# Patient Record
Sex: MALE | Race: WHITE | NOT HISPANIC OR LATINO | Employment: FULL TIME | URBAN - NONMETROPOLITAN AREA
[De-identification: names, ages, dates, MRNs, and addresses within clinical notes are randomized per-mention and may not be internally consistent; named-entity substitution may affect disease eponyms.]

---

## 2017-01-01 ENCOUNTER — HOSPITAL ENCOUNTER (OUTPATIENT)
Dept: OTHER | Facility: HOSPITAL | Age: 57
Setting detail: RECURRING SERIES
Discharge: HOME | End: 2017-01-20
Attending: THORACIC SURGERY (CARDIOTHORACIC VASCULAR SURGERY) | Admitting: THORACIC SURGERY (CARDIOTHORACIC VASCULAR SURGERY)

## 2017-01-07 ENCOUNTER — TRANSCRIBE ORDERS (OUTPATIENT)
Dept: CARDIAC REHAB | Facility: HOSPITAL | Age: 57
End: 2017-01-07

## 2017-01-07 DIAGNOSIS — Z95.1 S/P CABG (CORONARY ARTERY BYPASS GRAFT): Primary | ICD-10-CM

## 2017-01-10 DIAGNOSIS — I25.10 CORONARY ARTERY DISEASE INVOLVING NATIVE CORONARY ARTERY WITHOUT ANGINA PECTORIS, UNSPECIFIED WHETHER NATIVE OR TRANSPLANTED HEART: ICD-10-CM

## 2017-01-10 DIAGNOSIS — Z09 FOLLOW UP: Primary | ICD-10-CM

## 2017-01-11 ENCOUNTER — OFFICE VISIT (OUTPATIENT)
Dept: CARDIAC SURGERY | Facility: CLINIC | Age: 57
End: 2017-01-11

## 2017-01-11 ENCOUNTER — HOSPITAL ENCOUNTER (OUTPATIENT)
Dept: OTHER | Facility: HOSPITAL | Age: 57
Discharge: HOME | End: 2017-01-11
Attending: THORACIC SURGERY (CARDIOTHORACIC VASCULAR SURGERY) | Admitting: THORACIC SURGERY (CARDIOTHORACIC VASCULAR SURGERY)

## 2017-01-11 VITALS
WEIGHT: 206.1 LBS | DIASTOLIC BLOOD PRESSURE: 71 MMHG | BODY MASS INDEX: 31.24 KG/M2 | OXYGEN SATURATION: 98 % | SYSTOLIC BLOOD PRESSURE: 116 MMHG | HEIGHT: 68 IN | HEART RATE: 97 BPM

## 2017-01-11 DIAGNOSIS — Z09 FOLLOW-UP SURGERY CARE: Primary | ICD-10-CM

## 2017-01-11 DIAGNOSIS — J18.9 PNEUMONIA OF LEFT LOWER LOBE DUE TO INFECTIOUS ORGANISM: ICD-10-CM

## 2017-01-11 DIAGNOSIS — R06.09 DYSPNEA ON EXERTION: ICD-10-CM

## 2017-01-11 PROCEDURE — 99024 POSTOP FOLLOW-UP VISIT: CPT | Performed by: NURSE PRACTITIONER

## 2017-01-11 RX ORDER — LANOLIN ALCOHOL/MO/W.PET/CERES
325 CREAM (GRAM) TOPICAL 2 TIMES DAILY
Qty: 60 TABLET | Refills: 0 | Status: SHIPPED | OUTPATIENT
Start: 2017-01-11 | End: 2017-02-10

## 2017-01-11 RX ORDER — LEVOFLOXACIN 750 MG/1
750 TABLET ORAL DAILY
Qty: 5 TABLET | Refills: 0 | Status: SHIPPED | OUTPATIENT
Start: 2017-01-11 | End: 2017-01-16

## 2017-01-11 NOTE — MR AVS SNAPSHOT
Paddy Brantley   1/11/2017 10:00 AM   Office Visit    Dept Phone:  [phone]   Encounter #:  [id number]    Provider:  GISSELLE George   Department:  [facility] CARDIOTHORACIC AND VASCULAR SURGERY                Your Full Care Plan              Today's Medication Changes          These changes are accurate as of: 1/11/17 11:05 AM.  If you have any questions, ask your nurse or doctor.               New Medication(s)Ordered:     ferrous sulfate 325 (65 FE) MG EC tablet   Take 1 tablet by mouth 2 (Two) Times a Day for 30 days.   Started by:  GISSELLE George       levoFLOXacin 750 MG tablet   Commonly known as:  LEVAQUIN   Take 1 tablet by mouth Daily for 5 days.   Started by:  GISSELLE George         Stop taking medication(s)listed here:     clopidogrel 75 MG tablet   Commonly known as:  PLAVIX   Stopped by:  GISSELLE George           metoprolol tartrate 25 MG tablet   Commonly known as:  LOPRESSOR   Stopped by:  GISSELLE George                Where to Get Your Medications      These medications were sent to [facility] [address] [place] [address] AT [facility] [address] [place] - [phone] [facility] [phone] [address] [address]     Phone:  [phone]     aspirin 81 MG tablet    ferrous sulfate 325 (65 FE) MG EC tablet    levoFLOXacin 750 MG tablet                  Your Updated Medication List          This list is accurate as of: 1/11/17 11:05 AM.  Always use your most recent med list.                ascorbic acid 500 MG tablet   Commonly known as:  VITAMIN C       aspirin 81 MG tablet   Take 1 tablet by mouth Daily.       CARDIZEM  MG 24 hr tablet   Generic drug:  diltiazem LA       ferrous sulfate 325 (65 FE) MG EC tablet   Take 1 tablet by mouth 2 (Two) Times a Day for 30 days.       HUMULIN N 100 UNIT/ML injection   Generic drug:  insulin NPH       HYDROcodone-acetaminophen  MG per tablet   Commonly known as:  NORCO   Take 1 tablet by mouth Every 4 (Four) Hours As Needed for moderate pain (4-6).       levoFLOXacin 750 MG tablet   Commonly known as:  LEVAQUIN   Take 1 tablet by mouth Daily for 5 days.       magnesium oxide 400 (241.3 MG) MG tablet tablet   Commonly known as:  MAGOX       raNITIdine 300 MG tablet   Commonly known as:  ZANTAC       senna-docusate 8.6-50 MG per tablet   Commonly known as:  PERICOLACE               We Performed the Following     CBC (No Diff)       You Were Diagnosed With        Codes Comments    Follow-up surgery care    -  Primary ICD-10-CM: Z09  ICD-9-CM: V67.00     Dyspnea on exertion     ICD-10-CM: R06.09  ICD-9-CM: 786.09     Pneumonia of left lower lobe due to infectious organism     ICD-10-CM: J18.9  ICD-9-CM: 483.8       Instructions    If symptoms not better in 3 days, notify Provider for addition of steroids.     Keep f/u with Stefanie next week with CXR.     CXR today improved aeration of lung.     WBC elevated 13     Patient Instructions History      Upcoming Appointments     Visit Type Date Time Department    OFFICE VISIT 1/11/2017 10:00 AM MG[facility] CT VAS SURGERY [facility] FOLLOW UP 1/12/2017  3:45 PM [facility] HEART CARE [facility]    FOLLOW UP 1/18/2017  1:20 PM MGW CT VAS SURGERY [facility]    PHASE II 1/23/2017  8:00 AM [facility] CARDIAC REHAB    PHASE II 1/25/2017  8:00 AM [facility] CARDIAC REHAB    PHASE II 1/27/2017  8:00 AM [facility] CARDIAC REHAB    PHASE II 1/30/2017  8:00 AM [facility] CARDIAC REHAB    PHASE II 2/1/2017  8:00 AM [facility] CARDIAC REHAB    PHASE II 2/3/2017  8:00 AM [facility] CARDIAC REHAB    PHASE II 2/6/2017  8:00 AM [facility] CARDIAC REHAB    PHASE II 2/8/2017  8:00 AM [facility] CARDIAC REHAB    PHASE II 2/10/2017  8:00 AM [facility] CARDIAC REHAB    PHASE II 2/13/2017  8:00 AM [facility] CARDIAC REHAB    PHASE II 2/15/2017  8:00 AM [facility] CARDIAC REHAB    PHASE II 2/17/2017  8:00 AM [facility] CARDIAC REHAB    PHASE II 2/20/2017  8:00 AM  [facility]    PHASE II 2017  8:00 AM [facility]    PHASE II 2017  8:00 AM [facility]    PHASE II 2017  8:00 AM [facility]    PHASE II 3/1/2017  8:00 AM [facility]    PHASE II 3/3/2017  8:00 AM [facility]    PHASE II 3/6/2017  8:00 AM [facility]    PHASE II 3/8/2017  8:00 AM [facility]    PHASE II 3/10/2017  8:00 AM [facility]    PHASE II 3/13/2017  8:00 AM [facility]    PHASE II 3/15/2017  8:00 AM [facility]    PHASE II 3/17/2017  8:00 AM [facility]    PHASE II 3/20/2017  8:00 AM [facility]    PHASE II 3/22/2017  8:00 AM [facility]    PHASE II 3/24/2017  8:00 AM [facility]    PHASE II 3/27/2017  8:00 AM [facility]    PHASE II 3/29/2017  8:00 AM [facility]    PHASE II 3/31/2017  8:00 AM [facility]    PHASE II 4/3/2017  8:00 AM [facility]    PHASE II 2017  8:00 AM [facility]    PHASE II 2017  8:00 AM [facility]    PHASE II 4/10/2017  8:00 AM [facility]    PHASE II 2017  8:00 AM [facility]    PHASE II 2017  8:00 AM [facility]      [facility] [employer] Signup     [facility] allows you to send messages to your doctor, view your test results, renew your prescriptions, schedule appointments, and more. To sign up, go to [employer] and click on the Sign Up Now link in the New User? box. Enter your [employer] Activation Code exactly as it appears below along with the last four digits of your Social Security Number and your Date of Birth () to complete the sign-up process. If you do not sign up before the expiration date, you must request a new code.    MyChart Activation Code: [email or web address]  Expires: 2017 11:00 AM    If you have questions, you can email [email or web address] or call [phone] to talk to our MyChart staff.  "Remember, MyChart is NOT to be used for urgent needs. For medical emergencies, dial 911.               Other Info from Your Visit           Your Appointments     Jan 12, 2017  3:45 PM CST   Hospital Follow Up with Gordo Resendez MD   [facility] CARDIOLOGY (--)    [address]  [facility] [address]   [phone]            Jan 18, 2017  1:20 PM CST   Follow Up with GISSELLE Obando   [facility] CARDIOTHORACIC AND VASCULAR SURGERY (--)    [address] [place] [address] [address]  [facility] [address] [place] [address] [phone]           Arrive 15 minutes prior to appointment.            Jan 23, 2017  8:00 AM CST   PHASE II with  MAD CARD REHAB G[facility]   [facility] CARDIAC REHAB ([address] [address] [phone]            Jan 25, 2017  8:00 AM CST   PHASE II with  MAD CARD REHAB [facility] CARDIAC REHAB ([address] [address] [phone]            Jan 27, 2017  8:00 AM CST   PHASE II with  MAD CARD REHAB [facility] CARDIAC REHAB ([address] [address] [phone]              Allergies     Contrast Dye      Dexamethasone      Diphenhydramine      Iodine      Livalo [Pitavastatin] Allergy Swelling    Penicillins      Plavix [Clopidogrel Bisulfate]      Proton Pump Inhibitors      Shellfish-derived Products      Tetracyclines & Related        Reason for Visit     Coronary Artery Disease post CABG      Vital Signs     Blood Pressure Pulse Height Weight Oxygen Saturation Body Mass Index    116/71 (BP Location: Left arm) 97 68\" (172.7 cm) 206 lb 1.6 oz (93.5 kg) 98% 31.34 kg/m2    Smoking Status                   Former Smoker           Problems and Diagnoses Noted     Breathlessness on exertion    Encounter for examination following surgery    Left lower lobe " pneumonia

## 2017-01-13 NOTE — PROGRESS NOTES
Subjective   Patient ID: Paddy Brantley is a 56 y.o. male is here today for follow-up SOA,WEAKNESS,CABG F/U.    History of Present Illness  The following portions of the patient's history were reviewed and updated as appropriate: allergies, current medications, past family history, past medical history, past social history, past surgical history and problem list.  PCP: Rian  Card: Dank  Surgeon: Alina    56yr old male known CAD with PCI 2012, DM, DLP, tobacco use. Exertional CP with urgent cardiac cath demonstrating recurrent disease. He underwent CABG with unremarkable recovery and dc home on POD 3. He returned to hospital with SOA, Lg Left Hemothorax was drained with CT placement approx 4L total. Remained stable, mild anemia. Plavix was stopped. Request to be seen today for increasing SOA and generalized malaise.     11/2016: Carotid duplex: BICA <50%  11/2016: Echo: EF 55%  11/22/16: CABG x4 LIMA-LAD, SVG-OM, SVG-DX, SVG-PDA, EVH  1/3/17: CXR: LARGE LEFT HEMOTHORAX, CT Guided thoracentesis 500cc  1/4/16: CT placed 2.5L sanginous fluid drained  1/8/17: Cytology Negative Fluid  1/9/17: CXR: Sm Left effusion. DC'd home  1/11/17: CXR: Improved effusion.     Current Outpatient Prescriptions:   •  ascorbic acid (VITAMIN C) 500 MG tablet, Take 500 mg by mouth Daily., Disp: , Rfl:   •  aspirin 81 MG tablet, Take 1 tablet by mouth Daily., Disp: 30 tablet, Rfl: 11  •  diltiazem LA (CARDIZEM LA) 180 MG 24 hr tablet, Take 180 mg by mouth Every Night., Disp: , Rfl:   •  HYDROcodone-acetaminophen (NORCO)  MG per tablet, Take 1 tablet by mouth Every 4 (Four) Hours As Needed for moderate pain (4-6)., Disp: 40 tablet, Rfl: 0  •  insulin NPH (HUMULIN N) 100 UNIT/ML injection, Inject 42 Units under the skin 2 (Two) Times a Day., Disp: , Rfl:   •  magnesium oxide (MAGOX) 400 (241.3 MG) MG tablet tablet, Take 400 mg by mouth 2 (Two) Times a Day., Disp: , Rfl:   •  raNITIdine (ZANTAC) 300 MG tablet, Take 300 mg by mouth 2  (Two) Times a Day., Disp: , Rfl:   •  senna-docusate (PERICOLACE) 8.6-50 MG per tablet, Take 1 tablet by mouth 2 (Two) Times a Day., Disp: , Rfl:   •  ferrous sulfate 325 (65 FE) MG EC tablet, Take 1 tablet by mouth 2 (Two) Times a Day for 30 days., Disp: 60 tablet, Rfl: 0  •  levoFLOXacin (LEVAQUIN) 750 MG tablet, Take 1 tablet by mouth Daily for 5 days., Disp: 5 tablet, Rfl: 0    Review of Systems   Constitution: Positive for fever and weakness.   Cardiovascular: Positive for dyspnea on exertion. Negative for leg swelling.   Respiratory: Positive for shortness of breath. Negative for cough, sputum production and wheezing.    Skin: Negative for poor wound healing.   Musculoskeletal: Negative for falls.   Gastrointestinal: Negative for abdominal pain and constipation.   Genitourinary: Negative for dysuria.   Neurological: Negative for light-headedness.   All other systems reviewed and are negative.       Objective   Physical Exam   Constitutional: He is oriented to person, place, and time. He appears well-developed.   HENT:   Head: Normocephalic.   Cardiovascular: Normal rate.    Pulmonary/Chest: Accessory muscle usage present. He has decreased breath sounds in the left lower field.   Abdominal: Soft. Bowel sounds are normal. He exhibits no distension.   Musculoskeletal: Normal range of motion. He exhibits no edema.   Neurological: He is alert and oriented to person, place, and time.   Skin: Skin is warm and dry. No erythema.   CT sites clear   Vitals reviewed.    Hospital Outpatient Visit on 01/11/2017   Component Date Value Ref Range Status   • WBC 01/11/2017 13.1* 3.2 - 9.8 x1000/uL Final   • RBC 01/11/2017 3.92* 4.37 - 5.74 ki/mm3 Final   • Hemoglobin 01/11/2017 11.0* 13.7 - 17.3 gm/dl Final   • Hematocrit 01/11/2017 34.0* 39.0 - 49.0 % Final   • MCV 01/11/2017 86.7  80.0 - 98.0 fl Final   • MCH 01/11/2017 28.1  26.0 - 34.0 pg Final   • MCHC 01/11/2017 32.4  31.5 - 36.3 gm/dl Final   • RDW 01/11/2017 13.9   11.5 - 14.5 % Final   • Platelets 01/11/2017 362  150 - 450 x1000/mm3 Final   • MPV 01/11/2017 8.7  8.0 - 12.0 fl Final   • Neutrophil Rel % 01/11/2017 83.0* 37.0 - 80.0 % Final   • Lymphocyte Rel % 01/11/2017 8.0* 10.0 - 50.0 % Final   • Monocyte Rel % 01/11/2017 7.2  0.0 - 12.0 % Final   • Eosinophil Rel % 01/11/2017 0.8  0.0 - 7.0 % Final   • Basophil Rel % 01/11/2017 0.2  0.0 - 2.0 % Final   • Immature Granulocyte Rel % 01/11/2017 0.80* 0.00 - 0.50 % Final   • Neutrophils Absolute 01/11/2017 10.87* 2.00 - 8.60 x1000/uL Final   • Lymphocytes Absolute 01/11/2017 1.05  0.60 - 4.20 x1000/uL Final   • Monocytes Absolute 01/11/2017 0.94* 0.00 - 0.90 x1000/uL Final   • Eosinophils Absolute 01/11/2017 0.11  0.00 - 0.70 x1000/uL Final   • Basophils Absolute 01/11/2017 0.02  0.00 - 0.20 x1000/uL Final   • Immature Granulocytes Absolute 01/11/2017 0.100* 0.005 - 0.022 x1000/uL Final       Assessment/Plan   Independent Review of Radiographic Studies:    Detailed discussion regarding risks, benefits, and treatment plan.  Patient understands, agrees, and wishes to proceed with plan.  Progressing slowly.    1. Follow-up surgery care  Overall generalized weakness. Mild fever. Sites clear. H/H improved.   - CBC (No Diff)  Findings discussed with Dr. Fuller, treat accordingly.   Return next week with CXR - Stefanie PITTS    2. Dyspnea on exertion  CXR improved.     3. Pneumonia of left lower lobe due to infectious organism  Levaquin 750mg daily x 5 doses. CXR non-impressive however symptoms and WBC elevation suspicious for PNA.

## 2017-01-18 ENCOUNTER — HOSPITAL ENCOUNTER (OUTPATIENT)
Dept: OTHER | Facility: HOSPITAL | Age: 57
Discharge: HOME | End: 2017-01-18
Attending: THORACIC SURGERY (CARDIOTHORACIC VASCULAR SURGERY) | Admitting: THORACIC SURGERY (CARDIOTHORACIC VASCULAR SURGERY)

## 2017-01-18 ENCOUNTER — OFFICE VISIT (OUTPATIENT)
Dept: CARDIAC SURGERY | Facility: CLINIC | Age: 57
End: 2017-01-18

## 2017-01-18 VITALS
TEMPERATURE: 96.9 F | BODY MASS INDEX: 31.66 KG/M2 | DIASTOLIC BLOOD PRESSURE: 78 MMHG | OXYGEN SATURATION: 99 % | HEART RATE: 86 BPM | SYSTOLIC BLOOD PRESSURE: 131 MMHG | HEIGHT: 68 IN | WEIGHT: 208.9 LBS

## 2017-01-18 DIAGNOSIS — J90 PLEURAL EFFUSION: Primary | ICD-10-CM

## 2017-01-18 DIAGNOSIS — Z09 FOLLOW-UP SURGERY CARE: ICD-10-CM

## 2017-01-18 DIAGNOSIS — J90 PLEURAL EFFUSION: ICD-10-CM

## 2017-01-18 DIAGNOSIS — I25.110 CORONARY ARTERY DISEASE INVOLVING NATIVE CORONARY ARTERY OF NATIVE HEART WITH UNSTABLE ANGINA PECTORIS (HCC): Primary | ICD-10-CM

## 2017-01-18 PROCEDURE — 99024 POSTOP FOLLOW-UP VISIT: CPT | Performed by: NURSE PRACTITIONER

## 2017-01-18 NOTE — MR AVS SNAPSHOT
Paddy Brantley   1/18/2017 1:20 PM   Office Visit    Dept Phone:  [phone]   Encounter #:  [id number]    Provider:  GISSELLE Obando   Department:  [facility] CARDIOTHORACIC AND VASCULAR SURGERY                Your Full Care Plan              Your Updated Medication List          This list is accurate as of: 1/18/17  2:24 PM.  Always use your most recent med list.                ascorbic acid 500 MG tablet   Commonly known as:  VITAMIN C       aspirin 81 MG tablet   Take 1 tablet by mouth Daily.       CARDIZEM  MG 24 hr tablet   Generic drug:  diltiazem LA       ferrous sulfate 325 (65 FE) MG EC tablet   Take 1 tablet by mouth 2 (Two) Times a Day for 30 days.       HUMULIN N 100 UNIT/ML injection   Generic drug:  insulin NPH       HYDROcodone-acetaminophen  MG per tablet   Commonly known as:  NORCO   Take 1 tablet by mouth Every 4 (Four) Hours As Needed for moderate pain (4-6).       magnesium oxide 400 (241.3 MG) MG tablet tablet   Commonly known as:  MAGOX       raNITIdine 300 MG tablet   Commonly known as:  ZANTAC       senna-docusate 8.6-50 MG per tablet   Commonly known as:  PERICOLACE               You Were Diagnosed With        Codes Comments    Coronary artery disease involving native coronary artery of native heart with unstable angina pectoris    -  Primary ICD-10-CM: I25.110  ICD-9-CM: 414.01, 411.1     Follow-up surgery care     ICD-10-CM: Z09  ICD-9-CM: V67.00     Pleural effusion     ICD-10-CM: J90  ICD-9-CM: 511.9       Instructions    Ful 1 wk with chest xray  Its stable  Accapella every 2 hrs      Patient Instructions History      Upcoming Appointments     Visit Type Date Time Department    FOLLOW UP 1/18/2017  1:20 PM MGW CT VAS SURGERY [facility]    PHASE II 1/23/2017  8:00 AM [facility] CARDIAC REHAB    PHASE II 1/25/2017  8:00 AM [facility] CARDIAC REHAB    PHASE II 1/27/2017  8:00 AM [facility] CARDIAC REHAB    PHASE II 1/30/2017  8:00 AM [facility]  CARDIAC REHAB    PHASE II 2/1/2017  8:00 AM [facility] CARDIAC REHAB    OFFICE VISIT 2/1/2017 10:15 AM [facility] HEART CARE [facility]    PHASE II 2/3/2017  8:00 AM [facility]IAC REH[facility]    PHASE II 2/6/2017  8:00 AM [facility]    PHASE II 2/8/2017  8:00 AM [facility]    PHASE II 2/10/2017  8:00 AM [facility]    PHASE II 2/13/2017  8:00 AM [facility]    PHASE II 2/15/2017  8:00 AM [facility]    PHASE II 2/17/2017  8:00 AM [facility]    PHASE II 2/20/2017  8:00 AM [facility]    PHASE II 2/22/2017  8:00 AM [facility]    PHASE II 2/24/2017  8:00 AM [facility]    PHASE II 2/27/2017  8:00 AM [facility]    PHASE II 3/1/2017  8:00 AM [facility]    PHASE II 3/3/2017  8:00 AM [facility]    PHASE II 3/6/2017  8:00 AM [facility]    PHASE II 3/8/2017  8:00 AM [facility]    PHASE II 3/10/2017  8:00 AM [facility] REH[facility]    PHASE II 3/13/2017  8:00 AM [facility] REH[facility]    PHASE II 3/15/2017  8:00 AM [facility]    PHASE II 3/17/2017  8:00 AM [facility]    PHASE II 3/20/2017  8:00 AM [facility]    PHASE II 3/22/2017  8:00 AM [facility] REH[facility]    PHASE II 3/24/2017  8:00 AM [facility] REH[facility]    PHASE II 3/27/2017  8:00 AM [facility] REH[facility]    PHASE II 3/29/2017  8:00 AM [facility] REH[facility]    PHASE II 3/31/2017  8:00 AM [facility] REH[facility]    PHASE II 4/3/2017  8:00 AM [facility]    PHASE II 4/5/2017  8:00 AM [facility]ARDI[facility] REHAB    PHASE II 4/7/2017  8:00 AM [facility] REH[facility]    PHASE II 4/10/2017  8:00 AM [facility] CARDIAC REHAB    PHASE II 4/12/2017  8:00 AM [facility] CARDIAC REHAB    PHASE II 4/14/2017  8:00 AM [facility] CARDIAC REHAB      [employer] [employer] Signup     [facility] allows you to send messages to your doctor, view your test results, renew your prescriptions, schedule appointments, and more. To sign up, go to [employer] [facility] and click  on the Sign Up Now link in the New User? box. Enter your [employer] Activation Code exactly as it appears below along with the last four digits of your Social Security Number and your Date of Birth () to complete the sign-up process. If you do not sign up before the expiration date, you must request a new code.    [employer] Activation Code: [email or web address]  Expires: 2017 11:00 AM    If you have questions, you can email [email or web address] or call [phone] to talk to our [place] staff. Remember, [employer] is NOT to be used for urgent needs. For medical emergencies, dial 911.               Other Info from Your Visit           Your Appointments     2017  8:00 AM CST   PHASE II with  MAD CARD REHAB [facility] CARDIAC REHAB [address] [address]   [phone]            2017  8:00 AM CST   PHASE II with  MAD CARD REHAB [facility] CARDIAC REHAB ([address] [address]   [phone]            2017  8:00 AM CST   PHASE II with  MAD CARD REHAB [facility] CARDIAC REHAB [address] [address]   [phone]            2017  8:00 AM CST   PHASE II with  MAD CARD REHAB [facility] CARDIAC REHAB [address] [address]   [phone]            2017  8:00 AM CST   PHASE II with  MAD CARD REHAB [facility] CARDIAC REHAB [address] [address]   [phone]              Allergies     Contrast Dye      Dexamethasone      Diphenhydramine      Iodine      Livalo [Pitavastatin] Allergy Swelling    Penicillins      Plavix [Clopidogrel Bisulfate]      Proton Pump Inhibitors      Shellfish-derived Products      Tetracyclines & Related        Reason for  "Visit     F/U Plueral Effusion           Vital Signs     Blood Pressure Pulse Temperature Height Weight Oxygen Saturation    131/78 (BP Location: Left arm) 86 96.9 °F (36.1 °C) 68\" (172.7 cm) 208 lb 14.4 oz (94.8 kg) 99%    Body Mass Index Smoking Status                31.76 kg/m2 Former Smoker          Problems and Diagnoses Noted     Coronary artery disease involving native coronary artery of native heart with unstable angina pectoris    Encounter for examination following surgery    Fluid in pleural cavity            "

## 2017-01-19 NOTE — PROGRESS NOTES
Subjective   Patient ID: Paddy Brantley is a 56 y.o. male is here today for follow-up for LEFT pleural effusion (hemothorax) SP CABG and Plavix therapy.  CC:  VAS 2 chest incision  No COYNE or SOA  Feeling better  Not smoking   History of Present Illness  PCP: Toms  Card: Dank  Surgeon: Alina    Mr Brantley is gentleman  known CAD with PCI 2012, DM, DLP, tobacco use. Exertional CP with urgent cardiac cath demonstrating recurrent disease. He underwent CABG with unremarkable recovery and dc home on POD 3. He returned to hospital with SOA, Lg Left Hemothorax was drained with CT placement approx 4L total. Remained stable, mild anemia. Plavix was stopped. Returns today in schedule FU for evaluation of LEFT pleural effusion.     11/2016: Carotid duplex: BICA <50%  11/2016: Echo: EF 55%  11/22/16: CABG x4 LIMA-LAD, SVG-OM, SVG-DX, SVG-PDA, EVH  1/3/17: CXR: LARGE LEFT HEMOTHORAX, CT Guided thoracentesis 500cc  1/4/16: CT placed 2.5L sanginous fluid drained  1/8/17: Cytology Negative Fluid  1/9/17: CXR: Sm Left effusion. DC'd home  1/11/17: CXR: Improved effusion.   1/18/17   CXR: Improved effusion.  Stable to previous CXR.  Linear atelectasis    The following portions of the patient's history were reviewed and updated as appropriate: allergies, current medications, past family history, past medical history, past social history, past surgical history and problem list.    Current Outpatient Prescriptions:   •  ascorbic acid (VITAMIN C) 500 MG tablet, Take 500 mg by mouth Daily., Disp: , Rfl:   •  aspirin 81 MG tablet, Take 1 tablet by mouth Daily., Disp: 30 tablet, Rfl: 11  •  diltiazem LA (CARDIZEM LA) 180 MG 24 hr tablet, Take 180 mg by mouth Every Night., Disp: , Rfl:   •  ferrous sulfate 325 (65 FE) MG EC tablet, Take 1 tablet by mouth 2 (Two) Times a Day for 30 days., Disp: 60 tablet, Rfl: 0  •  HYDROcodone-acetaminophen (NORCO)  MG per tablet, Take 1 tablet by mouth Every 4 (Four) Hours As Needed for moderate  pain (4-6)., Disp: 40 tablet, Rfl: 0  •  insulin NPH (HUMULIN N) 100 UNIT/ML injection, Inject 42 Units under the skin 2 (Two) Times a Day., Disp: , Rfl:   •  magnesium oxide (MAGOX) 400 (241.3 MG) MG tablet tablet, Take 400 mg by mouth 2 (Two) Times a Day., Disp: , Rfl:   •  raNITIdine (ZANTAC) 300 MG tablet, Take 300 mg by mouth 2 (Two) Times a Day., Disp: , Rfl:   •  senna-docusate (PERICOLACE) 8.6-50 MG per tablet, Take 1 tablet by mouth 2 (Two) Times a Day., Disp: , Rfl:     Review of Systems   Constitution: Negative for chills, decreased appetite, fever, weakness and malaise/fatigue.   HENT: Negative for hoarse voice and nosebleeds.    Eyes: Positive for visual disturbance.   Cardiovascular: Positive for chest pain (VAS sternal discomfort  Desire no further pain pills  No pain pills in 4 days ). Negative for claudication, cyanosis, dyspnea on exertion, leg swelling and near-syncope.   Respiratory: Negative for cough, hemoptysis and shortness of breath.    Hematologic/Lymphatic: Does not bruise/bleed easily.   Skin: Negative for color change and poor wound healing.   Musculoskeletal: Negative for falls, muscle cramps and muscle weakness.   Gastrointestinal: Negative for change in bowel habit, heartburn, hematemesis and melena.   Genitourinary: Negative for hematuria.   Neurological: Negative for difficulty with concentration, disturbances in coordination, dizziness, numbness and paresthesias.   Psychiatric/Behavioral: Negative for altered mental status.        Objective   Physical Exam   Constitutional: He is oriented to person, place, and time. He appears well-nourished.   HENT:   Head: Normocephalic.   Eyes: Conjunctivae are normal. Pupils are equal, round, and reactive to light.   Neck: No JVD present. No tracheal deviation present.   Cardiovascular: Normal rate, regular rhythm and normal heart sounds.    Pulmonary/Chest: Effort normal and breath sounds normal.   Slightly decrease left base     Musculoskeletal: He exhibits no edema or tenderness.   Neurological: He is alert and oriented to person, place, and time.   Skin: Skin is warm and dry. No erythema. No pallor.   Sternal incision clean dry well healed Sternum stable     Nursing note and vitals reviewed.      Assessment/Plan   Independent Review of Radiographic Studies:    1/18/17   CXR: Improved effusion.  Stable to previous CXR.  Linear atelectasis  1. Coronary artery disease involving native coronary artery of native heart with unstable angina pectoris  Paddy Brantley is to continue  Antiplatelet.  Pt is intolerant of beta blockers, allergic to all statins.        2. Follow-up surgery care  Wounds healed  Restart cardiac rehab    3. Pleural effusion  Stable  Recheck 1 wk  Accapella every 2 hrs   - XR Chest 2 View; Future

## 2017-01-23 ENCOUNTER — HOSPITAL ENCOUNTER (OUTPATIENT)
Dept: CARDIAC REHAB | Facility: HOSPITAL | Age: 57
Setting detail: THERAPIES SERIES
Discharge: HOME | End: 2017-01-23

## 2017-01-23 VITALS
BODY MASS INDEX: 31.32 KG/M2 | SYSTOLIC BLOOD PRESSURE: 141 MMHG | HEART RATE: 112 BPM | WEIGHT: 206 LBS | DIASTOLIC BLOOD PRESSURE: 77 MMHG

## 2017-01-23 DIAGNOSIS — Z95.1 STATUS POST CORONARY ARTERY BYPASS GRAFT: Primary | ICD-10-CM

## 2017-01-23 PROCEDURE — 93798 PHYS/QHP OP CAR RHAB W/ECG: CPT

## 2017-01-25 ENCOUNTER — OFFICE VISIT (OUTPATIENT)
Dept: CARDIAC SURGERY | Facility: CLINIC | Age: 57
End: 2017-01-25

## 2017-01-25 ENCOUNTER — HOSPITAL ENCOUNTER (OUTPATIENT)
Dept: GENERAL RADIOLOGY | Facility: HOSPITAL | Age: 57
Discharge: HOME | End: 2017-01-25
Admitting: NURSE PRACTITIONER

## 2017-01-25 ENCOUNTER — HOSPITAL ENCOUNTER (OUTPATIENT)
Dept: GENERAL RADIOLOGY | Facility: HOSPITAL | Age: 57
End: 2017-01-25

## 2017-01-25 VITALS
SYSTOLIC BLOOD PRESSURE: 121 MMHG | WEIGHT: 206 LBS | HEIGHT: 68 IN | DIASTOLIC BLOOD PRESSURE: 68 MMHG | TEMPERATURE: 99.5 F | OXYGEN SATURATION: 98 % | BODY MASS INDEX: 31.22 KG/M2 | HEART RATE: 95 BPM

## 2017-01-25 DIAGNOSIS — I25.110 CORONARY ARTERY DISEASE INVOLVING NATIVE CORONARY ARTERY OF NATIVE HEART WITH UNSTABLE ANGINA PECTORIS (HCC): ICD-10-CM

## 2017-01-25 DIAGNOSIS — Z09 FOLLOW-UP SURGERY CARE: Primary | ICD-10-CM

## 2017-01-25 DIAGNOSIS — R06.09 DYSPNEA ON EXERTION: ICD-10-CM

## 2017-01-25 DIAGNOSIS — J90 PLEURAL EFFUSION: ICD-10-CM

## 2017-01-25 PROCEDURE — 71020 HC CHEST PA AND LATERAL: CPT

## 2017-01-25 PROCEDURE — 99024 POSTOP FOLLOW-UP VISIT: CPT | Performed by: NURSE PRACTITIONER

## 2017-01-25 RX ORDER — HYDROCHLOROTHIAZIDE 12.5 MG/1
25 TABLET ORAL DAILY
COMMUNITY
End: 2017-08-10

## 2017-01-25 RX ORDER — TRAMADOL HYDROCHLORIDE 50 MG/1
50 TABLET ORAL EVERY 6 HOURS PRN
Qty: 50 TABLET | Refills: 0 | Status: SHIPPED | OUTPATIENT
Start: 2017-01-25 | End: 2017-08-10

## 2017-01-25 NOTE — MR AVS SNAPSHOT
Paddy Brantley   1/25/2017 9:40 AM   Office Visit    Dept Phone:  [phone]   Encounter #:  [id number]    Provider:  GISSELLE George   Department:  [facility] CARDIOTHORACIC AND VASCULAR SURGERY                Your Full Care Plan              Today's Medication Changes          These changes are accurate as of: 1/25/17 11:29 AM.  If you have any questions, ask your nurse or doctor.               New Medication(s)Ordered:     traMADol 50 MG tablet   Commonly known as:  ULTRAM   Take 1 tablet by mouth Every 6 (Six) Hours As Needed for moderate pain (4-6).   Started by:  GISSELLE George            Where to Get Your Medications      You can get these medications from any pharmacy     Bring a paper prescription for each of these medications     traMADol 50 MG tablet                  Your Updated Medication List          This list is accurate as of: 1/25/17 11:29 AM.  Always use your most recent med list.                ascorbic acid 500 MG tablet   Commonly known as:  VITAMIN C       aspirin 81 MG tablet   Take 1 tablet by mouth Daily.       CARDIZEM  MG 24 hr tablet   Generic drug:  diltiazem LA       ferrous sulfate 325 (65 FE) MG EC tablet   Take 1 tablet by mouth 2 (Two) Times a Day for 30 days.       HUMULIN N 100 UNIT/ML injection   Generic drug:  insulin NPH       HYDROcodone-acetaminophen  MG per tablet   Commonly known as:  NORCO   Take 1 tablet by mouth Every 4 (Four) Hours As Needed for moderate pain (4-6).       magnesium oxide 400 (241.3 MG) MG tablet tablet   Commonly known as:  MAGOX       raNITIdine 300 MG tablet   Commonly known as:  ZANTAC       senna-docusate 8.6-50 MG per tablet   Commonly known as:  PERICOLACE       traMADol 50 MG tablet   Commonly known as:  ULTRAM   Take 1 tablet by mouth Every 6 (Six) Hours As Needed for moderate pain (4-6).               You Were Diagnosed With        Codes Comments    Follow-up  surgery care    -  Primary ICD-10-CM: Z09  ICD-9-CM: V67.00     Dyspnea on exertion     ICD-10-CM: R06.09  ICD-9-CM: 786.09     Coronary artery disease involving native coronary artery of native heart with unstable angina pectoris     ICD-10-CM: I25.110  ICD-9-CM: 414.01, 411.1       Instructions    Return 4 weeks with CXR    Increase HCTZ 25mg daily     Patient Instructions History      Upcoming Appointments     Visit Type Date Time Department    PHASE II 1/25/2017  8:00 AM  [facility] CARDIAC REHAB    OFFICE VISIT 1/25/2017  9:40 AM [facility] CT VAS SURGERY [facility]AD    XR MAD CHEST 2 VW 1/25/2017 10:15 AM  MAD XRAY    PHASE II 1/27/2017  8:00 AM  [facility] CARDIAC REHAB    PHASE II 1/30/2017  8:00 AM  [facility] CARDIAC REHAB    PHASE II 2/1/2017  8:00 AM  [facility] CARDIAC REHAB    OFFICE VISIT 2/1/2017 10:15 AM [facility] HEART CARE [facility]    PHASE II 2/3/2017  8:00 AM  [facility] CARDIAC REHAB    PHASE II 2/6/2017  8:00 AM  [facility] CARDIAC REHAB    PHASE II 2/8/2017  8:00 AM  [facility] CARDIAC REHAB    PHASE II 2/10/2017  8:00 AM  [facility] CARDIAC REHAB    PHASE II 2/13/2017  8:00 AM  [facility] CARDIAC REHAB    PHASE II 2/15/2017  8:00 AM  [facility] CARDIAC REHAB    PHASE II 2/17/2017  8:00 AM  [facility] CARDIAC REHAB    PHASE II 2/20/2017  8:00 AM  [facility] CARDIAC REHAB    PHASE II 2/22/2017  8:00 AM  [facility] CARDIAC REHAB    PHASE II 2/24/2017  8:00 AM  [facility] CARDIAC REHAB    PHASE II 2/27/2017  8:00 AM  [facility] CARDIAC REHAB    OFFICE VISIT 2/28/2017  1:00 PM MG[facility] CT VAS SURGERY MAD    PHASE II 3/1/2017  8:00 AM  [facility] CARDIAC REHAB    PHASE II 3/3/2017  8:00 AM  [facility] CARDIAC REHAB    PHASE II 3/6/2017  8:00 AM  [facility] CARDIAC REHAB    PHASE II 3/8/2017  8:00 AM  [facility] CARDIAC REHAB    PHASE II 3/10/2017  8:00 AM  [facility] CARDIAC REHAB    PHASE II 3/13/2017  8:00 AM [facility] CARDIAC REHAB    PHASE II 3/15/2017  8:00 AM [facility] CARD[facility]    PHASE II 3/17/2017  8:00 AM [facility]    PHASE II 3/20/2017  8:00 AM [facility]ARD[facility]    PHASE II 3/22/2017  8:00 AM [facility]  CARDIAC REHAB    PHASE II 3/24/2017  8:00 AM [facility] CARDIAC REHAB    PHASE II 3/27/2017  8:00 AM [facility] CARDIAC REHAB    PHASE II 3/29/2017  8:00 AM BH [facility] CARDIAC REHAB    PHASE II 3/31/2017  8:00 AM [facility] CARDIAC REHAB    PHASE II 4/3/2017  8:00 AM BH [facility] CARDIAC REHAB    PHASE II 2017  8:00 AM BH [facility] CARDIAC REHAB    PHASE II 2017  8:00 AM BH [facility]AD CARDIAC REHAB    PHASE II 4/10/2017  8:00 AM BH [facility] CARDIAC REHAB    PHASE II 2017  8:00 AM BH [facility] CARDIAC REHAB    PHASE II 2017  8:00 AM BH MAD CARDIAC REHAB      iSOCOt Signup     [facility] Alloka allows you to send messages to your doctor, view your test results, renew your prescriptions, schedule appointments, and more. To sign up, go to [employer] and click on the Sign Up Now link in the New User? box. Enter your [employer] Activation Code exactly as it appears below along with the last four digits of your Social Security Number and your Date of Birth () to complete the sign-up process. If you do not sign up before the expiration date, you must request a new code.    Alloka Activation Code: [email or web address]  Expires: 2017 11:28 AM    If you have questions, you can email [email or web address] or call [phone] to talk to our [employer] staff. Remember, [employer]oka is NOT to be used for urgent needs. For medical emergencies, dial 911.               Other Info from Your Visit           Your Appointments     2017  8:00 AM CST   PHASE II with  MAD CARD REHAB GYM   [facility] CARDIAC REHAB [address] [address]   [phone]            2017  8:00 AM CST   PHASE II with  MAD CARD REHAB GYM   [facility] CARDIAC REHAB ([address] [address]   [phone]            2017  8:00 AM CST   PHASE II with  MAD CARD REHAB [facility] CARDIAC REHAB ([place])  "   [address] [facility] [address] [phone]            Feb 01, 2017 10:15 AM CST   Office Visit with Gordo Resendez MD   [facility] CARDIOLOGY (--)    [address]  [facility] [address] [phone]           Arrive 15 minutes prior to appointment.            Feb 03, 2017  8:00 AM CST   PHASE II with BH MAD CARD REHAB GYM   [facility] CARDIAC REHAB ([place])    [address] [place] [address] [phone]              Allergies     Contrast Dye      Dexamethasone      Diphenhydramine      Iodine      Livalo [Pitavastatin] Allergy Swelling    Penicillins      Plavix [Clopidogrel Bisulfate]      Proton Pump Inhibitors      Shellfish-derived Products      Tetracyclines & Related        Reason for Visit     Coronary Artery Disease 1 week follow up       Vital Signs     Blood Pressure Pulse Temperature Height Weight Oxygen Saturation    121/68 (BP Location: Left arm) 95 99.5 °F (37.5 °C) (Oral) 68\" (172.7 cm) 206 lb (93.4 kg) 98%    Body Mass Index Smoking Status                31.32 kg/m2 Former Smoker          Problems and Diagnoses Noted     Coronary artery disease involving native coronary artery of native heart with unstable angina pectoris    Breathlessness on exertion    Encounter for examination following surgery        "

## 2017-01-25 NOTE — LETTER
January 25, 2017     Patient: Paddy Brantley   YOB: 1960   Date of Visit: 1/25/2017       To Whom It May Concern:    It is my medical opinion that Paddy Brantley may return to work 2/6/17 with continued lifting restrictions <20lbs until 2/22/17.           Sincerely,        GSISELLE George    CC: No Recipients

## 2017-01-25 NOTE — PROGRESS NOTES
Subjective   Patient ID: Paddy Brantley is a 56 y.o. male is here today for follow-up CABG, PLEURAL EFFUSION.    History of Present Illness  The following portions of the patient's history were reviewed and updated as appropriate: allergies, current medications, past family history, past medical history, past social history, past surgical history and problem list.  PCP: Rian  Card: Dank  Surgeon: Alina    Mr Brantley is gentleman  known CAD with PCI 2012, DM, DLP, tobacco use. Exertional CP with urgent cardiac cath demonstrating recurrent disease. He underwent CABG with unremarkable recovery and dc home on POD 3. He returned to hospital with SOA, Lg Left Hemothorax was drained with CT placement approx 4L total. Remained stable, mild anemia. Plavix was stopped. Returns today in schedule FU for evaluation of LEFT pleural effusion. Doesn't feel well today.   VAS: 6-Chest Incision     11/2016: Carotid duplex: BICA <50%  11/2016: Echo: EF 55%  11/22/16: CABG x4 LIMA-LAD, SVG-OM, SVG-DX, SVG-PDA, EVH  1/3/17: CXR: LARGE LEFT HEMOTHORAX, CT Guided thoracentesis 500cc  1/4/16: CT placed 2.5L sanginous fluid drained  1/8/17: Cytology Negative Fluid  1/9/17: CXR: Sm Left effusion. DC'd home  1/11/17: CXR: Improved effusion.   1/18/17   CXR: Improved effusion.  Stable to previous CXR.  Linear atelectasis  1/25/17: CXR: Stable Sm-Mod Left effusion.       Current Outpatient Prescriptions:   •  ascorbic acid (VITAMIN C) 500 MG tablet, Take 500 mg by mouth Daily., Disp: , Rfl:   •  aspirin 81 MG tablet, Take 1 tablet by mouth Daily., Disp: 30 tablet, Rfl: 11  •  diltiazem LA (CARDIZEM LA) 180 MG 24 hr tablet, Take 180 mg by mouth Every Night., Disp: , Rfl:   •  ferrous sulfate 325 (65 FE) MG EC tablet, Take 1 tablet by mouth 2 (Two) Times a Day for 30 days., Disp: 60 tablet, Rfl: 0  •  hydrochlorothiazide (HYDRODIURIL) 12.5 MG tablet, Take 25 mg by mouth Daily., Disp: , Rfl:   •  HYDROcodone-acetaminophen (NORCO)  MG per  tablet, Take 1 tablet by mouth Every 4 (Four) Hours As Needed for moderate pain (4-6)., Disp: 40 tablet, Rfl: 0  •  insulin NPH (HUMULIN N) 100 UNIT/ML injection, Inject 42 Units under the skin 2 (Two) Times a Day., Disp: , Rfl:   •  magnesium oxide (MAGOX) 400 (241.3 MG) MG tablet tablet, Take 400 mg by mouth 2 (Two) Times a Day., Disp: , Rfl:   •  raNITIdine (ZANTAC) 300 MG tablet, Take 300 mg by mouth 2 (Two) Times a Day., Disp: , Rfl:   •  senna-docusate (PERICOLACE) 8.6-50 MG per tablet, Take 1 tablet by mouth 2 (Two) Times a Day., Disp: , Rfl:   •  traMADol (ULTRAM) 50 MG tablet, Take 1 tablet by mouth Every 6 (Six) Hours As Needed for moderate pain (4-6)., Disp: 50 tablet, Rfl: 0    Review of Systems   Constitution: Positive for fever (99.5).   Cardiovascular: Positive for chest pain (surgical) and dyspnea on exertion. Negative for palpitations.   Respiratory: Positive for cough (minimal sputum production).    Skin: Negative for poor wound healing.   Genitourinary: Negative for dysuria.   Neurological: Negative for light-headedness.   All other systems reviewed and are negative.       Objective   Physical Exam   Constitutional: He is oriented to person, place, and time. He appears well-developed.   HENT:   Head: Normocephalic.   Cardiovascular: Normal rate.    Pulmonary/Chest: Effort normal. He has decreased breath sounds in the left lower field.   Abdominal: Soft.   Musculoskeletal: Normal range of motion. He exhibits no edema.   Neurological: He is alert and oriented to person, place, and time.   Skin: Skin is warm and dry. No erythema.   Vitals reviewed.      Assessment/Plan   Independent Review of Radiographic Studies:    Detailed discussion regarding risks, benefits, and treatment plan.  Patient understands, agrees, and wishes to proceed with plan.  Progressing slowly.    1. Follow-up surgery care  Progressing with Cardiac Rehab recently started back following hospitalization.  Requests pain medication  refill, original Rx from PCP for chronic back pain. Discussed weaning protocol post surgery. Rx given for Tramadol. Further Nebo will need to be written from PCP.      2. Dyspnea on exertion  Stable Sm-Mod Pleural effusion. Increase HCTZ hesitant to use Lasix  - XR Chest 2 View; Future (4weeks)    3. Coronary artery disease involving native coronary artery of native heart with unstable angina pectoris  Continue Med Mgmt

## 2017-01-27 ENCOUNTER — HOSPITAL ENCOUNTER (OUTPATIENT)
Dept: CARDIAC REHAB | Facility: HOSPITAL | Age: 57
Setting detail: THERAPIES SERIES
Discharge: HOME | End: 2017-01-27

## 2017-01-27 VITALS — HEART RATE: 100 BPM | SYSTOLIC BLOOD PRESSURE: 135 MMHG | DIASTOLIC BLOOD PRESSURE: 71 MMHG

## 2017-01-27 DIAGNOSIS — Z95.1 S/P CABG (CORONARY ARTERY BYPASS GRAFT): Primary | ICD-10-CM

## 2017-01-27 PROCEDURE — 93798 PHYS/QHP OP CAR RHAB W/ECG: CPT

## 2017-01-30 ENCOUNTER — HOSPITAL ENCOUNTER (OUTPATIENT)
Dept: CARDIAC REHAB | Facility: HOSPITAL | Age: 57
Setting detail: THERAPIES SERIES
Discharge: HOME | End: 2017-01-30

## 2017-01-30 VITALS
WEIGHT: 208 LBS | DIASTOLIC BLOOD PRESSURE: 71 MMHG | SYSTOLIC BLOOD PRESSURE: 140 MMHG | BODY MASS INDEX: 31.63 KG/M2 | HEART RATE: 105 BPM

## 2017-01-30 DIAGNOSIS — Z95.1 S/P CABG (CORONARY ARTERY BYPASS GRAFT): Primary | ICD-10-CM

## 2017-01-30 PROCEDURE — 93798 PHYS/QHP OP CAR RHAB W/ECG: CPT

## 2017-02-01 ENCOUNTER — HOSPITAL ENCOUNTER (OUTPATIENT)
Dept: CARDIAC REHAB | Facility: HOSPITAL | Age: 57
Setting detail: THERAPIES SERIES
Discharge: HOME | End: 2017-02-01

## 2017-02-01 VITALS — HEART RATE: 96 BPM | SYSTOLIC BLOOD PRESSURE: 132 MMHG | DIASTOLIC BLOOD PRESSURE: 74 MMHG

## 2017-02-01 DIAGNOSIS — Z95.1 S/P CABG (CORONARY ARTERY BYPASS GRAFT): Primary | ICD-10-CM

## 2017-02-01 PROCEDURE — 93798 PHYS/QHP OP CAR RHAB W/ECG: CPT

## 2017-02-03 ENCOUNTER — HOSPITAL ENCOUNTER (OUTPATIENT)
Dept: CARDIAC REHAB | Facility: HOSPITAL | Age: 57
Setting detail: THERAPIES SERIES
Discharge: HOME | End: 2017-02-03

## 2017-02-03 VITALS — SYSTOLIC BLOOD PRESSURE: 161 MMHG | HEART RATE: 136 BPM | DIASTOLIC BLOOD PRESSURE: 80 MMHG

## 2017-02-03 DIAGNOSIS — Z95.1 S/P CABG (CORONARY ARTERY BYPASS GRAFT): Primary | ICD-10-CM

## 2017-02-03 PROCEDURE — 93798 PHYS/QHP OP CAR RHAB W/ECG: CPT

## 2017-02-08 ENCOUNTER — OFFICE VISIT (OUTPATIENT)
Dept: CARDIOLOGY | Facility: CLINIC | Age: 57
End: 2017-02-08

## 2017-02-08 VITALS
SYSTOLIC BLOOD PRESSURE: 140 MMHG | HEART RATE: 80 BPM | DIASTOLIC BLOOD PRESSURE: 60 MMHG | WEIGHT: 210 LBS | BODY MASS INDEX: 31.93 KG/M2

## 2017-02-08 DIAGNOSIS — E78.5 DYSLIPIDEMIA: Primary | ICD-10-CM

## 2017-02-08 DIAGNOSIS — Z95.1 S/P CABG (CORONARY ARTERY BYPASS GRAFT): ICD-10-CM

## 2017-02-08 DIAGNOSIS — I10 ESSENTIAL HYPERTENSION: ICD-10-CM

## 2017-02-08 DIAGNOSIS — I25.110 CORONARY ARTERY DISEASE INVOLVING NATIVE CORONARY ARTERY OF NATIVE HEART WITH UNSTABLE ANGINA PECTORIS (HCC): ICD-10-CM

## 2017-02-08 PROBLEM — Z72.0 TOBACCO ABUSE: Status: ACTIVE | Noted: 2017-02-08

## 2017-02-08 PROCEDURE — 99213 OFFICE O/P EST LOW 20 MIN: CPT | Performed by: INTERNAL MEDICINE
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
## 2017-02-20 ENCOUNTER — HOSPITAL ENCOUNTER (OUTPATIENT)
Dept: CARDIAC REHAB | Facility: HOSPITAL | Age: 57
Setting detail: THERAPIES SERIES
Discharge: HOME | End: 2017-02-20

## 2017-02-20 VITALS
WEIGHT: 215 LBS | HEART RATE: 87 BPM | DIASTOLIC BLOOD PRESSURE: 68 MMHG | BODY MASS INDEX: 32.69 KG/M2 | SYSTOLIC BLOOD PRESSURE: 135 MMHG

## 2017-02-20 DIAGNOSIS — Z95.1 S/P CABG (CORONARY ARTERY BYPASS GRAFT): Primary | ICD-10-CM

## 2017-02-20 PROCEDURE — 93798 PHYS/QHP OP CAR RHAB W/ECG: CPT

## 2017-02-28 ENCOUNTER — OFFICE VISIT (OUTPATIENT)
Dept: CARDIAC SURGERY | Facility: CLINIC | Age: 57
End: 2017-02-28

## 2017-02-28 ENCOUNTER — HOSPITAL ENCOUNTER (OUTPATIENT)
Dept: GENERAL RADIOLOGY | Facility: HOSPITAL | Age: 57
Discharge: HOME | End: 2017-02-28
Admitting: NURSE PRACTITIONER

## 2017-02-28 VITALS
HEART RATE: 80 BPM | DIASTOLIC BLOOD PRESSURE: 22 MMHG | SYSTOLIC BLOOD PRESSURE: 127 MMHG | WEIGHT: 217.2 LBS | BODY MASS INDEX: 32.92 KG/M2 | HEIGHT: 68 IN | OXYGEN SATURATION: 98 % | TEMPERATURE: 98 F

## 2017-02-28 DIAGNOSIS — I65.23 CAROTID STENOSIS, ASYMPTOMATIC, BILATERAL: ICD-10-CM

## 2017-02-28 DIAGNOSIS — Z09 FOLLOW-UP SURGERY CARE: Primary | ICD-10-CM

## 2017-02-28 DIAGNOSIS — R06.09 DYSPNEA ON EXERTION: ICD-10-CM

## 2017-02-28 DIAGNOSIS — I25.110 CORONARY ARTERY DISEASE INVOLVING NATIVE CORONARY ARTERY OF NATIVE HEART WITH UNSTABLE ANGINA PECTORIS (HCC): ICD-10-CM

## 2017-02-28 PROCEDURE — 71020 HC CHEST PA AND LATERAL: CPT

## 2017-02-28 PROCEDURE — 99213 OFFICE O/P EST LOW 20 MIN: CPT | Performed by: NURSE PRACTITIONER

## 2017-03-01 NOTE — PROGRESS NOTES
Subjective   Patient ID: Paddy Brantley is a 56 y.o. male is here today for follow-up CABG, PLEURAL EFFUSION.    History of Present Illness  The following portions of the patient's history were reviewed and updated as appropriate: allergies, current medications, past family history, past medical history, past social history, past surgical history and problem list.  PCP: Rian  Card: Dank  Surgeon: Alina    Mr Brantley is gentleman  known CAD with PCI 2012, DM, DLP, tobacco use. Exertional CP with urgent cardiac cath demonstrating recurrent disease. He underwent CABG with unremarkable recovery and dc home on POD 3. He returned to hospital with SOA, Lg Left Hemothorax was drained with CT placement approx 4L total. Remained stable, mild anemia. Plavix was stopped. Returns today in schedule FU for evaluation of LEFT pleural effusion. Remains SOA with exertion.    11/2016: PFT: FEV1 31% Severe Restrictive lung disease.  11/2016: Carotid duplex: BICA <50%  11/2016: Echo: EF 55%  11/22/16: CABG x4 LIMA-LAD, SVG-OM, SVG-DX, SVG-PDA, EVH  1/3/17: CXR: LARGE LEFT HEMOTHORAX, CT Guided thoracentesis 500cc  1/4/16: CT placed 2.5L sanginous fluid drained  1/8/17: Cytology Negative Fluid  1/9/17: CXR: Sm Left effusion. DC'd home  1/11/17: CXR: Improved effusion.   1/18/17   CXR: Improved effusion.  Stable to previous CXR.  Linear atelectasis  1/25/17: CXR: Stable Sm-Mod Left effusion.   2/28/17: CXR: Improved Small Left effusion      Current Outpatient Prescriptions:   •  ascorbic acid (VITAMIN C) 500 MG tablet, Take 500 mg by mouth Daily., Disp: , Rfl:   •  aspirin 81 MG tablet, Take 1 tablet by mouth Daily., Disp: 30 tablet, Rfl: 11  •  diltiazem LA (CARDIZEM LA) 180 MG 24 hr tablet, Take 180 mg by mouth Every Night., Disp: , Rfl:   •  hydrochlorothiazide (HYDRODIURIL) 12.5 MG tablet, Take 25 mg by mouth Daily., Disp: , Rfl:   •  HYDROcodone-acetaminophen (NORCO)  MG per tablet, Take 1 tablet by mouth Every 4 (Four)  Hours As Needed for moderate pain (4-6)., Disp: 40 tablet, Rfl: 0  •  insulin NPH (HUMULIN N) 100 UNIT/ML injection, Inject 42 Units under the skin 2 (Two) Times a Day., Disp: , Rfl:   •  insulin regular (humuLIN R) 500 UNIT/ML CONCENTRATED injection, Inject  under the skin. Use as directed, Disp: , Rfl:   •  magnesium oxide (MAGOX) 400 (241.3 MG) MG tablet tablet, Take 400 mg by mouth 2 (Two) Times a Day., Disp: , Rfl:   •  raNITIdine (ZANTAC) 300 MG tablet, Take 300 mg by mouth 2 (Two) Times a Day., Disp: , Rfl:   •  senna-docusate (PERICOLACE) 8.6-50 MG per tablet, Take 1 tablet by mouth 2 (Two) Times a Day., Disp: , Rfl:   •  traMADol (ULTRAM) 50 MG tablet, Take 1 tablet by mouth Every 6 (Six) Hours As Needed for moderate pain (4-6)., Disp: 50 tablet, Rfl: 0    Review of Systems   Constitution: Negative for fever.   Cardiovascular: Positive for chest pain (surgical) and dyspnea on exertion. Negative for palpitations.   Respiratory: Negative for cough.    Skin: Negative for poor wound healing.   Genitourinary: Negative for dysuria.   Neurological: Negative for light-headedness.   All other systems reviewed and are negative.       Objective   Physical Exam   Constitutional: He is oriented to person, place, and time. He appears well-developed.   HENT:   Head: Normocephalic.   Cardiovascular: Normal rate.    Pulmonary/Chest: Effort normal. He has decreased breath sounds in the left lower field.   Abdominal: Soft.   Musculoskeletal: Normal range of motion. He exhibits no edema.   Neurological: He is alert and oriented to person, place, and time.   Skin: Skin is warm and dry. No erythema.   Vitals reviewed.      Assessment/Plan   Independent Review of Radiographic Studies:    Detailed discussion regarding risks, benefits, and treatment plan.  Patient understands, agrees, and wishes to proceed with plan.  Slow improving post op. Has returned to work.    1. Follow-up surgery care  Continue cardiac rehab on off work  days.   CVTS PRN    2. Coronary artery disease involving native coronary artery of native heart with unstable angina pectoris  Medical management  Follow with Dr. Wemes    3. Dyspnea on exertion  Improved CXR. Continue HCTZ, acapella, incentive.   Restrictive disease per PFT, inhaler PRN, pulmonary rehab.    4. Bilateral Carotid Stenosis  Annual surveillance recommended. Patient declined at this time.

## 2017-03-22 ENCOUNTER — APPOINTMENT (OUTPATIENT)
Dept: CARDIAC REHAB | Facility: HOSPITAL | Age: 57
End: 2017-03-22

## 2017-03-24 ENCOUNTER — APPOINTMENT (OUTPATIENT)
Dept: CARDIAC REHAB | Facility: HOSPITAL | Age: 57
End: 2017-03-24

## 2017-03-27 ENCOUNTER — APPOINTMENT (OUTPATIENT)
Dept: CARDIAC REHAB | Facility: HOSPITAL | Age: 57
End: 2017-03-27

## 2017-03-29 ENCOUNTER — APPOINTMENT (OUTPATIENT)
Dept: CARDIAC REHAB | Facility: HOSPITAL | Age: 57
End: 2017-03-29

## 2017-03-31 ENCOUNTER — APPOINTMENT (OUTPATIENT)
Dept: CARDIAC REHAB | Facility: HOSPITAL | Age: 57
End: 2017-03-31

## 2017-04-03 ENCOUNTER — APPOINTMENT (OUTPATIENT)
Dept: CARDIAC REHAB | Facility: HOSPITAL | Age: 57
End: 2017-04-03

## 2017-04-05 ENCOUNTER — APPOINTMENT (OUTPATIENT)
Dept: CARDIAC REHAB | Facility: HOSPITAL | Age: 57
End: 2017-04-05

## 2017-04-07 ENCOUNTER — APPOINTMENT (OUTPATIENT)
Dept: CARDIAC REHAB | Facility: HOSPITAL | Age: 57
End: 2017-04-07

## 2017-04-10 ENCOUNTER — APPOINTMENT (OUTPATIENT)
Dept: CARDIAC REHAB | Facility: HOSPITAL | Age: 57
End: 2017-04-10

## 2017-04-12 ENCOUNTER — APPOINTMENT (OUTPATIENT)
Dept: CARDIAC REHAB | Facility: HOSPITAL | Age: 57
End: 2017-04-12

## 2017-04-14 ENCOUNTER — APPOINTMENT (OUTPATIENT)
Dept: CARDIAC REHAB | Facility: HOSPITAL | Age: 57
End: 2017-04-14

## 2017-07-18 RX ORDER — DILTIAZEM HYDROCHLORIDE 180 MG/1
CAPSULE, EXTENDED RELEASE ORAL
Qty: 90 CAPSULE | Refills: 2 | Status: SHIPPED | OUTPATIENT
Start: 2017-07-18 | End: 2017-10-26 | Stop reason: SDUPTHER

## 2017-08-10 ENCOUNTER — OFFICE VISIT (OUTPATIENT)
Dept: CARDIOLOGY | Facility: CLINIC | Age: 57
End: 2017-08-10

## 2017-08-10 VITALS
HEIGHT: 68 IN | BODY MASS INDEX: 33.04 KG/M2 | DIASTOLIC BLOOD PRESSURE: 72 MMHG | WEIGHT: 218 LBS | HEART RATE: 75 BPM | SYSTOLIC BLOOD PRESSURE: 130 MMHG

## 2017-08-10 DIAGNOSIS — I10 ESSENTIAL HYPERTENSION: ICD-10-CM

## 2017-08-10 DIAGNOSIS — Z95.1 S/P CABG (CORONARY ARTERY BYPASS GRAFT): ICD-10-CM

## 2017-08-10 DIAGNOSIS — Z72.0 TOBACCO ABUSE: ICD-10-CM

## 2017-08-10 DIAGNOSIS — I25.110 CORONARY ARTERY DISEASE INVOLVING NATIVE CORONARY ARTERY OF NATIVE HEART WITH UNSTABLE ANGINA PECTORIS (HCC): Primary | ICD-10-CM

## 2017-08-10 PROCEDURE — 99213 OFFICE O/P EST LOW 20 MIN: CPT | Performed by: INTERNAL MEDICINE

## 2017-08-10 RX ORDER — HYDROCHLOROTHIAZIDE 25 MG/1
25 TABLET ORAL DAILY
COMMUNITY
End: 2019-03-07

## 2017-08-10 RX ORDER — DULOXETIN HYDROCHLORIDE 30 MG/1
30 CAPSULE, DELAYED RELEASE ORAL DAILY
COMMUNITY
End: 2017-10-12 | Stop reason: ALTCHOICE

## 2017-08-10 RX ORDER — DIPHENHYDRAMINE HCL 25 MG
25 CAPSULE ORAL EVERY 6 HOURS PRN
COMMUNITY
End: 2021-03-22

## 2017-08-10 RX ORDER — TADALAFIL 5 MG/1
5 TABLET ORAL DAILY PRN
COMMUNITY
End: 2017-10-12 | Stop reason: ALTCHOICE

## 2017-08-10 NOTE — PROGRESS NOTES
Paddy Brantley  56 y.o. male    08/10/2017  1. Coronary artery disease involving native coronary artery of native heart with unstable angina pectoris    2. Essential hypertension    3. S/P CABG (coronary artery bypass graft)    4. Tobacco abuse        History of Present Illness    Mr. Brantley is here for follow-up of his above stated problems.  He denied any chest pain or shortness of breath and has been compliant with his medications.  I understand that he was diagnosed to have peripheral vascular disease. He claims to have cut back on smoking.  No signs of angina or congestive heart failure was noted.  Blood pressure was in the normal range.        SUBJECTIVE    Allergies   Allergen Reactions   • Amiodarone    • Contrast Dye    • Dexamethasone    • Diphenhydramine    • Iodine    • Livalo [Pitavastatin] Swelling   • Multivitamins      Oyster/calcium carbonate   • Penicillins    • Plavix [Clopidogrel Bisulfate]    • Proton Pump Inhibitors    • Shellfish-Derived Products    • Statins    • Tetracyclines & Related          Past Medical History:   Diagnosis Date   • Accident caused by hypodermic needle    • Guerra esophagus    • CAD (coronary artery disease)    • Contact with and (suspected) exposure to potentially hazardous body fluids    • Diabetes mellitus    • GERD (gastroesophageal reflux disease)    • Kidney stones    • Meniere's disease    • PAD (peripheral artery disease)          Past Surgical History:   Procedure Laterality Date   • APPENDECTOMY     • CARDIAC CATHETERIZATION     • CAROTID STENT     • CHEST TUBE INSERTION     • CORONARY ARTERY BYPASS GRAFT     • GALLBLADDER SURGERY     • INCISION AND DRAINAGE ABSCESS     • INGUINAL HERNIA REPAIR     • LUMBAR LAMINECTOMY     • UMBILICAL HERNIA REPAIR           No family history on file.      Social History     Social History   • Marital status: Single     Spouse name: N/A   • Number of children: N/A   • Years of education: N/A     Occupational History   •  "Not on file.     Social History Main Topics   • Smoking status: Former Smoker   • Smokeless tobacco: Never Used   • Alcohol use No   • Drug use: No   • Sexual activity: Defer     Other Topics Concern   • Not on file     Social History Narrative         Current Outpatient Prescriptions   Medication Sig Dispense Refill   • aspirin 81 MG tablet Take 1 tablet by mouth Daily. 30 tablet 11   • CARTIA  MG 24 hr capsule TAKE ONE CAPSULE BY MOUTH ONCE DAILY 90 capsule 2   • diphenhydrAMINE (BENADRYL) 25 mg capsule Take 25 mg by mouth Every 6 (Six) Hours As Needed for Itching.     • DULoxetine (CYMBALTA) 30 MG capsule Take 30 mg by mouth Daily.     • hydrochlorothiazide (HYDRODIURIL) 25 MG tablet Take 25 mg by mouth Daily.     • HYDROcodone-acetaminophen (NORCO)  MG per tablet Take 1 tablet by mouth Every 4 (Four) Hours As Needed for moderate pain (4-6). 40 tablet 0   • insulin NPH (HUMULIN N) 100 UNIT/ML injection Inject 42 Units under the skin 2 (Two) Times a Day.     • insulin regular (humuLIN R) 500 UNIT/ML CONCENTRATED injection Inject  under the skin. Use as directed     • raNITIdine (ZANTAC) 300 MG tablet Take 300 mg by mouth 2 (Two) Times a Day.     • tadalafil (CIALIS) 5 MG tablet Take 5 mg by mouth Daily As Needed for erectile dysfunction.       No current facility-administered medications for this visit.          OBJECTIVE    /72  Pulse 75  Ht 68\" (172.7 cm)  Wt 218 lb (98.9 kg)  BMI 33.15 kg/m2        Review of Systems     Constitutional:  Denies recent weight loss, weight gain, fever or chills, no change in exercise tolerance     HENT:  Denies any hearing loss, epistaxis, hoarseness, or difficulty speaking.     Eyes: Wears eyeglasses or contact lenses     Respiratory:  Denies dyspnea with exertion,no cough, wheezing, or hemoptysis.     Cardiovascular: Negative for palpations, chest pain, orthopnea, PND, peripheral edema, syncope. Has claudication.     Gastrointestinal:  Denies change in " bowel habits, dyspepsia, ulcer disease, hematochezia, or melena.     Endocrine: Negative for cold intolerance, heat intolerance, polydipsia, polyphagia and polyuria.     Genitourinary: Negative.      Musculoskeletal: Denies any history of arthritic symptoms or back problems     Skin:  Denies any change in hair or nails, rashes, or skin lesions.       Physical Exam     Constitutional: Cooperative, alert and oriented, well-developed,  in no acute distress.     HENT:   Head: Normocephalic, normal hair patterns, no masses or tenderness.  Ears, Nose, and Throat: No gross abnormalities. No pallor or cyanosis.   Eyes: EOMS intact, PERRL, conjunctivae and lids unremarkable. Fundoscopic exam and visual fields not performed.   Neck: No palpable masses or adenopathy, no thyromegaly, no JVD, carotid pulses are full and equal bilaterally and without  Bruits.     Cardiovascular: Regular rhythm, S1 and S2 normal, no S3 or S4. No murmurs, gallops, or rubs detected.     Pulmonary/Chest: Chest: normal symmetry, no tenderness to palpation, normal respiratory excursion,  no use of accessory muscles.            Pulmonary: Normal breath sounds. No rales or ronchi.    Abdominal: Abdomen soft, bowel sounds normoactive, no masses, no hepatosplenomegaly, non-tender, no bruits.     Musculoskeletal: No deformities, clubbing, cyanosis, erythema, or edema observed. Normal muscle strength and tone.    Neurological: No gross motor or sensory deficits noted, affect appropriate, oriented to time, person, place.     Skin: Warm and dry to the touch, no apparent skin lesions or masses noted.         Procedures      Lab Results   Component Value Date    WBC 13.1 (H) 01/11/2017    HGB 11.0 (L) 01/11/2017    HCT 34.0 (L) 01/11/2017    MCV 86.7 01/11/2017     01/11/2017     Lab Results   Component Value Date    GLUCOSE 108 (H) 01/04/2017    BUN 38 (H) 01/04/2017    CREATININE 1.0 01/04/2017    CO2 30 01/04/2017    CALCIUM 8.7 01/04/2017    ALBUMIN  3.6 01/03/2017    AST 15 (L) 01/03/2017    ALT 31 01/03/2017     No results found for: CHOL  Lab Results   Component Value Date    TRIG 163 11/18/2016     Lab Results   Component Value Date    HDL 48 (L) 11/18/2016     Lab Results   Component Value Date    LDLCALC 135 (H) 11/18/2016     No results found for: LDL  No results found for: HDLLDLRATIO  No components found for: CHOLHDL  Lab Results   Component Value Date    HGBA1C 6.9 (H) 11/17/2016     Lab Results   Component Value Date    TSH 0.21 (L) 11/18/2016    J4GBODK 96 (L) 11/18/2016    [id number] 6.3 11/18/2016           ASSESSMENT AND PLAN  Mr. Brantley has no clinical evidence of progression of coronary artery disease.  His present antiplatelet therapy with aspirin, antihypertensive therapy with Cartia XT has been continued.  Unfortunately he will not take statins.  He does have claudication and he might benefit from starting Zontivity is a new antiplatelet therapy which is particularly useful in patients with peripheral vascular disease.    Paddy was seen today for follow-up.    Diagnoses and all orders for this visit:    Coronary artery disease involving native coronary artery of native heart with unstable angina pectoris    Essential hypertension    S/P CABG (coronary artery bypass graft)    Tobacco abuse        Gordo Resendez MD  8/10/2017  3:29 PM

## 2017-09-11 RX ORDER — FERROUS SULFATE 325(65) MG
TABLET ORAL
Qty: 60 TABLET | Refills: 0 | OUTPATIENT
Start: 2017-09-11

## 2017-10-11 DIAGNOSIS — Z95.1 S/P CABG (CORONARY ARTERY BYPASS GRAFT): Primary | ICD-10-CM

## 2017-10-12 ENCOUNTER — OFFICE VISIT (OUTPATIENT)
Dept: CARDIAC SURGERY | Facility: CLINIC | Age: 57
End: 2017-10-12

## 2017-10-12 ENCOUNTER — HOSPITAL ENCOUNTER (OUTPATIENT)
Dept: GENERAL RADIOLOGY | Facility: HOSPITAL | Age: 57
Discharge: HOME | End: 2017-10-12
Admitting: NURSE PRACTITIONER

## 2017-10-12 VITALS
HEART RATE: 65 BPM | DIASTOLIC BLOOD PRESSURE: 60 MMHG | SYSTOLIC BLOOD PRESSURE: 100 MMHG | BODY MASS INDEX: 33.65 KG/M2 | WEIGHT: 222 LBS | OXYGEN SATURATION: 97 % | TEMPERATURE: 97.3 F | HEIGHT: 68 IN

## 2017-10-12 DIAGNOSIS — Z95.1 S/P CABG (CORONARY ARTERY BYPASS GRAFT): ICD-10-CM

## 2017-10-12 DIAGNOSIS — M96.89 NONUNION OF STERNUM AFTER STERNOTOMY: Primary | ICD-10-CM

## 2017-10-12 PROCEDURE — 99213 OFFICE O/P EST LOW 20 MIN: CPT | Performed by: NURSE PRACTITIONER

## 2017-10-12 PROCEDURE — 71020 HC CHEST PA AND LATERAL: CPT

## 2017-10-12 RX ORDER — ANTIARTHRITIC COMBINATION NO.2 900 MG
5000 TABLET ORAL NIGHTLY
COMMUNITY

## 2017-10-12 RX ORDER — MULTIVIT WITH MINERALS/LUTEIN
1000 TABLET ORAL 2 TIMES DAILY
Status: ON HOLD | COMMUNITY
End: 2020-09-30

## 2017-10-12 RX ORDER — ASPIRIN 325 MG
325 TABLET ORAL EVERY 12 HOURS SCHEDULED
COMMUNITY
End: 2022-05-27

## 2017-10-12 RX ORDER — VITAMIN B COMPLEX
1 TABLET ORAL DAILY
Status: ON HOLD | COMMUNITY
End: 2020-09-30

## 2017-10-12 NOTE — PROGRESS NOTES
Subjective   Patient ID: Paddy Brantley is a 57 y.o. male is here today as a self referral for evaluation of sternal pain and popping.   Chief Complaint   Patient presents with   • Coronary Artery Disease     pain in chest   With popping and clicking of sternum with movement.  PCP Rian Resendez     History of Present Illness  Mr Brantley is gentleman  known CAD with PCI 2012, DM, DLP.   He underwent CABG X 4  11/22/16 with unremarkable recovery and dc home on POD 3. He returned to hospital with SOA, Lg Left Hemothorax was drained with CT placement approx 4L total. Remained stable, mild anemia. Plavix was stopped as he was allergic.  Returns today as he called requesting to be seen concerning sternal popping and movement with discomfort.  Has increased over last few weeks.  Recent history:  11/2016: PFT: FEV1 31% Severe Restrictive lung disease.  11/2016: Carotid duplex: BICA <50%  11/2016: Echo: EF 55%  11/22/16: CABG x4 LIMA-LAD, SVG-OM, SVG-DX, SVG-PDA, EVH  1/3/17: CXR: LARGE LEFT HEMOTHORAX, CT Guided thoracentesis 500cc  1/4/16: CT placed 2.5L sanginous fluid drained   2/28/17: CXR: Improved Small Left effusion  10/12/17 CXR:  Atelectasis  Sternal wire fracture     The following portions of the patient's history were reviewed and updated as appropriate: allergies, current medications, past family history, past medical history, past social history, past surgical history and problem list.    Allergies   Allergen Reactions   • Amiodarone    • Contrast Dye    • Dexamethasone    • Diphenhydramine    • Iodine    • Livalo [Pitavastatin] Swelling   • Multivitamins      Oyster/calcium carbonate   • Penicillins    • Plavix [Clopidogrel Bisulfate]    • Proton Pump Inhibitors    • Shellfish-Derived Products    • Statins    • Tetracyclines & Related        Current Outpatient Prescriptions:   •  ascorbic acid (VITAMIN C) 1000 MG tablet, Take 1,000 mg by mouth Daily., Disp: , Rfl:   •  aspirin 325 MG tablet, Take 325  mg by mouth Daily., Disp: , Rfl:   •  Biotin 5000 MCG tablet, Take  by mouth., Disp: , Rfl:   •  CARTIA  MG 24 hr capsule, TAKE ONE CAPSULE BY MOUTH ONCE DAILY, Disp: 90 capsule, Rfl: 2  •  Coenzyme Q10 (COQ10) 100 MG capsule, Take  by mouth., Disp: , Rfl:   •  diphenhydrAMINE (BENADRYL) 25 mg capsule, Take 25 mg by mouth Every 6 (Six) Hours As Needed for Itching., Disp: , Rfl:   •  hydrochlorothiazide (HYDRODIURIL) 25 MG tablet, Take 25 mg by mouth Daily., Disp: , Rfl:   •  HYDROcodone-acetaminophen (NORCO)  MG per tablet, Take 1 tablet by mouth Every 4 (Four) Hours As Needed for moderate pain (4-6)., Disp: 40 tablet, Rfl: 0  •  insulin NPH (HUMULIN N) 100 UNIT/ML injection, Inject 42 Units under the skin 2 (Two) Times a Day., Disp: , Rfl:   •  insulin regular (humuLIN R) 500 UNIT/ML CONCENTRATED injection, Inject  under the skin. Use as directed, Disp: , Rfl:   •  raNITIdine (ZANTAC) 300 MG tablet, Take 300 mg by mouth 2 (Two) Times a Day., Disp: , Rfl:     Review of Systems   Constitution: Negative for chills, decreased appetite, fever, weakness and malaise/fatigue.   HENT: Negative for hearing loss, hoarse voice and nosebleeds.    Eyes: Negative for visual disturbance.   Cardiovascular: Positive for chest pain. Negative for claudication, cyanosis, dyspnea on exertion and leg swelling.        Sternum popping clicking with movement    Respiratory: Negative for cough, hemoptysis and shortness of breath.    Hematologic/Lymphatic: Does not bruise/bleed easily.   Skin: Positive for poor wound healing. Negative for color change and dry skin.   Musculoskeletal: Negative for falls, muscle cramps and muscle weakness.        Sternal bulge    Gastrointestinal: Negative for abdominal pain, anorexia, hematemesis and melena.   Genitourinary: Negative for hematuria.   Neurological: Negative for difficulty with concentration, dizziness, numbness and paresthesias.   Psychiatric/Behavioral: Negative for altered mental  status.   Allergic/Immunologic: Negative for hives.        Objective   Physical Exam   Constitutional: He is oriented to person, place, and time. He appears well-nourished.   HENT:   Head: Normocephalic.   Mouth/Throat: Oropharynx is clear and moist.   Eyes: Conjunctivae are normal. Pupils are equal, round, and reactive to light.   Neck: No JVD present.   Cardiovascular: Normal rate, regular rhythm, normal heart sounds and intact distal pulses.    Sternum unstable to moderate pressure with clicking      Pulmonary/Chest: Effort normal. No stridor.   Exp rhonchi   Sternum tender   Abdominal: Soft. Bowel sounds are normal. There is no tenderness.   Musculoskeletal: He exhibits no edema.   Neurological: He is alert and oriented to person, place, and time.   Skin: Skin is warm and dry. No erythema.   Psychiatric: Judgment normal.   Nursing note and vitals reviewed.      Vitals:    10/12/17 1320   BP: 100/60   Pulse: 65   Temp: 97.3 °F (36.3 °C)   SpO2: 97%         Assessment/Plan   Independent Review of Radiographic Studies:    10/12/17 CXR:  Atelectasis  Sternal wire fracture     1. 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  Discussed with Dr Sutton, patients preference    - CT Chest Without Contrast; Future

## 2017-10-12 NOTE — PATIENT INSTRUCTIONS
Non union of sternum with movement  Will get CT Scan of chest NO CONTRAST to check bone placement  Then will follow to discuss surgical options

## 2017-10-18 ENCOUNTER — HOSPITAL ENCOUNTER (OUTPATIENT)
Dept: CT IMAGING | Facility: HOSPITAL | Age: 57
Discharge: HOME | End: 2017-10-18
Admitting: NURSE PRACTITIONER

## 2017-10-18 DIAGNOSIS — M96.89 NONUNION OF STERNUM AFTER STERNOTOMY: ICD-10-CM

## 2017-10-18 PROCEDURE — 71250 CT THORAX DX C-: CPT

## 2017-10-26 ENCOUNTER — APPOINTMENT (OUTPATIENT)
Dept: PREADMISSION TESTING | Facility: HOSPITAL | Age: 57
End: 2017-10-26

## 2017-10-26 ENCOUNTER — HOSPITAL ENCOUNTER (OUTPATIENT)
Facility: HOSPITAL | Age: 57
Setting detail: SURGERY ADMIT
End: 2017-10-26
Attending: THORACIC SURGERY (CARDIOTHORACIC VASCULAR SURGERY) | Admitting: THORACIC SURGERY (CARDIOTHORACIC VASCULAR SURGERY)

## 2017-10-26 ENCOUNTER — OFFICE VISIT (OUTPATIENT)
Dept: CARDIAC SURGERY | Facility: CLINIC | Age: 57
End: 2017-10-26

## 2017-10-26 VITALS
BODY MASS INDEX: 34.29 KG/M2 | WEIGHT: 226.25 LBS | SYSTOLIC BLOOD PRESSURE: 134 MMHG | DIASTOLIC BLOOD PRESSURE: 80 MMHG | OXYGEN SATURATION: 94 % | HEART RATE: 89 BPM | HEIGHT: 68 IN

## 2017-10-26 VITALS
HEIGHT: 68 IN | OXYGEN SATURATION: 95 % | BODY MASS INDEX: 34.25 KG/M2 | SYSTOLIC BLOOD PRESSURE: 150 MMHG | HEART RATE: 87 BPM | WEIGHT: 226 LBS | DIASTOLIC BLOOD PRESSURE: 80 MMHG | RESPIRATION RATE: 18 BRPM

## 2017-10-26 DIAGNOSIS — M96.89 NONUNION OF STERNUM AFTER STERNOTOMY: ICD-10-CM

## 2017-10-26 DIAGNOSIS — M96.89 NONUNION OF STERNUM AFTER STERNOTOMY: Primary | ICD-10-CM

## 2017-10-26 LAB
ABO GROUP BLD: NORMAL
ALBUMIN SERPL-MCNC: 4.2 G/DL (ref 3.4–4.8)
ALBUMIN/GLOB SERPL: 1.6 G/DL (ref 1.1–1.8)
ALP SERPL-CCNC: 46 U/L (ref 38–126)
ALT SERPL W P-5'-P-CCNC: 50 U/L (ref 21–72)
ANION GAP SERPL CALCULATED.3IONS-SCNC: 10 MMOL/L (ref 5–15)
AST SERPL-CCNC: 26 U/L (ref 17–59)
BILIRUB SERPL-MCNC: 0.3 MG/DL (ref 0.2–1.3)
BLD GP AB SCN SERPL QL: NEGATIVE
BUN BLD-MCNC: 24 MG/DL (ref 7–21)
BUN/CREAT SERPL: 18.6 (ref 7–25)
CALCIUM SPEC-SCNC: 9.9 MG/DL (ref 8.4–10.2)
CHLORIDE SERPL-SCNC: 102 MMOL/L (ref 95–110)
CO2 SERPL-SCNC: 30 MMOL/L (ref 22–31)
CREAT BLD-MCNC: 1.29 MG/DL (ref 0.7–1.3)
DEPRECATED RDW RBC AUTO: 40.8 FL (ref 35.1–43.9)
ERYTHROCYTE [DISTWIDTH] IN BLOOD BY AUTOMATED COUNT: 12.7 % (ref 11.5–14.5)
GFR SERPL CREATININE-BSD FRML MDRD: 57 ML/MIN/1.73 (ref 60–130)
GLOBULIN UR ELPH-MCNC: 2.6 GM/DL (ref 2.3–3.5)
GLUCOSE BLD-MCNC: 191 MG/DL (ref 60–100)
HCT VFR BLD AUTO: 47.8 % (ref 39–49)
HGB BLD-MCNC: 16.5 G/DL (ref 13.7–17.3)
Lab: NORMAL
MCH RBC QN AUTO: 30.1 PG (ref 26.5–34)
MCHC RBC AUTO-ENTMCNC: 34.5 G/DL (ref 31.5–36.3)
MCV RBC AUTO: 87.2 FL (ref 80–98)
MRSA DNA SPEC QL NAA+PROBE: NEGATIVE
PLATELET # BLD AUTO: 173 10*3/MM3 (ref 150–450)
PMV BLD AUTO: 10.1 FL (ref 8–12)
POTASSIUM BLD-SCNC: 4.3 MMOL/L (ref 3.5–5.1)
PROT SERPL-MCNC: 6.8 G/DL (ref 6.3–8.6)
RBC # BLD AUTO: 5.48 10*6/MM3 (ref 4.37–5.74)
RH BLD: POSITIVE
SODIUM BLD-SCNC: 142 MMOL/L (ref 137–145)
WBC NRBC COR # BLD: 8.92 10*3/MM3 (ref 3.2–9.8)

## 2017-10-26 PROCEDURE — 85027 COMPLETE CBC AUTOMATED: CPT | Performed by: NURSE PRACTITIONER

## 2017-10-26 PROCEDURE — 80053 COMPREHEN METABOLIC PANEL: CPT | Performed by: NURSE PRACTITIONER

## 2017-10-26 PROCEDURE — 93010 ELECTROCARDIOGRAM REPORT: CPT | Performed by: INTERNAL MEDICINE

## 2017-10-26 PROCEDURE — 93005 ELECTROCARDIOGRAM TRACING: CPT

## 2017-10-26 PROCEDURE — 86900 BLOOD TYPING SEROLOGIC ABO: CPT | Performed by: NURSE PRACTITIONER

## 2017-10-26 PROCEDURE — 36415 COLL VENOUS BLD VENIPUNCTURE: CPT

## 2017-10-26 PROCEDURE — 86901 BLOOD TYPING SEROLOGIC RH(D): CPT | Performed by: NURSE PRACTITIONER

## 2017-10-26 PROCEDURE — 99213 OFFICE O/P EST LOW 20 MIN: CPT | Performed by: NURSE PRACTITIONER

## 2017-10-26 PROCEDURE — 86850 RBC ANTIBODY SCREEN: CPT | Performed by: NURSE PRACTITIONER

## 2017-10-26 PROCEDURE — 87641 MR-STAPH DNA AMP PROBE: CPT | Performed by: NURSE PRACTITIONER

## 2017-10-26 RX ORDER — DILTIAZEM HYDROCHLORIDE 180 MG/1
180 CAPSULE, COATED, EXTENDED RELEASE ORAL NIGHTLY
COMMUNITY
End: 2018-03-26 | Stop reason: SDUPTHER

## 2017-10-26 RX ORDER — DEXTROSE AND SODIUM CHLORIDE 5; .45 G/100ML; G/100ML
50 INJECTION, SOLUTION INTRAVENOUS CONTINUOUS
Status: CANCELLED | OUTPATIENT
Start: 2017-11-14

## 2017-10-26 RX ORDER — HUMAN INSULIN 100 [IU]/ML
22 INJECTION, SOLUTION SUBCUTANEOUS 2 TIMES DAILY
COMMUNITY
Start: 2017-10-19

## 2017-10-26 NOTE — PROGRESS NOTES
Subjective   Patient ID: Paddy Brantley is a 57 y.o. male is here today in follow up for sternal dehiscence, CT results, and to discuss surgical options.    Chief Complaint   Patient presents with   • Carotid Artery Disease   • CT Results   With popping and clicking of sternum with movement.  PCP Enochs  VIVIENNE Resendez     Carotid Artery Disease   Associated symptoms include chest pain. Pertinent negatives include no abdominal pain, anorexia, chills, coughing, fever, numbness or weakness.     Mr Brantley is gentleman  known CAD with PCI 2012, DM, DLP.   He underwent CABG X 4  11/22/16 with unremarkable recovery and dc home on POD 3. He returned to hospital with SOA, Lg Left Hemothorax was drained with CT placement approx 4L total. Remained stable, mild anemia. Plavix was stopped as he was allergic.  Returns today as he called requesting to be seen concerning sternal popping and movement with discomfort.  Has increased over last few weeks.  Recent history:  11/2016: PFT: FEV1 31% Severe Restrictive lung disease.  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      The following portions of the patient's history were reviewed and updated as appropriate: allergies, current medications, past family history, past medical history, past social history, past surgical history and problem list.    Allergies   Allergen Reactions   • Amiodarone Anaphylaxis   • Contrast Dye Anaphylaxis   • Iodine Anaphylaxis   • Proton Pump Inhibitors Shortness Of Breath   • Shellfish-Derived Products Anaphylaxis   • Dexamethasone Other (See Comments)   • Livalo [Pitavastatin] Swelling   •  "Multivitamins      Oyster/calcium carbonate   • Plavix [Clopidogrel Bisulfate] Other (See Comments)     bleeding   • Statins Swelling   • Tetracyclines & Related Other (See Comments)     \"skin falls off\"   • Penicillins Rash       Current Outpatient Prescriptions:   •  ascorbic acid (VITAMIN C) 1000 MG tablet, Take 1,000 mg by mouth Daily., Disp: , Rfl:   •  aspirin 325 MG tablet, Take 325 mg by mouth Daily., Disp: , Rfl:   •  Biotin 5000 MCG tablet, Take 10,000 mcg by mouth Daily., Disp: , Rfl:   •  Coenzyme Q10 (COQ10) 100 MG capsule, Take 1 tablet by mouth Daily., Disp: , Rfl:   •  diphenhydrAMINE (BENADRYL) 25 mg capsule, Take 25 mg by mouth Every 6 (Six) Hours As Needed for Itching., Disp: , Rfl:   •  hydrochlorothiazide (HYDRODIURIL) 25 MG tablet, Take 25 mg by mouth Daily., Disp: , Rfl:   •  HYDROcodone-acetaminophen (NORCO)  MG per tablet, Take 1 tablet by mouth Every 4 (Four) Hours As Needed for moderate pain (4-6)., Disp: 40 tablet, Rfl: 0  •  insulin NPH (HUMULIN N) 100 UNIT/ML injection, Inject 42 Units under the skin 2 (Two) Times a Day., Disp: , Rfl:   •  NOVOLIN R 100 UNIT/ML injection, Inject 22 Units as directed 2 (Two) Times a Day., Disp: , Rfl:   •  raNITIdine (ZANTAC) 300 MG tablet, Take 300 mg by mouth 2 (Two) Times a Day., Disp: , Rfl:   •  diltiaZEM CD (CARTIA XT) 180 MG 24 hr capsule, Take 180 mg by mouth Every Night., Disp: , Rfl:     Review of Systems   Constitution: Negative for chills, decreased appetite, fever, weakness and malaise/fatigue.   HENT: Negative for hearing loss, hoarse voice and nosebleeds.    Eyes: Negative for visual disturbance.   Cardiovascular: Positive for chest pain. Negative for claudication, cyanosis, dyspnea on exertion and leg swelling.        Sternum popping clicking with movement worse since recent fall    Respiratory: Negative for cough, hemoptysis and shortness of breath.    Hematologic/Lymphatic: Does not bruise/bleed easily.   Skin: Positive for poor " wound healing. Negative for color change and dry skin.   Musculoskeletal: Positive for falls (soft tissue and skin abrasion to RLE ). Negative for muscle cramps and muscle weakness.        Sternal bulge    Gastrointestinal: Negative for abdominal pain, anorexia, hematemesis and melena.   Genitourinary: Negative for hematuria.   Neurological: Negative for difficulty with concentration, dizziness, numbness and paresthesias.   Psychiatric/Behavioral: Negative for altered mental status.   Allergic/Immunologic: Negative for hives.        Objective   Physical Exam   Constitutional: He is oriented to person, place, and time. He appears well-nourished.   HENT:   Head: Normocephalic.   Mouth/Throat: Oropharynx is clear and moist.   Eyes: Conjunctivae are normal. Pupils are equal, round, and reactive to light.   Neck: No JVD present.   Cardiovascular: Normal rate, regular rhythm, normal heart sounds and intact distal pulses.    Sternum unstable to moderate pressure with clicking      Pulmonary/Chest: Effort normal. No stridor.   Exp rhonchi   Sternum tender   Abdominal: Soft. Bowel sounds are normal. There is no tenderness.   Musculoskeletal: He exhibits no edema.   Neurological: He is alert and oriented to person, place, and time.   Skin: Skin is warm and dry. No erythema.   RLE multiple abrasions, cuts and bruising from recent fall    Psychiatric: His behavior is normal. Judgment normal.   Nursing note and vitals reviewed.      Vitals:    10/26/17 1331   BP: 134/80   Pulse: 89   SpO2: 94%         Assessment/Plan   Independent Review of Radiographic Studies:    10/18/17  CT Chest:  Fracture nonunion midsternal body at 4th sternal wires.      1. 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  Studies demonstrate sternal instability and fracture.  Requires surgical debridement and repair and fixation with  orthopedic devices (plates and screws).    Paddy Brantley understands risks, including but not limited to: pain, infection, bleeding, nerve or blood vessel injury, need for other operative proceddures.  .  Benefits: relief of symptoms.  Options: medical therapy, alternative imaging discussed. Paddy Brantley understands and wishes to proceed with plan.  Sternal debridement with fixation of sternal fracture and related procedures scheduled for 11/14/17.   Thank you for the opportunity to participate in this patient's care.

## 2017-12-04 DIAGNOSIS — M96.89 NONUNION OF STERNUM AFTER STERNOTOMY: Primary | ICD-10-CM

## 2017-12-04 RX ORDER — SODIUM CHLORIDE 9 MG/ML
100 INJECTION, SOLUTION INTRAVENOUS CONTINUOUS
Status: CANCELLED | OUTPATIENT
Start: 2017-12-19

## 2017-12-14 NOTE — PAT
Patient phoned PAT stated he was told he could pre op via phone since his last surgery was cancelled.

## 2017-12-14 NOTE — DISCHARGE INSTRUCTIONS
[facility]  Pre-op Information and Guidelines    You will be called after 2 p.m. the day before your surgery (Friday for Monday surgery) and notified of your time for arrival and approximate surgery time.  If you have not received a call by 4P.M., please contact Same Day Surgery at [phone] of if outside [facility] call [phone].    Please Follow these Important Safety Guidelines:    • The morning of your procedure, take only the medications listed below with   A sip of water:_____________________________________________       ______________________________________________    • DO NOT eat or drink anything after 12:00 midnight the night before surgery  Specific instructions concerning drinking clear liquids will be discussed during  the pre-surgery instruction call the day before your surgery.    • If you take a blood thinner (ex. Plavix, Coumadin, aspirin), ask your doctor when to stop it before surgery  STOP DATE: _________________    • Only 2 visitors are allowed in patient rooms at a time  Your visitors will be asked to wait in the lobby until the admission process is complete with the exception of a parent with a child and patients in need of special assistance.    • YOU CANNOT DRIVE YOURSELF HOME  You must be accompanied by someone who will be responsible for driving you home after surgery and for your care at home.    • DO NOT chew gum, use breath mints, hard candy, or smoke the day of surgery  • DO NOT drink alcohol for at least 24 hours before your surgery  • DO NOT wear any jewelry and remove all body piercing before coming to the hospital  • DO NOT wear make-up to the hospital  • If you are having surgery on an extremity (arm/leg/foot) remove nail polish/artificial nails on the surgical side  • Clothing, glasses, contacts, dentures, and hairpieces must be removed before surgery  • Bathe the night before or the morning of your surgery and do not use powders/lotions on  skin.

## 2017-12-19 ENCOUNTER — ANESTHESIA EVENT (OUTPATIENT)
Dept: PERIOP | Facility: HOSPITAL | Age: 57
End: 2017-12-19

## 2017-12-19 ENCOUNTER — APPOINTMENT (OUTPATIENT)
Dept: GENERAL RADIOLOGY | Facility: HOSPITAL | Age: 57
End: 2017-12-19

## 2017-12-19 ENCOUNTER — HOSPITAL ENCOUNTER (INPATIENT)
Facility: HOSPITAL | Age: 57
LOS: 2 days | Discharge: HOME | End: 2017-12-21
Attending: THORACIC SURGERY (CARDIOTHORACIC VASCULAR SURGERY) | Admitting: THORACIC SURGERY (CARDIOTHORACIC VASCULAR SURGERY)

## 2017-12-19 ENCOUNTER — ANESTHESIA (OUTPATIENT)
Dept: PERIOP | Facility: HOSPITAL | Age: 57
End: 2017-12-19

## 2017-12-19 DIAGNOSIS — M96.89 NONUNION OF STERNUM AFTER STERNOTOMY: ICD-10-CM

## 2017-12-19 LAB
ABO GROUP BLD: NORMAL
ANION GAP SERPL CALCULATED.3IONS-SCNC: 11 MMOL/L (ref 5–15)
BLD GP AB SCN SERPL QL: NEGATIVE
BUN BLD-MCNC: 23 MG/DL (ref 7–21)
BUN/CREAT SERPL: 22.8 (ref 7–25)
CALCIUM SPEC-SCNC: 9 MG/DL (ref 8.4–10.2)
CHLORIDE SERPL-SCNC: 103 MMOL/L (ref 95–110)
CO2 SERPL-SCNC: 25 MMOL/L (ref 22–31)
CREAT BLD-MCNC: 1.01 MG/DL (ref 0.7–1.3)
GFR SERPL CREATININE-BSD FRML MDRD: 76 ML/MIN/1.73 (ref 56–130)
GLUCOSE BLD-MCNC: 83 MG/DL (ref 60–100)
GLUCOSE BLDC GLUCOMTR-MCNC: 108 MG/DL (ref 70–130)
GLUCOSE BLDC GLUCOMTR-MCNC: 136 MG/DL (ref 70–130)
GLUCOSE BLDC GLUCOMTR-MCNC: 88 MG/DL (ref 70–130)
Lab: NORMAL
POTASSIUM BLD-SCNC: 3.9 MMOL/L (ref 3.5–5.1)
RH BLD: POSITIVE
SODIUM BLD-SCNC: 139 MMOL/L (ref 137–145)

## 2017-12-19 PROCEDURE — 71010 HC CHEST PA OR AP: CPT

## 2017-12-19 PROCEDURE — 25010000002 MIDAZOLAM PER 1 MG: Performed by: NURSE ANESTHETIST, CERTIFIED REGISTERED

## 2017-12-19 PROCEDURE — 25010000002 HYDROMORPHONE PER 4 MG: Performed by: NURSE ANESTHETIST, CERTIFIED REGISTERED

## 2017-12-19 PROCEDURE — 94760 N-INVAS EAR/PLS OXIMETRY 1: CPT

## 2017-12-19 PROCEDURE — 25010000002 PHENYLEPHRINE PER 1 ML: Performed by: NURSE ANESTHETIST, CERTIFIED REGISTERED

## 2017-12-19 PROCEDURE — C1713 ANCHOR/SCREW BN/BN,TIS/BN: HCPCS | Performed by: THORACIC SURGERY (CARDIOTHORACIC VASCULAR SURGERY)

## 2017-12-19 PROCEDURE — 88300 SURGICAL PATH GROSS: CPT | Performed by: PATHOLOGY

## 2017-12-19 PROCEDURE — 80048 BASIC METABOLIC PNL TOTAL CA: CPT | Performed by: ANESTHESIOLOGY

## 2017-12-19 PROCEDURE — 25010000002 PROMETHAZINE PER 50 MG: Performed by: THORACIC SURGERY (CARDIOTHORACIC VASCULAR SURGERY)

## 2017-12-19 PROCEDURE — 0PQ00ZZ REPAIR STERNUM, OPEN APPROACH: ICD-10-PCS | Performed by: THORACIC SURGERY (CARDIOTHORACIC VASCULAR SURGERY)

## 2017-12-19 PROCEDURE — 0JB60ZZ EXCISION OF CHEST SUBCUTANEOUS TISSUE AND FASCIA, OPEN APPROACH: ICD-10-PCS | Performed by: THORACIC SURGERY (CARDIOTHORACIC VASCULAR SURGERY)

## 2017-12-19 PROCEDURE — 86901 BLOOD TYPING SEROLOGIC RH(D): CPT | Performed by: THORACIC SURGERY (CARDIOTHORACIC VASCULAR SURGERY)

## 2017-12-19 PROCEDURE — 86900 BLOOD TYPING SEROLOGIC ABO: CPT | Performed by: THORACIC SURGERY (CARDIOTHORACIC VASCULAR SURGERY)

## 2017-12-19 PROCEDURE — 21750 REPAIR OF STERNUM SEPARATION: CPT | Performed by: THORACIC SURGERY (CARDIOTHORACIC VASCULAR SURGERY)

## 2017-12-19 PROCEDURE — 25010000003 MORPHINE PER 10 MG: Performed by: THORACIC SURGERY (CARDIOTHORACIC VASCULAR SURGERY)

## 2017-12-19 PROCEDURE — 25010000002 KETOROLAC TROMETHAMINE PER 15 MG: Performed by: THORACIC SURGERY (CARDIOTHORACIC VASCULAR SURGERY)

## 2017-12-19 PROCEDURE — 82962 GLUCOSE BLOOD TEST: CPT

## 2017-12-19 PROCEDURE — 88300 SURGICAL PATH GROSS: CPT | Performed by: THORACIC SURGERY (CARDIOTHORACIC VASCULAR SURGERY)

## 2017-12-19 PROCEDURE — 94640 AIRWAY INHALATION TREATMENT: CPT

## 2017-12-19 PROCEDURE — 25010000002 NEOSTIGMINE 10 MG/10ML SOLUTION: Performed by: NURSE ANESTHETIST, CERTIFIED REGISTERED

## 2017-12-19 PROCEDURE — 25010000002 FENTANYL CITRATE (PF) 250 MCG/5ML SOLUTION: Performed by: NURSE ANESTHETIST, CERTIFIED REGISTERED

## 2017-12-19 PROCEDURE — 25010000002 PROPOFOL 10 MG/ML EMULSION: Performed by: NURSE ANESTHETIST, CERTIFIED REGISTERED

## 2017-12-19 PROCEDURE — 86850 RBC ANTIBODY SCREEN: CPT | Performed by: THORACIC SURGERY (CARDIOTHORACIC VASCULAR SURGERY)

## 2017-12-19 PROCEDURE — 25010000002 ONDANSETRON PER 1 MG: Performed by: NURSE ANESTHETIST, CERTIFIED REGISTERED

## 2017-12-19 PROCEDURE — 25010000003 CEFAZOLIN PER 500 MG: Performed by: THORACIC SURGERY (CARDIOTHORACIC VASCULAR SURGERY)

## 2017-12-19 DEVICE — IMPLANTABLE DEVICE: Type: IMPLANTABLE DEVICE | Site: CHEST | Status: FUNCTIONAL

## 2017-12-19 RX ORDER — GLYCOPYRROLATE 0.2 MG/ML
INJECTION INTRAMUSCULAR; INTRAVENOUS AS NEEDED
Status: DISCONTINUED | OUTPATIENT
Start: 2017-12-19 | End: 2017-12-19 | Stop reason: SURG

## 2017-12-19 RX ORDER — DIPHENHYDRAMINE HYDROCHLORIDE 50 MG/ML
25 INJECTION INTRAMUSCULAR; INTRAVENOUS EVERY 6 HOURS PRN
Status: DISCONTINUED | OUTPATIENT
Start: 2017-12-19 | End: 2017-12-20

## 2017-12-19 RX ORDER — DIPHENHYDRAMINE HCL 25 MG
25 CAPSULE ORAL EVERY 6 HOURS PRN
Status: DISCONTINUED | OUTPATIENT
Start: 2017-12-19 | End: 2017-12-21 | Stop reason: HOSPADM

## 2017-12-19 RX ORDER — DIPHENHYDRAMINE HYDROCHLORIDE 50 MG/ML
12.5 INJECTION INTRAMUSCULAR; INTRAVENOUS
Status: DISCONTINUED | OUTPATIENT
Start: 2017-12-19 | End: 2017-12-19 | Stop reason: HOSPADM

## 2017-12-19 RX ORDER — ONDANSETRON 2 MG/ML
INJECTION INTRAMUSCULAR; INTRAVENOUS AS NEEDED
Status: DISCONTINUED | OUTPATIENT
Start: 2017-12-19 | End: 2017-12-19 | Stop reason: SURG

## 2017-12-19 RX ORDER — ONDANSETRON 4 MG/1
4 TABLET, ORALLY DISINTEGRATING ORAL EVERY 6 HOURS PRN
Status: DISCONTINUED | OUTPATIENT
Start: 2017-12-19 | End: 2017-12-21 | Stop reason: HOSPADM

## 2017-12-19 RX ORDER — EPHEDRINE SULFATE 50 MG/ML
5 INJECTION, SOLUTION INTRAVENOUS ONCE AS NEEDED
Status: DISCONTINUED | OUTPATIENT
Start: 2017-12-19 | End: 2017-12-19 | Stop reason: HOSPADM

## 2017-12-19 RX ORDER — ACETAMINOPHEN 650 MG/1
650 SUPPOSITORY RECTAL ONCE AS NEEDED
Status: DISCONTINUED | OUTPATIENT
Start: 2017-12-19 | End: 2017-12-19 | Stop reason: HOSPADM

## 2017-12-19 RX ORDER — HYDROCHLOROTHIAZIDE 25 MG/1
25 TABLET ORAL DAILY
Status: DISCONTINUED | OUTPATIENT
Start: 2017-12-19 | End: 2017-12-21 | Stop reason: HOSPADM

## 2017-12-19 RX ORDER — MORPHINE SULFATE 1 MG/ML
INJECTION INTRAVENOUS CONTINUOUS
Status: DISCONTINUED | OUTPATIENT
Start: 2017-12-19 | End: 2017-12-20

## 2017-12-19 RX ORDER — SODIUM CHLORIDE, SODIUM GLUCONATE, SODIUM ACETATE, POTASSIUM CHLORIDE, AND MAGNESIUM CHLORIDE 526; 502; 368; 37; 30 MG/100ML; MG/100ML; MG/100ML; MG/100ML; MG/100ML
INJECTION, SOLUTION INTRAVENOUS CONTINUOUS PRN
Status: DISCONTINUED | OUTPATIENT
Start: 2017-12-19 | End: 2017-12-19 | Stop reason: SURG

## 2017-12-19 RX ORDER — SENNA AND DOCUSATE SODIUM 50; 8.6 MG/1; MG/1
2 TABLET, FILM COATED ORAL NIGHTLY
Status: DISCONTINUED | OUTPATIENT
Start: 2017-12-19 | End: 2017-12-21 | Stop reason: HOSPADM

## 2017-12-19 RX ORDER — ACETAMINOPHEN 325 MG/1
650 TABLET ORAL EVERY 4 HOURS PRN
Status: DISCONTINUED | OUTPATIENT
Start: 2017-12-19 | End: 2017-12-21 | Stop reason: HOSPADM

## 2017-12-19 RX ORDER — FAMOTIDINE 40 MG/1
40 TABLET, FILM COATED ORAL 2 TIMES DAILY
Status: DISCONTINUED | OUTPATIENT
Start: 2017-12-19 | End: 2017-12-21 | Stop reason: HOSPADM

## 2017-12-19 RX ORDER — BISACODYL 10 MG
10 SUPPOSITORY, RECTAL RECTAL DAILY PRN
Status: DISCONTINUED | OUTPATIENT
Start: 2017-12-19 | End: 2017-12-21 | Stop reason: HOSPADM

## 2017-12-19 RX ORDER — SODIUM CHLORIDE 9 MG/ML
30 INJECTION, SOLUTION INTRAVENOUS CONTINUOUS
Status: DISCONTINUED | OUTPATIENT
Start: 2017-12-19 | End: 2017-12-21 | Stop reason: HOSPADM

## 2017-12-19 RX ORDER — ASCORBIC ACID 500 MG
1000 TABLET ORAL 2 TIMES DAILY
Status: DISCONTINUED | OUTPATIENT
Start: 2017-12-19 | End: 2017-12-21 | Stop reason: HOSPADM

## 2017-12-19 RX ORDER — PROMETHAZINE HYDROCHLORIDE 25 MG/ML
12.5 INJECTION, SOLUTION INTRAMUSCULAR; INTRAVENOUS ONCE
Status: COMPLETED | OUTPATIENT
Start: 2017-12-19 | End: 2017-12-19

## 2017-12-19 RX ORDER — PROPOFOL 10 MG/ML
VIAL (ML) INTRAVENOUS AS NEEDED
Status: DISCONTINUED | OUTPATIENT
Start: 2017-12-19 | End: 2017-12-19 | Stop reason: SURG

## 2017-12-19 RX ORDER — ACETAMINOPHEN 325 MG/1
650 TABLET ORAL ONCE AS NEEDED
Status: DISCONTINUED | OUTPATIENT
Start: 2017-12-19 | End: 2017-12-19 | Stop reason: HOSPADM

## 2017-12-19 RX ORDER — MIDAZOLAM HYDROCHLORIDE 1 MG/ML
INJECTION INTRAMUSCULAR; INTRAVENOUS AS NEEDED
Status: DISCONTINUED | OUTPATIENT
Start: 2017-12-19 | End: 2017-12-19 | Stop reason: SURG

## 2017-12-19 RX ORDER — NALOXONE HCL 0.4 MG/ML
0.2 VIAL (ML) INJECTION AS NEEDED
Status: DISCONTINUED | OUTPATIENT
Start: 2017-12-19 | End: 2017-12-19 | Stop reason: HOSPADM

## 2017-12-19 RX ORDER — NALOXONE HCL 0.4 MG/ML
0.1 VIAL (ML) INJECTION
Status: DISCONTINUED | OUTPATIENT
Start: 2017-12-19 | End: 2017-12-20

## 2017-12-19 RX ORDER — NEOSTIGMINE METHYLSULFATE 1 MG/ML
INJECTION, SOLUTION INTRAVENOUS AS NEEDED
Status: DISCONTINUED | OUTPATIENT
Start: 2017-12-19 | End: 2017-12-19 | Stop reason: SURG

## 2017-12-19 RX ORDER — ASPIRIN 325 MG
325 TABLET ORAL DAILY
Status: DISCONTINUED | OUTPATIENT
Start: 2017-12-20 | End: 2017-12-21 | Stop reason: HOSPADM

## 2017-12-19 RX ORDER — LIDOCAINE HYDROCHLORIDE 20 MG/ML
INJECTION, SOLUTION INFILTRATION; PERINEURAL AS NEEDED
Status: DISCONTINUED | OUTPATIENT
Start: 2017-12-19 | End: 2017-12-19 | Stop reason: SURG

## 2017-12-19 RX ORDER — VECURONIUM BROMIDE 20 MG/20ML
INJECTION, POWDER, LYOPHILIZED, FOR SOLUTION INTRAVENOUS AS NEEDED
Status: DISCONTINUED | OUTPATIENT
Start: 2017-12-19 | End: 2017-12-19 | Stop reason: SURG

## 2017-12-19 RX ORDER — ONDANSETRON 2 MG/ML
4 INJECTION INTRAMUSCULAR; INTRAVENOUS EVERY 6 HOURS PRN
Status: DISCONTINUED | OUTPATIENT
Start: 2017-12-19 | End: 2017-12-21 | Stop reason: HOSPADM

## 2017-12-19 RX ORDER — ONDANSETRON 2 MG/ML
4 INJECTION INTRAMUSCULAR; INTRAVENOUS ONCE AS NEEDED
Status: DISCONTINUED | OUTPATIENT
Start: 2017-12-19 | End: 2017-12-19 | Stop reason: HOSPADM

## 2017-12-19 RX ORDER — FLUMAZENIL 0.1 MG/ML
0.2 INJECTION INTRAVENOUS AS NEEDED
Status: DISCONTINUED | OUTPATIENT
Start: 2017-12-19 | End: 2017-12-19 | Stop reason: HOSPADM

## 2017-12-19 RX ORDER — ONDANSETRON 4 MG/1
4 TABLET, FILM COATED ORAL EVERY 6 HOURS PRN
Status: DISCONTINUED | OUTPATIENT
Start: 2017-12-19 | End: 2017-12-21 | Stop reason: HOSPADM

## 2017-12-19 RX ORDER — SODIUM CHLORIDE 9 MG/ML
1000 INJECTION, SOLUTION INTRAVENOUS CONTINUOUS PRN
Status: DISCONTINUED | OUTPATIENT
Start: 2017-12-19 | End: 2017-12-19 | Stop reason: HOSPADM

## 2017-12-19 RX ORDER — FENTANYL CITRATE 50 UG/ML
INJECTION, SOLUTION INTRAMUSCULAR; INTRAVENOUS AS NEEDED
Status: DISCONTINUED | OUTPATIENT
Start: 2017-12-19 | End: 2017-12-19 | Stop reason: SURG

## 2017-12-19 RX ORDER — ALBUTEROL SULFATE 2.5 MG/3ML
2.5 SOLUTION RESPIRATORY (INHALATION)
Status: DISCONTINUED | OUTPATIENT
Start: 2017-12-19 | End: 2017-12-21 | Stop reason: HOSPADM

## 2017-12-19 RX ORDER — SODIUM CHLORIDE 9 MG/ML
100 INJECTION, SOLUTION INTRAVENOUS CONTINUOUS
Status: DISCONTINUED | OUTPATIENT
Start: 2017-12-19 | End: 2017-12-19 | Stop reason: HOSPADM

## 2017-12-19 RX ORDER — NITROGLYCERIN 40 MG/100ML
5-200 INJECTION INTRAVENOUS
Status: DISCONTINUED | OUTPATIENT
Start: 2017-12-19 | End: 2017-12-19

## 2017-12-19 RX ORDER — KETOROLAC TROMETHAMINE 10 MG/1
10 TABLET, FILM COATED ORAL EVERY 8 HOURS
Status: DISCONTINUED | OUTPATIENT
Start: 2017-12-20 | End: 2017-12-21 | Stop reason: HOSPADM

## 2017-12-19 RX ORDER — KETOROLAC TROMETHAMINE 30 MG/ML
30 INJECTION, SOLUTION INTRAMUSCULAR; INTRAVENOUS ONCE
Status: COMPLETED | OUTPATIENT
Start: 2017-12-19 | End: 2017-12-19

## 2017-12-19 RX ORDER — LABETALOL HYDROCHLORIDE 5 MG/ML
5 INJECTION, SOLUTION INTRAVENOUS
Status: DISCONTINUED | OUTPATIENT
Start: 2017-12-19 | End: 2017-12-19 | Stop reason: HOSPADM

## 2017-12-19 RX ORDER — DILTIAZEM HYDROCHLORIDE 180 MG/1
180 CAPSULE, COATED, EXTENDED RELEASE ORAL NIGHTLY
Status: DISCONTINUED | OUTPATIENT
Start: 2017-12-19 | End: 2017-12-21 | Stop reason: HOSPADM

## 2017-12-19 RX ADMIN — WATER 2 G: 1 INJECTION INTRAMUSCULAR; INTRAVENOUS; SUBCUTANEOUS at 21:43

## 2017-12-19 RX ADMIN — VECURONIUM BROMIDE 9 MG: 1 INJECTION, POWDER, LYOPHILIZED, FOR SOLUTION INTRAVENOUS at 12:35

## 2017-12-19 RX ADMIN — EPHEDRINE SULFATE 5 MG: 50 INJECTION INTRAMUSCULAR; INTRAVENOUS; SUBCUTANEOUS at 13:39

## 2017-12-19 RX ADMIN — PHENYLEPHRINE HYDROCHLORIDE 100 MCG: 10 INJECTION INTRAVENOUS at 13:28

## 2017-12-19 RX ADMIN — HYDROCHLOROTHIAZIDE 25 MG: 25 TABLET ORAL at 21:42

## 2017-12-19 RX ADMIN — KETOROLAC TROMETHAMINE 30 MG: 30 INJECTION, SOLUTION INTRAMUSCULAR; INTRAVENOUS at 15:35

## 2017-12-19 RX ADMIN — PROMETHAZINE HYDROCHLORIDE 12.5 MG: 25 INJECTION INTRAMUSCULAR; INTRAVENOUS at 15:40

## 2017-12-19 RX ADMIN — VECURONIUM BROMIDE 3 MG: 1 INJECTION, POWDER, LYOPHILIZED, FOR SOLUTION INTRAVENOUS at 13:55

## 2017-12-19 RX ADMIN — ONDANSETRON 4 MG: 2 INJECTION INTRAMUSCULAR; INTRAVENOUS at 14:58

## 2017-12-19 RX ADMIN — SODIUM CHLORIDE, SODIUM GLUCONATE, SODIUM ACETATE, POTASSIUM CHLORIDE, AND MAGNESIUM CHLORIDE: 526; 502; 368; 37; 30 INJECTION, SOLUTION INTRAVENOUS at 14:13

## 2017-12-19 RX ADMIN — HYDROMORPHONE HYDROCHLORIDE 0.5 MG: 1 INJECTION, SOLUTION INTRAMUSCULAR; INTRAVENOUS; SUBCUTANEOUS at 15:22

## 2017-12-19 RX ADMIN — FENTANYL CITRATE 100 MCG: 50 INJECTION, SOLUTION INTRAMUSCULAR; INTRAVENOUS at 12:30

## 2017-12-19 RX ADMIN — EPHEDRINE SULFATE 5 MG: 50 INJECTION INTRAMUSCULAR; INTRAVENOUS; SUBCUTANEOUS at 14:15

## 2017-12-19 RX ADMIN — OXYCODONE HYDROCHLORIDE AND ACETAMINOPHEN 1000 MG: 500 TABLET ORAL at 21:42

## 2017-12-19 RX ADMIN — DILTIAZEM HYDROCHLORIDE 180 MG: 180 CAPSULE, COATED, EXTENDED RELEASE ORAL at 21:42

## 2017-12-19 RX ADMIN — PROPOFOL 120 MG: 10 INJECTION, EMULSION INTRAVENOUS at 12:34

## 2017-12-19 RX ADMIN — SENNOSIDES AND DOCUSATE SODIUM 2 TABLET: 8.6; 5 TABLET ORAL at 21:42

## 2017-12-19 RX ADMIN — HYDROMORPHONE HYDROCHLORIDE 0.5 MG: 1 INJECTION, SOLUTION INTRAMUSCULAR; INTRAVENOUS; SUBCUTANEOUS at 18:00

## 2017-12-19 RX ADMIN — FENTANYL CITRATE 50 MCG: 50 INJECTION, SOLUTION INTRAMUSCULAR; INTRAVENOUS at 13:08

## 2017-12-19 RX ADMIN — NEOSTIGMINE METHYLSULFATE 2 MG: 1 INJECTION, SOLUTION INTRAVENOUS at 14:56

## 2017-12-19 RX ADMIN — SODIUM CHLORIDE 100 ML/HR: 9 INJECTION, SOLUTION INTRAVENOUS at 10:18

## 2017-12-19 RX ADMIN — LIDOCAINE HYDROCHLORIDE 100 MG: 20 INJECTION, SOLUTION INFILTRATION; PERINEURAL at 12:34

## 2017-12-19 RX ADMIN — ALBUTEROL SULFATE 2.5 MG: 2.5 SOLUTION RESPIRATORY (INHALATION) at 21:45

## 2017-12-19 RX ADMIN — PHENYLEPHRINE HYDROCHLORIDE 100 MCG: 10 INJECTION INTRAVENOUS at 14:39

## 2017-12-19 RX ADMIN — HYDROMORPHONE HYDROCHLORIDE 0.5 MG: 1 INJECTION, SOLUTION INTRAMUSCULAR; INTRAVENOUS; SUBCUTANEOUS at 15:32

## 2017-12-19 RX ADMIN — EPHEDRINE SULFATE 10 MG: 50 INJECTION INTRAMUSCULAR; INTRAVENOUS; SUBCUTANEOUS at 13:50

## 2017-12-19 RX ADMIN — Medication: at 15:50

## 2017-12-19 RX ADMIN — WATER 2 G: 1 INJECTION INTRAMUSCULAR; INTRAVENOUS; SUBCUTANEOUS at 12:43

## 2017-12-19 RX ADMIN — MIDAZOLAM 2 MG: 1 INJECTION INTRAMUSCULAR; INTRAVENOUS at 12:28

## 2017-12-19 RX ADMIN — GLYCOPYRROLATE 0.4 MG: 0.2 INJECTION, SOLUTION INTRAMUSCULAR; INTRAVENOUS at 14:56

## 2017-12-19 RX ADMIN — SODIUM CHLORIDE 30 ML/HR: 900 INJECTION, SOLUTION INTRAVENOUS at 22:05

## 2017-12-19 RX ADMIN — FAMOTIDINE 40 MG: 40 TABLET ORAL at 22:04

## 2017-12-19 NOTE — PLAN OF CARE
Problem: Patient Care Overview (Adult)  Goal: Plan of Care Review  Outcome: Ongoing (interventions implemented as appropriate)    12/19/17 5543   Coping/Psychosocial Response Interventions   Plan Of Care Reviewed With patient   Patient Care Overview   Progress improving   Outcome Evaluation   Outcome Summary/Follow up Plan vss. no distress noted. denies acute pain at this time unless he repositions or coughs. rests comfortably when not disturbed. heart hugger in use. wound vac in use. estefany intact & compressed.        Goal: Adult Individualization and Mutuality  Outcome: Ongoing (interventions implemented as appropriate)    Problem: Perioperative Period (Adult)  Goal: Signs and Symptoms of Listed Potential Problems Will be Absent or Manageable (Perioperative Period)  Outcome: Ongoing (interventions implemented as appropriate)

## 2017-12-19 NOTE — H&P
Paddy Brantley is a 57 y.o. male is here today in follow up for sternal dehiscence, CT results, and to discuss surgical options.        Chief Complaint   Patient presents with   • Carotid Artery Disease   • CT Results   With popping and clicking of sternum with movement.  PCP Enochs  VIVIENNE Resendez      Carotid Artery Disease   Associated symptoms include chest pain. Pertinent negatives include no abdominal pain, anorexia, chills, coughing, fever, numbness or weakness.      Mr Brantley is gentleman  known CAD with PCI 2012, DM, DLP.   He underwent CABG X 4  11/22/16 with unremarkable recovery and dc home on POD 3. He returned to hospital with SOA, Lg Left Hemothorax was drained with CT placement approx 4L total. Remained stable, mild anemia. Plavix was stopped as he was allergic.  Returns today as he called requesting to be seen concerning sternal popping and movement with discomfort.  Has increased over last few weeks.  He cancelled scheduled procedure in November.    Recent history:  11/2016: PFT: FEV1 31% Severe Restrictive lung disease.  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       The following portions of the patient's history were reviewed and updated as appropriate: allergies, current medications, past family history, past medical history, past social history, past surgical history and problem list.           Allergies   Allergen Reactions   • Amiodarone Anaphylaxis   • Contrast Dye Anaphylaxis   • Iodine Anaphylaxis   • Proton Pump Inhibitors Shortness Of Breath   • Shellfish-Derived Products Anaphylaxis   • Dexamethasone Other (See Comments)   •  "Livalo [Pitavastatin] Swelling   • Multivitamins         Oyster/calcium carbonate   • Plavix [Clopidogrel Bisulfate] Other (See Comments)       bleeding   • Statins Swelling   • Tetracyclines & Related Other (See Comments)       \"skin falls off\"   • Penicillins Rash         Current Outpatient Prescriptions:   •  ascorbic acid (VITAMIN C) 1000 MG tablet, Take 1,000 mg by mouth Daily., Disp: , Rfl:   •  aspirin 325 MG tablet, Take 325 mg by mouth Daily., Disp: , Rfl:   •  Biotin 5000 MCG tablet, Take 10,000 mcg by mouth Daily., Disp: , Rfl:   •  Coenzyme Q10 (COQ10) 100 MG capsule, Take 1 tablet by mouth Daily., Disp: , Rfl:   •  diphenhydrAMINE (BENADRYL) 25 mg capsule, Take 25 mg by mouth Every 6 (Six) Hours As Needed for Itching., Disp: , Rfl:   •  hydrochlorothiazide (HYDRODIURIL) 25 MG tablet, Take 25 mg by mouth Daily., Disp: , Rfl:   •  HYDROcodone-acetaminophen (NORCO)  MG per tablet, Take 1 tablet by mouth Every 4 (Four) Hours As Needed for moderate pain (4-6)., Disp: 40 tablet, Rfl: 0  •  insulin NPH (HUMULIN N) 100 UNIT/ML injection, Inject 42 Units under the skin 2 (Two) Times a Day., Disp: , Rfl:   •  NOVOLIN R 100 UNIT/ML injection, Inject 22 Units as directed 2 (Two) Times a Day., Disp: , Rfl:   •  raNITIdine (ZANTAC) 300 MG tablet, Take 300 mg by mouth 2 (Two) Times a Day., Disp: , Rfl:   •  diltiaZEM CD (CARTIA XT) 180 MG 24 hr capsule, Take 180 mg by mouth Every Night., Disp: , Rfl:      Review of Systems   Constitution: Negative for chills, decreased appetite, fever, weakness and malaise/fatigue.   HENT: Negative for hearing loss, hoarse voice and nosebleeds.    Eyes: Negative for visual disturbance.   Cardiovascular: Positive for chest pain. Negative for claudication, cyanosis, dyspnea on exertion and leg swelling.        Sternum popping clicking with movement worse since recent fall    Respiratory: Negative for cough, hemoptysis and shortness of breath.    Hematologic/Lymphatic: Does not " bruise/bleed easily.   Skin: Positive for poor wound healing. Negative for color change and dry skin.   Musculoskeletal: Positive for falls (soft tissue and skin abrasion to RLE ). Negative for muscle cramps and muscle weakness.        Sternal bulge    Gastrointestinal: Negative for abdominal pain, anorexia, hematemesis and melena.   Genitourinary: Negative for hematuria.   Neurological: Negative for difficulty with concentration, dizziness, numbness and paresthesias.   Psychiatric/Behavioral: Negative for altered mental status.   Allergic/Immunologic: Negative for hives.            Objective       Physical Exam   Constitutional: He is oriented to person, place, and time. He appears well-nourished.   HENT:   Head: Normocephalic.   Mouth/Throat: Oropharynx is clear and moist.   Eyes: Conjunctivae are normal. Pupils are equal, round, and reactive to light.   Neck: No JVD present.   Cardiovascular: Normal rate, regular rhythm, normal heart sounds and intact distal pulses.    Sternum unstable to moderate pressure with clicking      Pulmonary/Chest: Effort normal. No stridor.   Exp rhonchi   Sternum tender   Abdominal: Soft. Bowel sounds are normal. There is no tenderness.   Musculoskeletal: He exhibits no edema.   Neurological: He is alert and oriented to person, place, and time.   Skin: Skin is warm and dry. No erythema.   RLE multiple abrasions, cuts and bruising from recent fall    Psychiatric: His behavior is normal. Judgment normal.   Nursing note and vitals reviewed.            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       1. 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  Studies demonstrate sternal instability and fracture.  Requires  surgical debridement and repair and fixation with orthopedic devices (plates and screws).    Paddy Brantley understands risks, including but not limited to: pain, infection, bleeding, nerve or blood vessel injury, need for other operative proceddures.  .  Benefits: relief of symptoms.  Options: medical therapy, alternative imaging discussed. Paddy Brantley understands and wishes to proceed with plan.  Sternal debridement with fixation of sternal fracture and related procedures scheduled for 12/19/2017.   Thank you for the opportunity to participate in this patient's care.

## 2017-12-19 NOTE — ANESTHESIA PROCEDURE NOTES
Airway  Urgency: elective    Airway not difficult    General Information and Staff    Patient location during procedure: OR  CRNA: MIGUELINA CROCKETT    Indications and Patient Condition    Preoxygenated: yes  Mask difficulty assessment: 2 - vent by mask + OA or adjuvant +/- NMBA    Final Airway Details  Final airway type: endotracheal airway      Successful airway: ETT  Cuffed: yes   Successful intubation technique: direct laryngoscopy  Facilitating devices/methods: intubating stylet  Endotracheal tube insertion site: oral  Blade: Jd  Blade size: #3  ETT size: 8.0 mm  Cormack-Lehane Classification: grade IIa - partial view of glottis  Placement verified by: chest auscultation and capnometry   Cuff volume (mL): 10  Measured from: lips  ETT to lips (cm): 21  Number of attempts at approach: 1    Additional Comments  Lips and teeth in preanesthetic condition.

## 2017-12-19 NOTE — OP NOTE
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 general endotracheal anesthesia was induced. patient prepped draped sterile fashion. Sternal incision reopened with 15blade.  Complete sternal nonunion.  6 sternal wires removed. Each hemisternum dissected 4cm above and below.  Sternal edges debrided with currette and justin.  Skin subcutaneous tissue and fascia excisional debridement performed. 15 Georgian Russel drain was placed in the wound bed below sternum to bulb suction. Sternal wire placed at top and bottom of sternum.  Sternal clamps used to approximate sternum.  Titanium sternal plates and screws used for sternal reconstruction. Sternal clamps removed.  incision closed in layers of 2-0 pds, 2-0 pds, staples in skin.  prevena dressing placed for negative pressure wound therapy.  patient tolerated procedure well and transferred to PACU in stable condition.          This document has been electronically signed by Homero Sutton MD on December 19, 2017 3:17 PM

## 2017-12-19 NOTE — ANESTHESIA POSTPROCEDURE EVALUATION
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 [M96.89]) MD Tae Marroquin MD          Anesthesia Type: general  Last vitals  BP   149/65 (12/19/17 1013)   Temp   97.5 °F (36.4 °C) (12/19/17 1013)   Pulse   67 (12/19/17 1013)   Resp   18 (12/19/17 1013)     SpO2   96 % (12/19/17 1013)     Post Anesthesia Care and Evaluation    Patient location during evaluation: PACU  Patient participation: complete - patient participated  Level of consciousness: awake and alert  Pain management: adequate  Airway patency: patent  Anesthetic complications: No anesthetic complications  PONV Status: none  Cardiovascular status: acceptable  Respiratory status: acceptable  Hydration status: acceptable

## 2017-12-19 NOTE — ANESTHESIA PREPROCEDURE EVALUATION
Anesthesia Evaluation     Patient summary reviewed and Nursing notes reviewed   NPO Solid Status: > 8 hours  NPO Liquid Status: > 8 hours     Airway   Mallampati: III  TM distance: >3 FB  Neck ROM: full  possible difficult intubation  Dental    (+) poor dentation    Pulmonary - normal exam    breath sounds clear to auscultation  (+) pneumonia ( 2/28/17 left sided pleural effusion. 10/12/17 Linear atelectasis left lung base. Lungs clear on exam 12/19/17.) resolved , a smoker Former, shortness of breath,     ROS comment: Left lower lobe/resolved.  Cardiovascular - normal exam    ECG reviewed  Rhythm: regular  Rate: normal    (+) hypertension, CAD, CABG, angina, PVD,     ROS comment: EKG:NSR EF 55-60%.    Neuro/Psych- negative ROS  GI/Hepatic/Renal/Endo    (+) obesity,  GERD well controlled, diabetes mellitus type 2 poorly controlled using insulin,     Musculoskeletal (-) negative ROS        ROS comment: Previous lumbar discectomy.  Abdominal   (+) obese,    Substance History - negative use     OB/GYN negative ob/gyn ROS         Other - negative ROS                                       Anesthesia Plan    ASA 3     general     intravenous induction   Anesthetic plan and risks discussed with patient.

## 2017-12-20 LAB
ANION GAP SERPL CALCULATED.3IONS-SCNC: 10 MMOL/L (ref 5–15)
BASOPHILS # BLD AUTO: 0.01 10*3/MM3 (ref 0–0.2)
BASOPHILS NFR BLD AUTO: 0.1 % (ref 0–2)
BUN BLD-MCNC: 29 MG/DL (ref 7–21)
BUN/CREAT SERPL: 22.3 (ref 7–25)
CALCIUM SPEC-SCNC: 8.1 MG/DL (ref 8.4–10.2)
CHLORIDE SERPL-SCNC: 95 MMOL/L (ref 95–110)
CO2 SERPL-SCNC: 30 MMOL/L (ref 22–31)
CREAT BLD-MCNC: 1.3 MG/DL (ref 0.7–1.3)
DEPRECATED RDW RBC AUTO: 43.5 FL (ref 35.1–43.9)
EOSINOPHIL # BLD AUTO: 0.19 10*3/MM3 (ref 0–0.7)
EOSINOPHIL NFR BLD AUTO: 2.1 % (ref 0–7)
ERYTHROCYTE [DISTWIDTH] IN BLOOD BY AUTOMATED COUNT: 13.2 % (ref 11.5–14.5)
GFR SERPL CREATININE-BSD FRML MDRD: 57 ML/MIN/1.73 (ref 60–130)
GLUCOSE BLD-MCNC: 143 MG/DL (ref 60–100)
GLUCOSE BLDC GLUCOMTR-MCNC: 139 MG/DL (ref 70–130)
GLUCOSE BLDC GLUCOMTR-MCNC: 173 MG/DL (ref 70–130)
GLUCOSE BLDC GLUCOMTR-MCNC: 179 MG/DL (ref 70–130)
GLUCOSE BLDC GLUCOMTR-MCNC: 180 MG/DL (ref 70–130)
HCT VFR BLD AUTO: 43.3 % (ref 39–49)
HGB BLD-MCNC: 14.2 G/DL (ref 13.7–17.3)
IMM GRANULOCYTES # BLD: 0.02 10*3/MM3 (ref 0–0.02)
IMM GRANULOCYTES NFR BLD: 0.2 % (ref 0–0.5)
LYMPHOCYTES # BLD AUTO: 0.91 10*3/MM3 (ref 0.6–4.2)
LYMPHOCYTES NFR BLD AUTO: 9.9 % (ref 10–50)
MCH RBC QN AUTO: 29.5 PG (ref 26.5–34)
MCHC RBC AUTO-ENTMCNC: 32.8 G/DL (ref 31.5–36.3)
MCV RBC AUTO: 89.8 FL (ref 80–98)
MONOCYTES # BLD AUTO: 0.9 10*3/MM3 (ref 0–0.9)
MONOCYTES NFR BLD AUTO: 9.8 % (ref 0–12)
NEUTROPHILS # BLD AUTO: 7.13 10*3/MM3 (ref 2–8.6)
NEUTROPHILS NFR BLD AUTO: 77.9 % (ref 37–80)
PLATELET # BLD AUTO: 143 10*3/MM3 (ref 150–450)
PMV BLD AUTO: 10.5 FL (ref 8–12)
POTASSIUM BLD-SCNC: 4.3 MMOL/L (ref 3.5–5.1)
RBC # BLD AUTO: 4.82 10*6/MM3 (ref 4.37–5.74)
SODIUM BLD-SCNC: 135 MMOL/L (ref 137–145)
WBC NRBC COR # BLD: 9.16 10*3/MM3 (ref 3.2–9.8)

## 2017-12-20 PROCEDURE — 63710000001 INSULIN ASPART PER 5 UNITS: Performed by: NURSE PRACTITIONER

## 2017-12-20 PROCEDURE — 94799 UNLISTED PULMONARY SVC/PX: CPT

## 2017-12-20 PROCEDURE — 25010000002 ONDANSETRON PER 1 MG: Performed by: THORACIC SURGERY (CARDIOTHORACIC VASCULAR SURGERY)

## 2017-12-20 PROCEDURE — 25010000003 MORPHINE PER 10 MG: Performed by: THORACIC SURGERY (CARDIOTHORACIC VASCULAR SURGERY)

## 2017-12-20 PROCEDURE — 25010000002 FUROSEMIDE PER 20 MG: Performed by: NURSE PRACTITIONER

## 2017-12-20 PROCEDURE — 80048 BASIC METABOLIC PNL TOTAL CA: CPT | Performed by: THORACIC SURGERY (CARDIOTHORACIC VASCULAR SURGERY)

## 2017-12-20 PROCEDURE — 94760 N-INVAS EAR/PLS OXIMETRY 1: CPT

## 2017-12-20 PROCEDURE — 99024 POSTOP FOLLOW-UP VISIT: CPT | Performed by: NURSE PRACTITIONER

## 2017-12-20 PROCEDURE — 25010000003 CEFAZOLIN PER 500 MG: Performed by: THORACIC SURGERY (CARDIOTHORACIC VASCULAR SURGERY)

## 2017-12-20 PROCEDURE — 85025 COMPLETE CBC W/AUTO DIFF WBC: CPT | Performed by: THORACIC SURGERY (CARDIOTHORACIC VASCULAR SURGERY)

## 2017-12-20 PROCEDURE — 82962 GLUCOSE BLOOD TEST: CPT

## 2017-12-20 RX ORDER — NICOTINE POLACRILEX 4 MG
15 LOZENGE BUCCAL
Status: DISCONTINUED | OUTPATIENT
Start: 2017-12-20 | End: 2017-12-21 | Stop reason: HOSPADM

## 2017-12-20 RX ORDER — DEXTROSE MONOHYDRATE 25 G/50ML
25 INJECTION, SOLUTION INTRAVENOUS
Status: DISCONTINUED | OUTPATIENT
Start: 2017-12-20 | End: 2017-12-21 | Stop reason: HOSPADM

## 2017-12-20 RX ORDER — FUROSEMIDE 10 MG/ML
10 INJECTION INTRAMUSCULAR; INTRAVENOUS ONCE
Status: COMPLETED | OUTPATIENT
Start: 2017-12-20 | End: 2017-12-20

## 2017-12-20 RX ORDER — HYDROCODONE BITARTRATE AND ACETAMINOPHEN 10; 325 MG/1; MG/1
1 TABLET ORAL EVERY 4 HOURS PRN
Status: DISCONTINUED | OUTPATIENT
Start: 2017-12-20 | End: 2017-12-21 | Stop reason: HOSPADM

## 2017-12-20 RX ORDER — INSULIN ASPART 100 [IU]/ML
64 INJECTION, SUSPENSION SUBCUTANEOUS 2 TIMES DAILY WITH MEALS
Status: DISCONTINUED | OUTPATIENT
Start: 2017-12-20 | End: 2017-12-21 | Stop reason: HOSPADM

## 2017-12-20 RX ORDER — HYDROCODONE BITARTRATE AND ACETAMINOPHEN 5; 325 MG/1; MG/1
1 TABLET ORAL EVERY 4 HOURS PRN
Status: DISCONTINUED | OUTPATIENT
Start: 2017-12-20 | End: 2017-12-21 | Stop reason: HOSPADM

## 2017-12-20 RX ADMIN — DILTIAZEM HYDROCHLORIDE 180 MG: 180 CAPSULE, COATED, EXTENDED RELEASE ORAL at 21:06

## 2017-12-20 RX ADMIN — HYDROCODONE BITARTRATE AND ACETAMINOPHEN 1 TABLET: 10; 325 TABLET ORAL at 10:33

## 2017-12-20 RX ADMIN — ALBUTEROL SULFATE 2.5 MG: 2.5 SOLUTION RESPIRATORY (INHALATION) at 06:47

## 2017-12-20 RX ADMIN — ALBUTEROL SULFATE 2.5 MG: 2.5 SOLUTION RESPIRATORY (INHALATION) at 14:58

## 2017-12-20 RX ADMIN — Medication: at 03:19

## 2017-12-20 RX ADMIN — KETOROLAC TROMETHAMINE 10 MG: 10 TABLET, FILM COATED ORAL at 23:36

## 2017-12-20 RX ADMIN — HYDROCHLOROTHIAZIDE 25 MG: 25 TABLET ORAL at 08:50

## 2017-12-20 RX ADMIN — ONDANSETRON 4 MG: 2 INJECTION INTRAMUSCULAR; INTRAVENOUS at 13:08

## 2017-12-20 RX ADMIN — SENNOSIDES AND DOCUSATE SODIUM 2 TABLET: 8.6; 5 TABLET ORAL at 21:06

## 2017-12-20 RX ADMIN — INSULIN ASPART 4 UNITS: 100 INJECTION, SOLUTION INTRAVENOUS; SUBCUTANEOUS at 12:00

## 2017-12-20 RX ADMIN — HYDROCODONE BITARTRATE AND ACETAMINOPHEN 1 TABLET: 10; 325 TABLET ORAL at 21:12

## 2017-12-20 RX ADMIN — KETOROLAC TROMETHAMINE 10 MG: 10 TABLET, FILM COATED ORAL at 00:44

## 2017-12-20 RX ADMIN — OXYCODONE HYDROCHLORIDE AND ACETAMINOPHEN 1000 MG: 500 TABLET ORAL at 21:06

## 2017-12-20 RX ADMIN — SODIUM CHLORIDE 30 ML/HR: 900 INJECTION, SOLUTION INTRAVENOUS at 08:59

## 2017-12-20 RX ADMIN — HYDROCODONE BITARTRATE AND ACETAMINOPHEN 1 TABLET: 10; 325 TABLET ORAL at 14:44

## 2017-12-20 RX ADMIN — KETOROLAC TROMETHAMINE 10 MG: 10 TABLET, FILM COATED ORAL at 08:50

## 2017-12-20 RX ADMIN — KETOROLAC TROMETHAMINE 10 MG: 10 TABLET, FILM COATED ORAL at 16:19

## 2017-12-20 RX ADMIN — WATER 2 G: 1 INJECTION INTRAMUSCULAR; INTRAVENOUS; SUBCUTANEOUS at 06:21

## 2017-12-20 RX ADMIN — INSULIN ASPART 4 UNITS: 100 INJECTION, SOLUTION INTRAVENOUS; SUBCUTANEOUS at 21:07

## 2017-12-20 RX ADMIN — OXYCODONE HYDROCHLORIDE AND ACETAMINOPHEN 1000 MG: 500 TABLET ORAL at 08:50

## 2017-12-20 RX ADMIN — FUROSEMIDE 10 MG: 10 INJECTION, SOLUTION INTRAVENOUS at 10:34

## 2017-12-20 RX ADMIN — INSULIN ASPART 64 UNITS: 100 INJECTION, SUSPENSION SUBCUTANEOUS at 12:00

## 2017-12-20 RX ADMIN — ASPIRIN 325 MG: 325 TABLET, COATED ORAL at 08:50

## 2017-12-20 NOTE — PLAN OF CARE
Problem: Patient Care Overview (Adult)  Goal: Plan of Care Review  Outcome: Ongoing (interventions implemented as appropriate)   12/19/17 1745 12/20/17 0305   Coping/Psychosocial Response Interventions   Plan Of Care Reviewed With --  patient   Patient Care Overview   Progress --  no change   Outcome Evaluation   Outcome Summary/Follow up Plan vss. no distress noted. denies acute pain at this time unless he repositions or coughs. rests comfortably when not disturbed. heart hugger in use. wound vac in use. estefany intact & compressed.  --      Goal: Adult Individualization and Mutuality  Outcome: Ongoing (interventions implemented as appropriate)    Goal: Discharge Needs Assessment  Outcome: Ongoing (interventions implemented as appropriate)      Problem: Perioperative Period (Adult)  Goal: Signs and Symptoms of Listed Potential Problems Will be Absent or Manageable (Perioperative Period)  Outcome: Ongoing (interventions implemented as appropriate)

## 2017-12-20 NOTE — PROGRESS NOTES
"  Cardiothoracic - Vascular Surgery Daily Note        LOS: 1 day   Patient Care Team:  Nick Modi MD as PCP - General     POD1  Sternal revision and repair (synthes sternal plates 1 star, 3 H, 22 16mm screws)  Sternal debridement (bone)  Wound debridement (skin, subq, fascia 25cm2)  Negative pressure wound therapy (prevena)    Chief Complaint: \"Feels swollen\"       Subjective     The following portions of the patient's history were reviewed and updated as appropriate: allergies, current medications, past family history, past medical history, past social history, past surgical history and problem list.     Subjective:  Symptoms:  Stable.  He reports chest pain (surgical soreness).    Diet:  Adequate intake.    Activity level: Normal.    Pain:  He complains of pain that is mild.  Pain is well controlled and requiring pain medication.          Objective     Vital Signs  Temp:  [96.6 °F (35.9 °C)-99.2 °F (37.3 °C)] 98 °F (36.7 °C)  Heart Rate:  [67-96] 93  Resp:  [16-20] 20  BP: (109-156)/(53-72) 120/58  Body mass index is 35 kg/(m^2).    Intake/Output Summary (Last 24 hours) at 12/20/17 1001  Last data filed at 12/20/17 0859   Gross per 24 hour   Intake          5032.18 ml   Output             1410 ml   Net          3622.18 ml     I/O this shift:  In: 1000 [I.V.:1000]  Out: -     Wt Readings from Last 3 Encounters:   12/20/17 104 kg (230 lb 3.2 oz)   10/26/17 103 kg (226 lb)   10/26/17 103 kg (226 lb 4 oz)     JAYDON 110cc Sanginous    Physical Exam   Objective:  General Appearance:  Comfortable and well-appearing.    Vital signs: (most recent): Blood pressure 120/58, pulse 93, temperature 98 °F (36.7 °C), resp. rate 20, height 172.7 cm (68\"), weight 104 kg (230 lb 3.2 oz), SpO2 93 %.  Vital signs are normal.  No fever.    Output: Producing urine and no stool output.    HEENT: Normal HEENT exam.    Lungs:  Normal respiratory rate and normal effort.  Breath sounds clear to auscultation.    Heart: Normal rate.  " Regular rhythm.    Chest: (JAYDON Sanginous)  Abdomen: Abdomen is soft.  Hypoactive bowel sounds.     Extremities: Normal range of motion.    Neurological: Patient is alert and oriented to person, place and time.    Pupils:  Pupils are equal, round, and reactive to light.    Skin:  Warm and dry.  (M/S INC: Provena intact)              Results Review:    Lab Results   Component Value Date    WBC 9.16 12/20/2017    HGB 14.2 12/20/2017    HCT 43.3 12/20/2017    MCV 89.8 12/20/2017     (L) 12/20/2017       Estimated Creatinine Clearance: 71.5 mL/min (by C-G formula based on Cr of 1.3).          Lab Results   Component Value Date    GLUCOSE 143 (H) 12/20/2017    BUN 29 (H) 12/20/2017    CREATININE 1.30 12/20/2017    EGFRIFNONA 57 (L) 12/20/2017    BCR 22.3 12/20/2017    K 4.3 12/20/2017    CO2 30.0 12/20/2017    CALCIUM 8.1 (L) 12/20/2017    ALBUMIN 4.20 10/26/2017    LABIL2 1.6 10/26/2017    AST 26 10/26/2017    ALT 50 10/26/2017             Imaging Results (last 24 hours)     Procedure Component Value Units Date/Time    XR Chest 1 View [[id number]] Collected:  12/19/17 1522     Updated:  12/19/17 1600    Narrative:         PORTABLE CHEST    HISTORY: Sternal revision. Postoperative study.    Portable AP film of the chest was obtained at 3:23 PM.  COMPARISON:  October 12, 2017    Revision of the sternotomy with internal fixation plate and screw  devices now in place.  Midline surgical skin staples.  Midline drain.  Minimal linear atelectasis lung bases.  The heart is not enlarged.  The pulmonary vasculature is not increased.  No pleural effusion.  No pneumothorax.  No acute osseous abnormality.      Impression:       CONCLUSION:  Revision of sternotomy.  Minimal linear atelectasis lung bases.    [id number]    Electronically signed by:  Tacho Carver MD  12/19/2017 3:59 PM  CST Workstation: [employer]                                albuterol 2.5 mg Nebulization Q8H - RT   aspirin 325 mg Oral Daily   diltiaZEM  mg  Oral Nightly   famotidine 40 mg Oral BID   furosemide 10 mg Intravenous Once   hydrochlorothiazide 25 mg Oral Daily   insulin aspart 0-24 Units Subcutaneous 4x Daily AC & at Bedtime   insulin aspart prot-insulin aspart 64 Units Subcutaneous BID With Meals   ketorolac 10 mg Oral Q8H   sennosides-docusate sodium 2 tablet Oral Nightly   ascorbic acid 1,000 mg Oral BID       sodium chloride 30 mL/hr Last Rate: 30 mL/hr (12/20/17 0859)             ASSESSMENT/PLAN     Active Problems:    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   (Satisfactory Post Op Sternal Revision: Progressing Well.     Resp: Incentive. Lasix. Maintain JAYDON.    Pain: DC PCA, add NORCO PRN, Cont. Toradol    Rehab: Ambulate in halls    DM: add Sliding Scale Coverage, add 70/30 home dose).     Plan:   Encourage ambulation and out of bed and up to chair.  Regular diet.  Add diuretic.   (Stable. Continue 3W Care. Possible DC in AM).                 This document has been electronically signed by GISSELLE George on December 20, 2017 10:01 AM

## 2017-12-21 VITALS
DIASTOLIC BLOOD PRESSURE: 60 MMHG | RESPIRATION RATE: 20 BRPM | OXYGEN SATURATION: 93 % | WEIGHT: 230.9 LBS | BODY MASS INDEX: 34.99 KG/M2 | HEIGHT: 68 IN | TEMPERATURE: 97.1 F | HEART RATE: 58 BPM | SYSTOLIC BLOOD PRESSURE: 122 MMHG

## 2017-12-21 LAB
GLUCOSE BLDC GLUCOMTR-MCNC: 129 MG/DL (ref 70–130)
GLUCOSE BLDC GLUCOMTR-MCNC: 216 MG/DL (ref 70–130)
LAB AP CASE REPORT: NORMAL
Lab: NORMAL
PATH REPORT.FINAL DX SPEC: NORMAL
PATH REPORT.GROSS SPEC: NORMAL

## 2017-12-21 PROCEDURE — 99024 POSTOP FOLLOW-UP VISIT: CPT | Performed by: NURSE PRACTITIONER

## 2017-12-21 PROCEDURE — 82962 GLUCOSE BLOOD TEST: CPT

## 2017-12-21 PROCEDURE — 63710000001 INSULIN ASPART PER 5 UNITS: Performed by: NURSE PRACTITIONER

## 2017-12-21 RX ORDER — HYDROCODONE BITARTRATE AND ACETAMINOPHEN 10; 325 MG/1; MG/1
1 TABLET ORAL EVERY 4 HOURS PRN
Qty: 40 TABLET | Refills: 0 | Status: SHIPPED | OUTPATIENT
Start: 2017-12-21 | End: 2019-02-12

## 2017-12-21 RX ORDER — KETOROLAC TROMETHAMINE 10 MG/1
10 TABLET, FILM COATED ORAL EVERY 8 HOURS
Qty: 12 TABLET | Refills: 0 | Status: SHIPPED | OUTPATIENT
Start: 2017-12-21 | End: 2018-01-02

## 2017-12-21 RX ADMIN — INSULIN ASPART 8 UNITS: 100 INJECTION, SOLUTION INTRAVENOUS; SUBCUTANEOUS at 08:45

## 2017-12-21 RX ADMIN — HYDROCODONE BITARTRATE AND ACETAMINOPHEN 1 TABLET: 10; 325 TABLET ORAL at 12:20

## 2017-12-21 RX ADMIN — HYDROCHLOROTHIAZIDE 25 MG: 25 TABLET ORAL at 08:46

## 2017-12-21 RX ADMIN — OXYCODONE HYDROCHLORIDE AND ACETAMINOPHEN 1000 MG: 500 TABLET ORAL at 08:45

## 2017-12-21 RX ADMIN — HYDROCODONE BITARTRATE AND ACETAMINOPHEN 1 TABLET: 10; 325 TABLET ORAL at 05:53

## 2017-12-21 RX ADMIN — INSULIN ASPART 64 UNITS: 100 INJECTION, SUSPENSION SUBCUTANEOUS at 08:45

## 2017-12-21 RX ADMIN — KETOROLAC TROMETHAMINE 10 MG: 10 TABLET, FILM COATED ORAL at 08:45

## 2017-12-21 RX ADMIN — ASPIRIN 325 MG: 325 TABLET, COATED ORAL at 08:45

## 2017-12-21 NOTE — PROGRESS NOTES
"  Cardiothoracic - Vascular Surgery Daily Note        LOS: 2 days   Patient Care Team:  Nick Modi MD as PCP - General     POD2  Sternal revision and repair (synthes sternal plates 1 star, 3 H, 22 16mm screws)  Sternal debridement (bone)  Wound debridement (skin, subq, fascia 25cm2)  Negative pressure wound therapy (prevena)    Chief Complaint: Chest soreness      Subjective     The following portions of the patient's history were reviewed and updated as appropriate: allergies, current medications, past family history, past medical history, past social history, past surgical history and problem list.     Subjective:  Symptoms:  Stable.  He reports chest pain (surgical soreness).    Diet:  Adequate intake.    Activity level: Normal.    Pain:  He complains of pain that is mild.  Pain is well controlled and requiring pain medication.          Objective     Vital Signs  Temp:  [97.2 °F (36.2 °C)-98.3 °F (36.8 °C)] 97.2 °F (36.2 °C)  Heart Rate:  [68-93] 71  Resp:  [18-20] 20  BP: (110-143)/(54-65) 124/58  Body mass index is 35.11 kg/(m^2).    Intake/Output Summary (Last 24 hours) at 12/21/17 1113  Last data filed at 12/21/17 0600   Gross per 24 hour   Intake                0 ml   Output             1790 ml   Net            -1790 ml          Wt Readings from Last 3 Encounters:   12/21/17 105 kg (230 lb 14.4 oz)   10/26/17 103 kg (226 lb)   10/26/17 103 kg (226 lb 4 oz)     JAYDON 190cc Sero-Sanginous (dc'd)    Physical Exam   Objective:  General Appearance:  Comfortable and well-appearing.    Vital signs: (most recent): Blood pressure 124/58, pulse 71, temperature 97.2 °F (36.2 °C), temperature source Temporal Artery , resp. rate 20, height 172.7 cm (68\"), weight 105 kg (230 lb 14.4 oz), SpO2 92 %.  Vital signs are normal.  No fever.    Output: Producing urine and no stool output.    HEENT: Normal HEENT exam.    Lungs:  Normal respiratory rate and normal effort.  Breath sounds clear to auscultation.    Heart: Normal " rate.  Regular rhythm.    Chest: (JAYDON Sero-Sanginous (dc'd))  Abdomen: Abdomen is soft.  Hypoactive bowel sounds.     Extremities: Normal range of motion.    Neurological: Patient is alert and oriented to person, place and time.    Pupils:  Pupils are equal, round, and reactive to light.    Skin:  Warm and dry.  (M/S INC: Provena intact)              Results Review:    Lab Results   Component Value Date    WBC 9.16 12/20/2017    HGB 14.2 12/20/2017    HCT 43.3 12/20/2017    MCV 89.8 12/20/2017     (L) 12/20/2017       Estimated Creatinine Clearance: 71.8 mL/min (by C-G formula based on Cr of 1.3).          Lab Results   Component Value Date    GLUCOSE 143 (H) 12/20/2017    BUN 29 (H) 12/20/2017    CREATININE 1.30 12/20/2017    EGFRIFNONA 57 (L) 12/20/2017    BCR 22.3 12/20/2017    K 4.3 12/20/2017    CO2 30.0 12/20/2017    CALCIUM 8.1 (L) 12/20/2017    ALBUMIN 4.20 10/26/2017    LABIL2 1.6 10/26/2017    AST 26 10/26/2017    ALT 50 10/26/2017             Imaging Results (last 24 hours)     ** No results found for the last 24 hours. 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   (Satisfactory Post Op Sternal Revision: Progressing Well.     Resp: Incentive.     Pain: Controlled on NORCO PRN, Cont. Toradol    Rehab: Ambulate in halls    DM:  Sliding Scale Coverage, add 70/30 home dose).     Plan:    Discharge home.  Encourage ambulation and out of bed and up to chair.  Regular diet.  Add diuretic.   (Stable. DC Home).                 This document has been electronically signed by GISSELLE George on December 21, 2017 11:13 AM

## 2017-12-21 NOTE — DISCHARGE SUMMARY
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          Procedures Performed  Procedure(s):  REPAIR STERNAL DEHISCENCE WITH STERNAL DEBRIDEMENT  Need plate and screws       Discharge Medications   Paddy Brantley Dilan   Home Medication Instructions ARI:[id number]    Printed on:12/21/17 1127   Medication Information                      ascorbic acid (VITAMIN C) 1000 MG tablet  Take 1,000 mg by mouth 2 (Two) Times a Day.             aspirin 325 MG tablet  Take 325 mg by mouth Daily.             Biotin 5000 MCG tablet  Take 5,000 mcg by mouth Daily. 2 tablets             Coenzyme Q10 (COQ10) 100 MG capsule  Take 1 tablet by mouth Daily.             diltiaZEM CD (CARTIA XT) 180 MG 24 hr capsule  Take 180 mg by mouth Every Night.             diphenhydrAMINE (BENADRYL) 25 mg capsule  Take 25 mg by mouth Every 6 (Six) Hours As Needed for Itching.             hydrochlorothiazide (HYDRODIURIL) 25 MG tablet  Take 25 mg by mouth Daily.             HYDROcodone-acetaminophen (NORCO)  MG per tablet  Take 1 tablet by mouth Every 4 (Four) Hours As Needed for Moderate Pain  (Surgical Pain).             insulin NPH (HUMULIN N) 100 UNIT/ML injection  Inject 42 Units under the skin 2 (Two) Times a Day.             ketorolac (TORADOL) 10 MG tablet  Take 1 tablet by mouth Every 8 (Eight) Hours.             NOVOLIN R 100 UNIT/ML injection  Inject 22 Units as directed 2 (Two) Times a Day.             raNITIdine (ZANTAC) 300 MG tablet  Take 300 mg by mouth 2 (Two) Times a Day.               Medical Management: ASA. Reviewed risks, benefits, and habit forming potential and weaning from narcotic medication. Patient understands and wishes to  receive prescription.  Prescription written for Norco #40 for post-surgical pain after Scott placed on file.    Discharge Diet:   Diet Instructions     Diet: Regular       Discharge Diet:  Regular                 Discharge Disposition: Home in stable condition with follow up appointments with CVTS Nurse Practitioner 1 week, Dr. Sutton/Alina 6 weeks, Cardiology/PCP as scheduled.     History of Present Illness/Hospital Course:   Mr Brantley is gentleman  known CAD with PCI 2012, DM, DLP.   He underwent CABG X 4  11/22/16 with unremarkable recovery and dc home on POD 3. He returned to hospital with SOA, Lg Left Hemothorax was drained with CT placement approx 4L total. Remained stable, mild anemia. Plavix was stopped as he was allergic.  Returns today as he called requesting to be seen concerning sternal popping and movement with discomfort.  Has increased over last few weeks.  He cancelled scheduled procedure in November.     Recent history:  11/2016: PFT: FEV1 31% Severe Restrictive lung disease.  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     Paddy Brantley presented for preoperative evaluation demonstrating CT Chest Fracture nonunion midsternal body at 4th sternal wires. With associated symptoms of chest discomfort.  Adequate preop studies were completed and the patient was scheduled electively. Operative day Paddy Brantley admitted through [facility], taken to operating suite and placed under general anesthesia.  Underwent said procedure without complications or difficulty. They were weaned easily from mechanical ventilation and extubated in the recovery  "room placed on oxygen per nasal cannula and further transferred to  for further care and monitoring. Paddy Brantley remained hemodynamically and neurovascularly stable immediately postop.  POD1 they were out of the bed to the chair tolerating breakfast pain controlled with [place]/Toradol.  Paddy Brantley continued with ambulating and progressing well with aggressive pulmonary toilet and weaning oxygen.  Operative incisions clean, dry, intact. Neurovascular and respiratory status remained stable with JAYDON discontinuation and satisfactory removal.  Paddy Brantley is stable today for discharge home with follow up appointments.      Vital Signs  Temp:  [97.2 °F (36.2 °C)-98.3 °F (36.8 °C)] 97.2 °F (36.2 °C)  Heart Rate:  [68-93] 71  Resp:  [18-20] 20  BP: (110-143)/(54-65) 124/58  Last 3 weights    12/19/17  1856 12/20/17  0638 12/21/17  0436   Weight: 101 kg (221 lb 11.2 oz) 104 kg (230 lb 3.2 oz) 105 kg (230 lb 14.4 oz)       Pertinent Test Results:  Lab Results   Component Value Date    WBC 9.16 12/20/2017    HGB 14.2 12/20/2017    HCT 43.3 12/20/2017    MCV 89.8 12/20/2017     (L) 12/20/2017     Lab Results   Component Value Date    GLUCOSE 143 (H) 12/20/2017    BUN 29 (H) 12/20/2017    CREATININE 1.30 12/20/2017    EGFRIFNONA 57 (L) 12/20/2017    BCR 22.3 12/20/2017    K 4.3 12/20/2017    CO2 30.0 12/20/2017    CALCIUM 8.1 (L) 12/20/2017    ALBUMIN 4.20 10/26/2017    LABIL2 1.6 10/26/2017    AST 26 10/26/2017    ALT 50 10/26/2017       Physical Exam:  Physical Exam   General Appearance:  Comfortable and well-appearing.    Vital signs: (most recent): Blood pressure 124/58, pulse 71, temperature 97.2 °F (36.2 °C), temperature source Temporal Artery , resp. rate 20, height 172.7 cm (68\"), weight 105 kg (230 lb 14.4 oz), SpO2 92 %.  Vital signs are normal.  No fever.    Output: Producing urine and no stool output.    HEENT: Normal HEENT exam.    Lungs:  Normal respiratory rate and normal effort.  " Breath sounds clear to auscultation.    Heart: Normal rate.  Regular rhythm.    Chest: (JAYDON Sero-Sanginous (dc'd))  Abdomen: Abdomen is soft.  Hypoactive bowel sounds.     Extremities: Normal range of motion.    Neurological: Patient is alert and oriented to person, place and time.    Pupils:  Pupils are equal, round, and reactive to light.    Skin:  Warm and dry.  (M/S INC: Provena intact)    Additional Instructions for the Follow-ups that You Need to Schedule     Discharge Follow-up with Specialty: Cardiothoracic Surgery; 1 Week    As directed    Specialty:  Cardiothoracic Surgery    Follow Up:  1 Week    Follow Up Details:  12/28/17 2:00 Stefanie [facility]                     Discharge Instructions: Discharge instructions include no heavy lifting anything greater than 10lbs for approximately 6 weeks.  No sex or driving for 3-6 weeks. Patient is to continue with Incentive Spirometry 4 times daily while awake at home as well as any prescribed respiratory treatments. Printed information given to the patient with advancement of activities weekly.  Risks and benefits of narcotic medications and weaning postoperatively have been discussed. Clean operative site with antibacterial soap/water, pat dry. Keep open to air unless draining, then may apply dry dressing.  No ointments or creams unless prescribed by provider. Signs and symptoms of infection including drainage from operative site, redness, swelling, with associated fever and/or chills notify Heart and Vascular center immediately for wound check.  Patient verbalizes understanding of discharge instructions, all questions are answered, follow up appointments have been made, they are discharged home in stable condition.           Follow-up Appointments  Future Appointments  Date Time Provider Department Center   12/28/2017 2:00 PM GISSELLE Obando [facility] None   2/15/2018 3:45 PM MD GALINDO Julian [facility] None       Test Results Pending at  Discharge   Order Current Status    Tissue Pathology Exam - Hardware / Foreign Body, Sternum In process                 This document has been electronically signed by GISSELLE George on December 21, 2017 11:27 AM      Time: Discharge 60 min

## 2017-12-21 NOTE — PLAN OF CARE
Problem: Infection, Risk/Actual (Adult)  Goal: Identify Related Risk Factors and Signs and Symptoms  Outcome: Ongoing (interventions implemented as appropriate)   12/21/17 0412   Infection, Risk/Actual   Infection, Risk/Actual: Related Risk Factors prolonged hospitalization     Goal: Infection Prevention/Resolution  Outcome: Ongoing (interventions implemented as appropriate)      Problem: Fall Risk (Adult)  Goal: Identify Related Risk Factors and Signs and Symptoms  Outcome: Ongoing (interventions implemented as appropriate)    Goal: Absence of Falls  Outcome: Ongoing (interventions implemented as appropriate)

## 2017-12-28 ENCOUNTER — OFFICE VISIT (OUTPATIENT)
Dept: CARDIAC SURGERY | Facility: CLINIC | Age: 57
End: 2017-12-28

## 2017-12-28 VITALS
DIASTOLIC BLOOD PRESSURE: 78 MMHG | HEART RATE: 66 BPM | SYSTOLIC BLOOD PRESSURE: 130 MMHG | HEIGHT: 72 IN | WEIGHT: 224 LBS | TEMPERATURE: 97.4 F | BODY MASS INDEX: 30.34 KG/M2 | OXYGEN SATURATION: 98 %

## 2017-12-28 DIAGNOSIS — Z95.1 S/P CABG (CORONARY ARTERY BYPASS GRAFT): Primary | ICD-10-CM

## 2017-12-28 DIAGNOSIS — Z09 FOLLOW-UP SURGERY CARE: ICD-10-CM

## 2017-12-28 PROCEDURE — 99024 POSTOP FOLLOW-UP VISIT: CPT | Performed by: NURSE PRACTITIONER

## 2017-12-28 NOTE — PROGRESS NOTES
Subjective    Patient ID: Paddy Brantley is a 57 y.o. male is here today in surgical follow up SP Sternal revision and Repair with Sternal Plates.   Chief Complaint   Patient presents with   • Wound Check     f/u 12/19/17 sternal revision/plating   Occasional click.  Chest incision discomfort VAS 4-6  Does not need additional pain pills   PCP Rian Resendez     Wound Check     Carotid Artery Disease   Associated symptoms include chest pain (surgical ). Pertinent negatives include no abdominal pain, anorexia, chills, coughing, fever, numbness or weakness.     Mr Brantley is gentleman  known CAD with PCI 2012, DM, DLP.   He underwent CABG X 4  11/22/16 with unremarkable recovery and dc home on POD 3. He returned to hospital with SOA, Lg Left Hemothorax was drained with CT placement approx 4L total. Remained stable, mild anemia. Plavix was stopped as he was allergic.  Returns today as he called requesting to be seen concerning sternal popping and movement with discomfort.  Has increased over last few weeks.  He elected to proceed with surgical intervention.  He returned to [facility] and underwent said procedure which he tolerated well.  Was discharged home on POD 2.  Returns today in scheduled FU.  Provena wound system completed therapy yesterday and was removed.    Recent history:  11/2016: PFT: FEV1 31% Severe Restrictive lung disease.  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    12/19/17  Sternal revision and Repair with Sternal Plates.     The following portions of the patient's history were reviewed and updated as appropriate: allergies, current  "medications, past family history, past medical history, past social history, past surgical history and problem list.  See HPI     Allergies   Allergen Reactions   • Amiodarone Anaphylaxis   • Contrast Dye Anaphylaxis   • Iodine Anaphylaxis   • Multivitamins Anaphylaxis     Oyster calcium   • Proton Pump Inhibitors Shortness Of Breath   • Shellfish-Derived Products Anaphylaxis   • Dexamethasone Other (See Comments)     hiccups   • Livalo [Pitavastatin] Swelling   • Plavix [Clopidogrel Bisulfate] Other (See Comments)     Excessive bleeding   • Statins Swelling   • Tetracyclines & Related Other (See Comments)     \"skin falls off\"   • Penicillins Rash       Current Outpatient Prescriptions:   •  ascorbic acid (VITAMIN C) 1000 MG tablet, Take 1,000 mg by mouth 2 (Two) Times a Day., Disp: , Rfl:   •  aspirin 325 MG tablet, Take 325 mg by mouth Daily., Disp: , Rfl:   •  Biotin 5000 MCG tablet, Take 5,000 mcg by mouth Daily. 2 tablets, Disp: , Rfl:   •  Coenzyme Q10 (COQ10) 100 MG capsule, Take 1 tablet by mouth Daily., Disp: , Rfl:   •  diltiaZEM CD (CARTIA XT) 180 MG 24 hr capsule, Take 180 mg by mouth Every Night., Disp: , Rfl:   •  diphenhydrAMINE (BENADRYL) 25 mg capsule, Take 25 mg by mouth Every 6 (Six) Hours As Needed for Itching., Disp: , Rfl:   •  hydrochlorothiazide (HYDRODIURIL) 25 MG tablet, Take 25 mg by mouth Daily., Disp: , Rfl:   •  HYDROcodone-acetaminophen (NORCO)  MG per tablet, Take 1 tablet by mouth Every 4 (Four) Hours As Needed for Moderate Pain  (Surgical Pain)., Disp: 40 tablet, Rfl: 0  •  insulin NPH (HUMULIN N) 100 UNIT/ML injection, Inject 42 Units under the skin 2 (Two) Times a Day., Disp: , Rfl:   •  NOVOLIN R 100 UNIT/ML injection, Inject 22 Units as directed 2 (Two) Times a Day., Disp: , Rfl:   •  raNITIdine (ZANTAC) 300 MG tablet, Take 300 mg by mouth 2 (Two) Times a Day., Disp: , Rfl:   •  ketorolac (TORADOL) 10 MG tablet, Take 1 tablet by mouth Every 8 (Eight) Hours., Disp: 12 " tablet, Rfl: 0    Review of Systems   Constitution: Negative for chills, decreased appetite, fever, weakness and malaise/fatigue.   HENT: Negative for hearing loss, hoarse voice and nosebleeds.    Eyes: Negative for visual disturbance.   Cardiovascular: Positive for chest pain (surgical ). Negative for claudication, cyanosis, dyspnea on exertion and leg swelling.        Sternum occasional click  Chest feels tense    Respiratory: Negative for cough, hemoptysis and shortness of breath.    Hematologic/Lymphatic: Does not bruise/bleed easily.   Skin: Negative for color change and dry skin.   Musculoskeletal: Negative for falls, muscle cramps and muscle weakness.        Sternal bulge    Gastrointestinal: Negative for abdominal pain, anorexia, hematemesis and melena.   Genitourinary: Negative for hematuria.   Neurological: Negative for difficulty with concentration, dizziness, numbness and paresthesias.   Psychiatric/Behavioral: Negative for altered mental status.   Allergic/Immunologic: Negative for hives.        Objective   Physical Exam   Constitutional: He is oriented to person, place, and time. He appears well-nourished.   HENT:   Head: Normocephalic.   Mouth/Throat: Oropharynx is clear and moist.   Eyes: Conjunctivae are normal. Pupils are equal, round, and reactive to light.   Neck: No JVD present.   Cardiovascular: Normal rate, regular rhythm, normal heart sounds and intact distal pulses.    Sternum stable with cough      Pulmonary/Chest: Effort normal and breath sounds normal. No stridor. He has no wheezes. He has no rales.   Sternum tender   Abdominal: Soft. Bowel sounds are normal. There is no tenderness.   Musculoskeletal: He exhibits no edema.   Neurological: He is alert and oriented to person, place, and time.   Skin: Skin is warm and dry. No erythema.   Sternal incision clean and dry.  Appx 60% of staples removed.  Tolerated well.  Pt does not tolerated mastisol or steristrips.    Psychiatric: His behavior  is normal. Judgment normal.   Nursing note and vitals reviewed.      Vitals:    12/28/17 1406   BP: 130/78   Pulse: 66   Temp: 97.4 °F (36.3 °C)   SpO2: 98%         Assessment/Plan   Independent Review of Radiographic Studies:    None with today's visit     1. 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  PO Pain  Controlled with usual medications.        3.  Follow-up surgery care  Shower daily  No lifting > 4-6 lbs   Return Tuesday 1/2/18 at 920 for remainder of suture removal.

## 2017-12-28 NOTE — PATIENT INSTRUCTIONS
Shower daily  No lifting > 4-6 lbs   Return Tuesday 1/2/18 at 920 for remainder of suture removal.

## 2018-01-02 ENCOUNTER — HOSPITAL ENCOUNTER (OUTPATIENT)
Dept: GENERAL RADIOLOGY | Facility: HOSPITAL | Age: 58
Discharge: HOME | End: 2018-01-02
Admitting: NURSE PRACTITIONER

## 2018-01-02 ENCOUNTER — OFFICE VISIT (OUTPATIENT)
Dept: CARDIAC SURGERY | Facility: CLINIC | Age: 58
End: 2018-01-02

## 2018-01-02 VITALS
HEIGHT: 72 IN | WEIGHT: 223.1 LBS | BODY MASS INDEX: 30.22 KG/M2 | OXYGEN SATURATION: 98 % | HEART RATE: 75 BPM | SYSTOLIC BLOOD PRESSURE: 130 MMHG | TEMPERATURE: 97.6 F | DIASTOLIC BLOOD PRESSURE: 64 MMHG

## 2018-01-02 DIAGNOSIS — M96.89 NONUNION OF STERNUM AFTER STERNOTOMY: ICD-10-CM

## 2018-01-02 DIAGNOSIS — Z09 FOLLOW-UP SURGERY CARE: ICD-10-CM

## 2018-01-02 DIAGNOSIS — Z09 FOLLOW-UP SURGERY CARE: Primary | ICD-10-CM

## 2018-01-02 PROCEDURE — 71046 X-RAY EXAM CHEST 2 VIEWS: CPT

## 2018-01-02 PROCEDURE — 99024 POSTOP FOLLOW-UP VISIT: CPT | Performed by: NURSE PRACTITIONER

## 2018-01-02 RX ORDER — AZITHROMYCIN 250 MG/1
TABLET, FILM COATED ORAL
COMMUNITY
Start: 2017-12-30 | End: 2018-01-08 | Stop reason: HOSPADM

## 2018-01-02 RX ORDER — PREDNISONE 5 MG/1
1 TABLET ORAL ONCE
Qty: 1 EACH | Refills: 0 | Status: SHIPPED | OUTPATIENT
Start: 2018-01-02 | End: 2018-01-02

## 2018-01-02 NOTE — PROGRESS NOTES
Subjective    Patient ID: Paddy Brantley is a 57 y.o. male is here today in surgical follow up SP Sternal revision and Repair with Sternal Plates.   Chief Complaint   Patient presents with   • Wound Check   Occasional click.  Chest incision discomfort VAS 3  Does not need additional pain pills   Has productive yellow cough   PCP Enochs  VIVIENNE Resendez     Wound Check     Carotid Artery Disease   Associated symptoms include chest pain (surgical ) and coughing. Pertinent negatives include no abdominal pain, anorexia, chills, fever, numbness or weakness.     Mr Brantley is gentleman  known CAD with PCI 2012, DM, DLP.   He underwent CABG X 4  11/22/16 with unremarkable recovery and dc home on POD 3. He returned to hospital with SOA, Lg Left Hemothorax was drained with CT placement approx 4L total. Remained stable, mild anemia. Plavix was stopped as he was allergic.  Returns today as he called requesting to be seen concerning sternal popping and movement with discomfort.  Has increased over last few weeks.  He elected to proceed with surgical intervention.  He returned to [facility] and underwent said procedure which he tolerated well.  Was discharged home on POD 2.  Returns today in scheduled FU for remainder of staple removal   Provena wound system completed therapy 12/27/17and was removed.  Call 12/30/17 concerning cough, yellow sputum with lo grade fever.  Antx was started.      Recent history:  11/2016: PFT: FEV1 31% Severe Restrictive lung disease.  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    12/19/17  Sternal revision and Repair with Sternal Plates.   01/02/18   "Chest xray:  Appliance intact  Left atelectasis     The following portions of the patient's history were reviewed and updated as appropriate: allergies, current medications, past family history, past medical history, past social history, past surgical history and problem list.  See HPI     Allergies   Allergen Reactions   • Amiodarone Anaphylaxis   • Contrast Dye Anaphylaxis   • Iodine Anaphylaxis   • Multivitamins Anaphylaxis     Oyster calcium   • Proton Pump Inhibitors Shortness Of Breath   • Shellfish-Derived Products Anaphylaxis   • Dexamethasone Other (See Comments)     hiccups   • Livalo [Pitavastatin] Swelling   • Plavix [Clopidogrel Bisulfate] Other (See Comments)     Excessive bleeding   • Statins Swelling   • Tetracyclines & Related Other (See Comments)     \"skin falls off\"   • Penicillins Rash       Current Outpatient Prescriptions:   •  ascorbic acid (VITAMIN C) 1000 MG tablet, Take 1,000 mg by mouth 2 (Two) Times a Day., Disp: , Rfl:   •  aspirin 325 MG tablet, Take 325 mg by mouth Daily., Disp: , Rfl:   •  azithromycin (ZITHROMAX) 250 MG tablet, , Disp: , Rfl:   •  Biotin 5000 MCG tablet, Take 5,000 mcg by mouth Daily. 2 tablets, Disp: , Rfl:   •  Coenzyme Q10 (COQ10) 100 MG capsule, Take 1 tablet by mouth Daily., Disp: , Rfl:   •  diltiaZEM CD (CARTIA XT) 180 MG 24 hr capsule, Take 180 mg by mouth Every Night., Disp: , Rfl:   •  diphenhydrAMINE (BENADRYL) 25 mg capsule, Take 25 mg by mouth Every 6 (Six) Hours As Needed for Itching., Disp: , Rfl:   •  hydrochlorothiazide (HYDRODIURIL) 25 MG tablet, Take 25 mg by mouth Daily., Disp: , Rfl:   •  HYDROcodone-acetaminophen (NORCO)  MG per tablet, Take 1 tablet by mouth Every 4 (Four) Hours As Needed for Moderate Pain  (Surgical Pain)., Disp: 40 tablet, Rfl: 0  •  insulin NPH (HUMULIN N) 100 UNIT/ML injection, Inject 42 Units under the skin 2 (Two) Times a Day., Disp: , Rfl:   •  NOVOLIN R 100 UNIT/ML injection, Inject 22 Units as directed 2 (Two) " Times a Day., Disp: , Rfl:   •  Pseudoeph-Doxylamine-DM-APAP (NYQUIL PO), Take  by mouth., Disp: , Rfl:   •  raNITIdine (ZANTAC) 300 MG tablet, Take 300 mg by mouth 2 (Two) Times a Day., Disp: , Rfl:   •  PredniSONE 5 MG (48) tablet therapy pack dosepak, Take 1 tablet by mouth 1 (One) Time for 1 dose. Take as directed on package instructions., Disp: 1 each, Rfl: 0    Review of Systems   Constitution: Negative for chills, decreased appetite, fever, weakness and malaise/fatigue.   HENT: Negative for hearing loss, hoarse voice and nosebleeds.    Eyes: Negative for visual disturbance.   Cardiovascular: Positive for chest pain (surgical ). Negative for claudication, cyanosis, dyspnea on exertion and leg swelling.        Sternum occasional click  Chest feels tense    Respiratory: Positive for cough and sputum production. Negative for hemoptysis, shortness of breath and wheezing.    Hematologic/Lymphatic: Does not bruise/bleed easily.   Skin: Negative for color change and dry skin.   Musculoskeletal: Negative for falls, muscle cramps and muscle weakness.        Sternal bulge    Gastrointestinal: Negative for abdominal pain, anorexia, hematemesis and melena.   Genitourinary: Negative for hematuria.   Neurological: Negative for difficulty with concentration, dizziness, numbness and paresthesias.   Psychiatric/Behavioral: Negative for altered mental status.   Allergic/Immunologic: Negative for hives.        Objective   Physical Exam   Constitutional: He is oriented to person, place, and time. He appears well-nourished.   HENT:   Head: Normocephalic.   Mouth/Throat: Oropharynx is clear and moist.   Eyes: Conjunctivae are normal. Pupils are equal, round, and reactive to light.   Neck: No JVD present.   Cardiovascular: Normal rate, regular rhythm, normal heart sounds and intact distal pulses.    Sternum stable with cough      Pulmonary/Chest: Effort normal. No stridor. He has no wheezes. He has no rales.   Sternum  tender  Rhonchi and tight BS    Abdominal: Soft. Bowel sounds are normal. There is no tenderness.   Musculoskeletal: He exhibits no edema.   Neurological: He is alert and oriented to person, place, and time.   Skin: Skin is warm and dry. No erythema.   Sternal incision clean and dry.  Remainder of staples removed.  Tolerated well.  Pt does not tolerated mastisol or steristrips.    Psychiatric: His behavior is normal. Judgment normal.   Nursing note and vitals reviewed.      Vitals:    01/02/18 0947   BP: 130/64   Pulse: 75   Temp: 97.6 °F (36.4 °C)   SpO2: 98%         Assessment/Plan   Independent Review of Radiographic Studies:    01/02/18  Chest xray:  Appliance intact  Left atelectasis     1. 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  PO Pain  Controlled with usual medications.        3.  Follow-up surgery care  Shower daily  No lifting > 4-6 lbs   Follow up 2 wks unless  Signs and symptoms of infection including drainage from operative site, redness, swelling, with associated fever and/or chills notify [facility] immediately for wound check.       4.  Bronchitis  Complete antx  Medrol dose luis a for inflammation  Nyquil for HS cough suppressant

## 2018-01-02 NOTE — PATIENT INSTRUCTIONS
Wound shower daily  Keep splinting   No lifting  Signs and symptoms of infection including drainage from operative site, redness, swelling, with associated fever and/or chills notify [facility] center immediately for wound check.   Recheck 2 wks with chest xray

## 2018-01-05 ENCOUNTER — HOSPITAL ENCOUNTER (INPATIENT)
Facility: HOSPITAL | Age: 58
LOS: 3 days | Discharge: HOME | End: 2018-01-08
Attending: EMERGENCY MEDICINE | Admitting: THORACIC SURGERY (CARDIOTHORACIC VASCULAR SURGERY)

## 2018-01-05 ENCOUNTER — APPOINTMENT (OUTPATIENT)
Dept: CT IMAGING | Facility: HOSPITAL | Age: 58
End: 2018-01-05

## 2018-01-05 ENCOUNTER — APPOINTMENT (OUTPATIENT)
Dept: GENERAL RADIOLOGY | Facility: HOSPITAL | Age: 58
End: 2018-01-05

## 2018-01-05 DIAGNOSIS — J86.9 ABSCESS OF CHEST (HCC): Primary | ICD-10-CM

## 2018-01-05 PROBLEM — S22.23XK STERNAL MANUBRIAL DISSOCIATION WITH NONUNION: Status: ACTIVE | Noted: 2018-01-05

## 2018-01-05 LAB
ANION GAP SERPL CALCULATED.3IONS-SCNC: 11 MMOL/L (ref 5–15)
BASOPHILS # BLD AUTO: 0.03 10*3/MM3 (ref 0–0.2)
BASOPHILS NFR BLD AUTO: 0.2 % (ref 0–2)
BUN BLD-MCNC: 30 MG/DL (ref 7–21)
BUN/CREAT SERPL: 32.6 (ref 7–25)
CALCIUM SPEC-SCNC: 9.2 MG/DL (ref 8.4–10.2)
CHLORIDE SERPL-SCNC: 99 MMOL/L (ref 95–110)
CO2 SERPL-SCNC: 28 MMOL/L (ref 22–31)
CREAT BLD-MCNC: 0.92 MG/DL (ref 0.7–1.3)
DEPRECATED RDW RBC AUTO: 41.5 FL (ref 35.1–43.9)
EOSINOPHIL # BLD AUTO: 0.04 10*3/MM3 (ref 0–0.7)
EOSINOPHIL NFR BLD AUTO: 0.3 % (ref 0–7)
ERYTHROCYTE [DISTWIDTH] IN BLOOD BY AUTOMATED COUNT: 12.8 % (ref 11.5–14.5)
GFR SERPL CREATININE-BSD FRML MDRD: 84 ML/MIN/1.73 (ref 60–130)
GLUCOSE BLD-MCNC: 113 MG/DL (ref 60–100)
HCT VFR BLD AUTO: 43.9 % (ref 39–49)
HGB BLD-MCNC: 14.9 G/DL (ref 13.7–17.3)
IMM GRANULOCYTES # BLD: 0.2 10*3/MM3 (ref 0–0.02)
IMM GRANULOCYTES NFR BLD: 1.3 % (ref 0–0.5)
LYMPHOCYTES # BLD AUTO: 1.59 10*3/MM3 (ref 0.6–4.2)
LYMPHOCYTES NFR BLD AUTO: 10.5 % (ref 10–50)
MCH RBC QN AUTO: 30 PG (ref 26.5–34)
MCHC RBC AUTO-ENTMCNC: 33.9 G/DL (ref 31.5–36.3)
MCV RBC AUTO: 88.5 FL (ref 80–98)
MONOCYTES # BLD AUTO: 1.12 10*3/MM3 (ref 0–0.9)
MONOCYTES NFR BLD AUTO: 7.4 % (ref 0–12)
NEUTROPHILS # BLD AUTO: 12.12 10*3/MM3 (ref 2–8.6)
NEUTROPHILS NFR BLD AUTO: 80.3 % (ref 37–80)
PLATELET # BLD AUTO: 304 10*3/MM3 (ref 150–450)
PMV BLD AUTO: 8.4 FL (ref 8–12)
POTASSIUM BLD-SCNC: 4.3 MMOL/L (ref 3.5–5.1)
RBC # BLD AUTO: 4.96 10*6/MM3 (ref 4.37–5.74)
SODIUM BLD-SCNC: 138 MMOL/L (ref 137–145)
WBC NRBC COR # BLD: 15.1 10*3/MM3 (ref 3.2–9.8)

## 2018-01-05 PROCEDURE — G0378 HOSPITAL OBSERVATION PER HR: HCPCS

## 2018-01-05 PROCEDURE — 85025 COMPLETE CBC W/AUTO DIFF WBC: CPT | Performed by: EMERGENCY MEDICINE

## 2018-01-05 PROCEDURE — 99284 EMERGENCY DEPT VISIT MOD MDM: CPT

## 2018-01-05 PROCEDURE — 71250 CT THORAX DX C-: CPT

## 2018-01-05 PROCEDURE — 87040 BLOOD CULTURE FOR BACTERIA: CPT | Performed by: EMERGENCY MEDICINE

## 2018-01-05 PROCEDURE — 87205 SMEAR GRAM STAIN: CPT | Performed by: THORACIC SURGERY (CARDIOTHORACIC VASCULAR SURGERY)

## 2018-01-05 PROCEDURE — 87070 CULTURE OTHR SPECIMN AEROBIC: CPT | Performed by: THORACIC SURGERY (CARDIOTHORACIC VASCULAR SURGERY)

## 2018-01-05 PROCEDURE — 71046 X-RAY EXAM CHEST 2 VIEWS: CPT

## 2018-01-05 PROCEDURE — 80048 BASIC METABOLIC PNL TOTAL CA: CPT | Performed by: EMERGENCY MEDICINE

## 2018-01-05 PROCEDURE — 25010000002 VANCOMYCIN PER 500 MG: Performed by: EMERGENCY MEDICINE

## 2018-01-05 PROCEDURE — 99024 POSTOP FOLLOW-UP VISIT: CPT | Performed by: THORACIC SURGERY (CARDIOTHORACIC VASCULAR SURGERY)

## 2018-01-05 PROCEDURE — 87015 SPECIMEN INFECT AGNT CONCNTJ: CPT | Performed by: THORACIC SURGERY (CARDIOTHORACIC VASCULAR SURGERY)

## 2018-01-05 PROCEDURE — 82962 GLUCOSE BLOOD TEST: CPT

## 2018-01-05 RX ORDER — DEXTROSE MONOHYDRATE 25 G/50ML
25 INJECTION, SOLUTION INTRAVENOUS
Status: DISCONTINUED | OUTPATIENT
Start: 2018-01-05 | End: 2018-01-08 | Stop reason: HOSPADM

## 2018-01-05 RX ORDER — HYDROCHLOROTHIAZIDE 25 MG/1
25 TABLET ORAL DAILY
Status: DISCONTINUED | OUTPATIENT
Start: 2018-01-06 | End: 2018-01-08 | Stop reason: HOSPADM

## 2018-01-05 RX ORDER — FAMOTIDINE 40 MG/1
40 TABLET, FILM COATED ORAL 2 TIMES DAILY
Status: DISCONTINUED | OUTPATIENT
Start: 2018-01-06 | End: 2018-01-08 | Stop reason: HOSPADM

## 2018-01-05 RX ORDER — DIPHENHYDRAMINE HCL 25 MG
25 CAPSULE ORAL EVERY 6 HOURS PRN
Status: DISCONTINUED | OUTPATIENT
Start: 2018-01-05 | End: 2018-01-08 | Stop reason: HOSPADM

## 2018-01-05 RX ORDER — DILTIAZEM HYDROCHLORIDE 180 MG/1
180 CAPSULE, COATED, EXTENDED RELEASE ORAL NIGHTLY
Status: DISCONTINUED | OUTPATIENT
Start: 2018-01-06 | End: 2018-01-08 | Stop reason: HOSPADM

## 2018-01-05 RX ORDER — ASCORBIC ACID 500 MG
1000 TABLET ORAL DAILY
Status: DISCONTINUED | OUTPATIENT
Start: 2018-01-06 | End: 2018-01-08 | Stop reason: HOSPADM

## 2018-01-05 RX ORDER — ASPIRIN 325 MG
325 TABLET ORAL DAILY
Status: DISCONTINUED | OUTPATIENT
Start: 2018-01-06 | End: 2018-01-08 | Stop reason: HOSPADM

## 2018-01-05 RX ORDER — NICOTINE POLACRILEX 4 MG
15 LOZENGE BUCCAL
Status: DISCONTINUED | OUTPATIENT
Start: 2018-01-05 | End: 2018-01-08 | Stop reason: HOSPADM

## 2018-01-05 RX ORDER — HYDROCODONE BITARTRATE AND ACETAMINOPHEN 7.5; 325 MG/1; MG/1
1 TABLET ORAL ONCE
Status: COMPLETED | OUTPATIENT
Start: 2018-01-05 | End: 2018-01-05

## 2018-01-05 RX ORDER — SODIUM CHLORIDE 0.9 % (FLUSH) 0.9 %
10 SYRINGE (ML) INJECTION AS NEEDED
Status: DISCONTINUED | OUTPATIENT
Start: 2018-01-05 | End: 2018-01-08 | Stop reason: HOSPADM

## 2018-01-05 RX ORDER — LEVOFLOXACIN 5 MG/ML
750 INJECTION, SOLUTION INTRAVENOUS EVERY 24 HOURS
Status: DISCONTINUED | OUTPATIENT
Start: 2018-01-05 | End: 2018-01-08 | Stop reason: HOSPADM

## 2018-01-05 RX ORDER — HYDROCODONE BITARTRATE AND ACETAMINOPHEN 10; 325 MG/1; MG/1
1 TABLET ORAL EVERY 4 HOURS PRN
Status: DISCONTINUED | OUTPATIENT
Start: 2018-01-05 | End: 2018-01-08 | Stop reason: HOSPADM

## 2018-01-05 RX ORDER — GUAIFENESIN/DEXTROMETHORPHAN 100-10MG/5
10 SYRUP ORAL EVERY 8 HOURS PRN
Status: DISCONTINUED | OUTPATIENT
Start: 2018-01-05 | End: 2018-01-08 | Stop reason: HOSPADM

## 2018-01-05 RX ADMIN — HYDROCODONE BITARTRATE AND ACETAMINOPHEN 1 TABLET: 7.5; 325 TABLET ORAL at 21:26

## 2018-01-05 RX ADMIN — VANCOMYCIN HYDROCHLORIDE 2000 MG: 1 INJECTION, POWDER, LYOPHILIZED, FOR SOLUTION INTRAVENOUS at 23:10

## 2018-01-06 LAB
GLUCOSE BLDC GLUCOMTR-MCNC: 114 MG/DL (ref 70–130)
GLUCOSE BLDC GLUCOMTR-MCNC: 152 MG/DL (ref 70–130)
GLUCOSE BLDC GLUCOMTR-MCNC: 162 MG/DL (ref 70–130)
GLUCOSE BLDC GLUCOMTR-MCNC: 207 MG/DL (ref 70–130)
GLUCOSE BLDC GLUCOMTR-MCNC: 242 MG/DL (ref 70–130)
GLUCOSE BLDC GLUCOMTR-MCNC: 308 MG/DL (ref 70–130)
HOLD SPECIMEN: NORMAL
HOLD SPECIMEN: NORMAL
WHOLE BLOOD HOLD SPECIMEN: NORMAL
WHOLE BLOOD HOLD SPECIMEN: NORMAL

## 2018-01-06 PROCEDURE — 63710000001 INSULIN ASPART PER 5 UNITS: Performed by: THORACIC SURGERY (CARDIOTHORACIC VASCULAR SURGERY)

## 2018-01-06 PROCEDURE — 99024 POSTOP FOLLOW-UP VISIT: CPT | Performed by: THORACIC SURGERY (CARDIOTHORACIC VASCULAR SURGERY)

## 2018-01-06 PROCEDURE — 63710000001 INSULIN ISOPHANE HUMAN PER 5 UNITS: Performed by: THORACIC SURGERY (CARDIOTHORACIC VASCULAR SURGERY)

## 2018-01-06 PROCEDURE — 82962 GLUCOSE BLOOD TEST: CPT

## 2018-01-06 PROCEDURE — 25010000002 LEVOFLOXACIN PER 250 MG: Performed by: THORACIC SURGERY (CARDIOTHORACIC VASCULAR SURGERY)

## 2018-01-06 PROCEDURE — G0378 HOSPITAL OBSERVATION PER HR: HCPCS

## 2018-01-06 PROCEDURE — 94799 UNLISTED PULMONARY SVC/PX: CPT

## 2018-01-06 RX ORDER — INSULIN ASPART 100 [IU]/ML
64 INJECTION, SUSPENSION SUBCUTANEOUS 2 TIMES DAILY WITH MEALS
Status: DISCONTINUED | OUTPATIENT
Start: 2018-01-06 | End: 2018-01-08 | Stop reason: HOSPADM

## 2018-01-06 RX ORDER — SODIUM CHLORIDE 9 MG/ML
INJECTION, SOLUTION INTRAVENOUS
Status: COMPLETED
Start: 2018-01-06 | End: 2018-01-07

## 2018-01-06 RX ADMIN — DILTIAZEM HYDROCHLORIDE 180 MG: 180 CAPSULE, COATED, EXTENDED RELEASE ORAL at 20:44

## 2018-01-06 RX ADMIN — HUMAN INSULIN 42 UNITS: 100 INJECTION, SUSPENSION SUBCUTANEOUS at 07:53

## 2018-01-06 RX ADMIN — LEVOFLOXACIN 750 MG: 5 INJECTION, SOLUTION INTRAVENOUS at 22:21

## 2018-01-06 RX ADMIN — SODIUM CHLORIDE 1000 ML: 9 INJECTION, SOLUTION INTRAVENOUS at 22:21

## 2018-01-06 RX ADMIN — OXYCODONE HYDROCHLORIDE AND ACETAMINOPHEN 1000 MG: 500 TABLET ORAL at 07:54

## 2018-01-06 RX ADMIN — HYDROCHLOROTHIAZIDE 25 MG: 25 TABLET ORAL at 07:54

## 2018-01-06 RX ADMIN — GUAIFENESIN AND DEXTROMETHORPHAN 10 ML: 100; 10 SYRUP ORAL at 17:00

## 2018-01-06 RX ADMIN — HYDROCODONE BITARTRATE AND ACETAMINOPHEN 1 TABLET: 10; 325 TABLET ORAL at 00:16

## 2018-01-06 RX ADMIN — INSULIN ASPART 8 UNITS: 100 INJECTION, SOLUTION INTRAVENOUS; SUBCUTANEOUS at 20:44

## 2018-01-06 RX ADMIN — LEVOFLOXACIN 750 MG: 5 INJECTION, SOLUTION INTRAVENOUS at 01:31

## 2018-01-06 RX ADMIN — HYDROCODONE BITARTRATE AND ACETAMINOPHEN 1 TABLET: 10; 325 TABLET ORAL at 05:28

## 2018-01-06 RX ADMIN — HYDROCODONE BITARTRATE AND ACETAMINOPHEN 1 TABLET: 10; 325 TABLET ORAL at 17:00

## 2018-01-06 RX ADMIN — ASPIRIN 325 MG: 325 TABLET, COATED ORAL at 07:54

## 2018-01-06 RX ADMIN — INSULIN ASPART 16 UNITS: 100 INJECTION, SOLUTION INTRAVENOUS; SUBCUTANEOUS at 17:08

## 2018-01-06 NOTE — PLAN OF CARE
Problem: Patient Care Overview (Adult)  Goal: Plan of Care Review  Outcome: Ongoing (interventions implemented as appropriate)   01/06/18 0351   Coping/Psychosocial Response Interventions   Plan Of Care Reviewed With patient   Patient Care Overview   Progress no change     Goal: Adult Individualization and Mutuality  Outcome: Ongoing (interventions implemented as appropriate)    Goal: Discharge Needs Assessment  Outcome: Ongoing (interventions implemented as appropriate)      Problem: Infection, Risk/Actual (Adult)  Goal: Identify Related Risk Factors and Signs and Symptoms  Outcome: Ongoing (interventions implemented as appropriate)    Goal: Infection Prevention/Resolution  Outcome: Ongoing (interventions implemented as appropriate)      Problem: Pain, Acute (Adult)  Goal: Identify Related Risk Factors and Signs and Symptoms  Outcome: Ongoing (interventions implemented as appropriate)    Goal: Acceptable Pain Control/Comfort Level  Outcome: Ongoing (interventions implemented as appropriate)

## 2018-01-06 NOTE — PLAN OF CARE
Problem: Patient Care Overview (Adult)  Goal: Plan of Care Review  Outcome: Ongoing (interventions implemented as appropriate)   01/06/18 2916   Coping/Psychosocial Response Interventions   Plan Of Care Reviewed With patient   Patient Care Overview   Progress improving     Goal: Adult Individualization and Mutuality  Outcome: Ongoing (interventions implemented as appropriate)    Goal: Discharge Needs Assessment  Outcome: Ongoing (interventions implemented as appropriate)      Problem: Infection, Risk/Actual (Adult)  Goal: Identify Related Risk Factors and Signs and Symptoms  Outcome: Ongoing (interventions implemented as appropriate)    Goal: Infection Prevention/Resolution  Outcome: Ongoing (interventions implemented as appropriate)      Problem: Pain, Acute (Adult)  Goal: Identify Related Risk Factors and Signs and Symptoms  Outcome: Ongoing (interventions implemented as appropriate)    Goal: Acceptable Pain Control/Comfort Level  Outcome: Ongoing (interventions implemented as appropriate)

## 2018-01-06 NOTE — ED PROVIDER NOTES
"Subjective   HPI Comments: 56yo male pmh significant htn/dm2/cad/restrictive lung disease, s/p sternal revision 12.19.2017, presents ED c/o 1d hx sternal swelling/discomfort.  ROS (+) nonproductive cough. Denies fever/chills/soa/abd pain.    Patient is a 57 y.o. male presenting with general illness.   Illness   Severity:  Moderate  Onset quality:  Sudden  Duration:  1 day  Timing:  Constant  Chronicity:  New  Associated symptoms: chest pain and cough        Review of Systems   Constitutional: Negative.    HENT: Negative.    Eyes: Negative.    Respiratory: Positive for cough.    Cardiovascular: Positive for chest pain.   Gastrointestinal: Negative.    Endocrine: Negative.    Genitourinary: Negative.    Musculoskeletal: Negative.    Skin: Negative.        Past Medical History:   Diagnosis Date   • Accident caused by hypodermic needle    • Guerra esophagus    • CAD (coronary artery disease)    • Contact with and (suspected) exposure to potentially hazardous body fluids    • Diabetes mellitus    • GERD (gastroesophageal reflux disease)    • Kidney stones    • Meniere's disease    • MRSA infection     right ing hernia   • PAD (peripheral artery disease)        Allergies   Allergen Reactions   • Amiodarone Anaphylaxis   • Contrast Dye Anaphylaxis   • Iodine Anaphylaxis   • Multivitamins Anaphylaxis     Oyster calcium   • Proton Pump Inhibitors Shortness Of Breath   • Shellfish-Derived Products Anaphylaxis   • Dexamethasone Other (See Comments)     hiccups   • Livalo [Pitavastatin] Swelling   • Plavix [Clopidogrel Bisulfate] Other (See Comments)     Excessive bleeding   • Statins Swelling   • Tetracyclines & Related Other (See Comments)     \"skin falls off\"   • Penicillins Rash       Past Surgical History:   Procedure Laterality Date   • APPENDECTOMY     • CARDIAC CATHETERIZATION     • CARDIAC SURGERY  11/22/2016    CABG   • CHEST TUBE INSERTION     • CORONARY ARTERY BYPASS GRAFT     • GALLBLADDER SURGERY     • INCISION " AND DRAINAGE ABSCESS     • INGUINAL HERNIA REPAIR Bilateral    • LUMBAR LAMINECTOMY     • STERNAL REWIRING N/A 12/19/2017    Procedure: REPAIR STERNAL DEHISCENCE WITH STERNAL DEBRIDEMENT  Need plate and screws;  Surgeon: Homero Sutton MD;  Location: [facility] OR;  Service:    • UMBILICAL HERNIA REPAIR         History reviewed. No pertinent family history.    Social History     Social History   • Marital status: Single     Spouse name: N/A   • Number of children: N/A   • Years of education: N/A     Social History Main Topics   • Smoking status: Former Smoker     Years: 40.00     Quit date: 1/3/2017   • Smokeless tobacco: Never Used   • Alcohol use No   • Drug use: No   • Sexual activity: Defer     Other Topics Concern   • None     Social History Narrative           Objective   Physical Exam   Constitutional: He is oriented to person, place, and time. He appears well-developed and well-nourished.   HENT:   Head: Normocephalic and atraumatic.   Mouth/Throat: Oropharynx is clear and moist.   Eyes: Pupils are equal, round, and reactive to light.   Neck: Neck supple. No JVD present. No tracheal deviation present.   Cardiovascular: Normal rate, regular rhythm, normal heart sounds and intact distal pulses.  Exam reveals no gallop and no friction rub.    No murmur heard.  Pulmonary/Chest: Effort normal and breath sounds normal. No respiratory distress. He has no wheezes. He has no rales. He exhibits tenderness.       Abdominal: Soft. Bowel sounds are normal.   Musculoskeletal: He exhibits no edema.   Lymphadenopathy:     He has no cervical adenopathy.   Neurological: He is alert and oriented to person, place, and time.   Skin: Skin is warm and dry.   Nursing note and vitals reviewed.      Procedures         ED Course  ED Course   Comment By Time   Dr. Sutton consulted. Will admit for further evaluation. Héctor Hernandez MD 01/05 0362      Labs Reviewed   BASIC METABOLIC PANEL - Abnormal; Notable for the following:         Result Value    Glucose 113 (*)     BUN 30 (*)     BUN/Creatinine Ratio 32.6 (*)     All other components within normal limits   CBC WITH AUTO DIFFERENTIAL - Abnormal; Notable for the following:     WBC 15.10 (*)     Neutrophil % 80.3 (*)     Immature Grans % 1.3 (*)     Neutrophils, Absolute 12.12 (*)     Monocytes, Absolute 1.12 (*)     Immature Grans, Absolute 0.20 (*)     All other components within normal limits   BLOOD CULTURE   BLOOD CULTURE   CBC AND DIFFERENTIAL    Narrative:     The following orders were created for panel order CBC & Differential.  Procedure                               Abnormality         Status                     ---------                               -----------         ------                     CBC Auto Differential[[id number]]        Abnormal            Final result                 Please view results for these tests on the individual orders.     Xr Chest 2 View    Result Date: 1/5/2018  Narrative: [facility] [place] Patient Name: PALOMO SPEARS ORDERING: MARLYN FLORES ATTENDING: MARLYN FLORES REFERRING: MARLYN FLORES ----------------------- PROCEDURE: Two-view chest COMPARISON: 1/2/2018 HISTORY: chest pain FINDINGS: Frontal and lateral views of the chest are obtained. Devices: None Lungs/Pleura: There is similar mild asymmetrical elevation of the left diaphragm and minimal left basilar scarring. No consolidation, effusion, or pneumothorax. Cardiomediastinal Structures: The heart size and mediastinal contours within limits of normal. The trachea is midline. Skeletal Structures: No acute findings. Redemonstration of postprocedural changes of the sternum. No free air beneath the diaphragm.     Impression: No acute pulmonary or pleural finding. Electronically signed by:  Josue Mondragon MD  1/5/2018 7:55 PM [facility] Workstation: [id number]    Xr Chest 2 View    Result Date: 1/2/2018  Narrative: TWO VIEW CHEST HISTORY: Cough. Postop sternotomy rewiring. Frontal and lateral  films of the chest were obtained. COMPARISON: 12/19/2017 Again revision of sternotomy with internal fixation plate and screw devices. Skin staples have been removed. The lungs are clear of an acute process. The heart is not enlarged. The pulmonary vasculature is not increased. No pleural effusion. No pneumothorax. No acute osseous abnormality.     Impression: CONCLUSION: Again revision of sternotomy with internal fixation plate and screw devices. Skin staples have been removed. The lungs are clear of an acute process. [id number] Electronically signed by:  Tacho Carver MD  1/2/2018 12:34 PM CST Workstation: [id number]    Ct Chest Without Contrast    Result Date: 1/5/2018  Narrative: PROCEDURE: CT CHEST WO CONTRAST INDICATION: Pain HISTORY: Sternal revision COMPARISON: 10/18/2017  TECHNIQUE:   - reconstructions: Axial, coronal, sagittal   - contrast:  oral:  None                      intravenous: None This exam was performed according to our departmental dose-optimization program, which includes automated exposure control, adjustment of the mA and/or kV according to patient size and/or use of iterative reconstruction technique. DLP is 556.8 FINDINGS: PULMONARY PARENCHYMA:    - air spaces: Negative   - interstitium: Grossly within normal limits for age   - misc: No pulmonary nodules or mass  MEDIASTINUM / SUNDEEP:    - heart: Normal size, no pericardial fluid   - aorta/great vessels: Normal caliber and configuration for age. Atherosclerotic vascular calcification present   - misc: No mediastinal mass / significant adenopathy  PLEURAL COMPARTMENT:    - misc: No pleural fluid or mass    MISC:    - inferior neck: Negative   - osseous/body wall: A low-attenuation collection along the sternum both anterior and retrosternal measuring approximately 6.3 x 4.2 x 14 cm is present, which could represent postoperative change, though abscess would be difficult to exclude in this noncontrast examination. Patient appears to be status  post surgical revision   - subdiaphramatic: As visualized, limited, grossly unremarkable      Impression: Appears to be status post sternal revision. Low-attenuation collection both anterior to the sternum and retrosternal, from the proximal sternum 2 inferior to the xiphoid as above. This could represent postoperative change, but abscess would be difficult to exclude in this noncontrast examination. Follow-up suggested as clinically warranted. Electronically signed by:  Carol Ayala MD  1/5/2018 8:26 PM CST Workstation: [id number]    Xr Chest 1 View    Result Date: 12/19/2017  Narrative: PORTABLE CHEST HISTORY: Sternal revision. Postoperative study. Portable AP film of the chest was obtained at 3:23 PM. COMPARISON:  October 12, 2017 Revision of the sternotomy with internal fixation plate and screw devices now in place. Midline surgical skin staples. Midline drain. Minimal linear atelectasis lung bases. The heart is not enlarged. The pulmonary vasculature is not increased. No pleural effusion. No pneumothorax. No acute osseous abnormality.     Impression: CONCLUSION: Revision of sternotomy. Minimal linear atelectasis lung bases. [id number] Electronically signed by:  Tacho Carver MD  12/19/2017 3:59 PM CST Workstation: [id number]                [facility]    Final diagnoses:   Abscess of chest            Héctor Hernandez MD  01/05/18 8534

## 2018-01-06 NOTE — PROGRESS NOTES
"CTVS DAILY NOTE     LOS: 1 day   Mediastinal hemorrhage    Patient Care Team:  Nick Modi MD as PCP - General    Chief Complaint: chest pain    Subjective:  Symptoms:  Stable.  He reports chest pain.    Pain:  He complains of pain that is moderate.  He reports pain is improving.  Pain is well controlled and requiring pain medication.        Vital Signs  Temp:  [97.5 °F (36.4 °C)-98.1 °F (36.7 °C)] 97.5 °F (36.4 °C)  Heart Rate:  [62-92] 62  Resp:  [18-20] 18  BP: (131-182)/(62-77) 131/62  Body mass index is 33.54 kg/(m^2).    Intake/Output Summary (Last 24 hours) at 01/06/18 0907  Last data filed at 01/06/18 0230   Gross per 24 hour   Intake              650 ml   Output                0 ml   Net              650 ml          Wt Readings from Last 3 Encounters:   01/06/18 100 kg (220 lb 9.6 oz)   01/02/18 101 kg (223 lb 1.6 oz)   12/28/17 102 kg (224 lb)       Objective:  General Appearance:  Comfortable.    Vital signs: (most recent): Blood pressure 131/62, pulse 62, temperature 97.5 °F (36.4 °C), temperature source Temporal Artery , resp. rate 18, height 172.7 cm (68\"), weight 100 kg (220 lb 9.6 oz), SpO2 96 %.  Vital signs are normal.    Lungs:  Normal effort.  Breath sounds clear to auscultation.    Heart: Normal rate.  Regular rhythm.    Abdomen: Abdomen is soft.    Pulses: Distal pulses are intact.    Neurological: Patient is alert and oriented to person, place and time.    Skin:  Warm and dry.            Incisions:  Clean and dry.    Results Review:      WBC No results found for: WBCS   HGB Hemoglobin   Date/Time Value Ref Range Status   01/05/2018 1955 14.9 13.7 - 17.3 g/dL Final      HCT Hematocrit   Date/Time Value Ref Range Status   01/05/2018 1955 43.9 39.0 - 49.0 % Final      Platlets No results found for: LABPLAT     PT/INR:  No results found for: PROTIME/No results found for: INR    Sodium Sodium   Date/Time Value Ref Range Status   01/05/2018 1955 138 137 - 145 mmol/L Final      Potassium " Potassium   Date/Time Value Ref Range Status   01/05/2018 1955 4.3 3.5 - 5.1 mmol/L Final      Chloride Chloride   Date/Time Value Ref Range Status   01/05/2018 1955 99 95 - 110 mmol/L Final      Bicarbonate No results found for: PLASMABICARB   BUN BUN   Date/Time Value Ref Range Status   01/05/2018 1955 30 (H) 7 - 21 mg/dL Final      Creatinine Creatinine   Date/Time Value Ref Range Status   01/05/2018 1955 0.92 0.70 - 1.30 mg/dL Final      Calcium Calcium   Date/Time Value Ref Range Status   01/05/2018 1955 9.2 8.4 - 10.2 mg/dL Final      Magnesium No results found for: MG     Imaging Results (last 72 hours)     Procedure Component Value Units Date/Time    XR Chest 2 View [[id number]] Collected:  01/05/18 1932     Updated:  01/05/18 1956    Narrative:         [facility]    Patient Name: PALOMO SPEARS    ORDERING: MARLYN FLORES     ATTENDING: MARLYN FLORES     REFERRING: MARLYN FLORES    -----------------------    PROCEDURE: Two-view chest    COMPARISON: 1/2/2018    HISTORY: chest pain    FINDINGS: Frontal and lateral views of the chest are obtained.     Devices: None    Lungs/Pleura: There is similar mild asymmetrical elevation of the  left diaphragm and minimal left basilar scarring. No  consolidation, effusion, or pneumothorax.    Cardiomediastinal Structures: The heart size and mediastinal  contours within limits of normal. The trachea is midline.     Skeletal Structures: No acute findings. Redemonstration of  postprocedural changes of the sternum. No free air beneath the  diaphragm.      Impression:       No acute pulmonary or pleural finding.    Electronically signed by:  Josue Mondragon MD  1/5/2018 7:55 PM CST  Workstation: [id number]    CT Chest Without Contrast [[id number]] Collected:  01/05/18 1950     Updated:  01/05/18 2027    Narrative:       PROCEDURE: CT CHEST WO CONTRAST    INDICATION: Pain    HISTORY: Sternal revision    COMPARISON: 10/18/2017     TECHNIQUE:    -  reconstructions: Axial, coronal, sagittal    - contrast:  oral:  None                       intravenous: None    This exam was performed according to our departmental  dose-optimization program, which includes automated exposure  control, adjustment of the mA and/or kV according to patient size  and/or use of iterative reconstruction technique.  DLP is 556.8    FINDINGS:    PULMONARY PARENCHYMA:      - air spaces: Negative    - interstitium: Grossly within normal limits for age    - misc: No pulmonary nodules or mass     MEDIASTINUM / SUNDEEP:      - heart: Normal size, no pericardial fluid    - aorta/great vessels: Normal caliber and configuration for  age. Atherosclerotic vascular calcification present    - misc: No mediastinal mass / significant adenopathy     PLEURAL COMPARTMENT:      - misc: No pleural fluid or mass        MISC:      - inferior neck: Negative    - osseous/body wall: A low-attenuation collection along the  sternum both anterior and retrosternal measuring approximately  6.3 x 4.2 x 14 cm is present, which could represent postoperative  change, though abscess would be difficult to exclude in this  noncontrast examination. Patient appears to be status post  surgical revision    - subdiaphramatic: As visualized, limited, grossly unremarkable       Impression:       Appears to be status post sternal revision.  Low-attenuation collection both anterior to the sternum and  retrosternal, from the proximal sternum 2 inferior to the xiphoid  as above. This could represent postoperative change, but abscess  would be difficult to exclude in this noncontrast examination.  Follow-up suggested as clinically warranted.    Electronically signed by:  Carol Ayala MD  1/5/2018 8:26 PM CST  Workstation: [id number]              aspirin 325 mg Oral Daily   diltiaZEM  mg Oral Nightly   famotidine 40 mg Oral BID   hydrochlorothiazide 25 mg Oral Daily   insulin aspart 0-24 Units Subcutaneous 4x Daily AC & at Bedtime    insulin NPH 42 Units Subcutaneous BID AC   levoFLOXacin 750 mg Intravenous Q24H   ascorbic acid 1,000 mg Oral Daily            Patient Active Problem List   Diagnosis Code   • Follow-up surgery care Z09   • Coronary artery disease involving native coronary artery of native heart with unstable angina pectoris I25.110   • Dyslipidemia E78.5   • Dyspnea on exertion R06.09   • Left lower lobe pneumonia J18.1   • S/P CABG (coronary artery bypass graft) Z95.1   • Essential hypertension I10   • Tobacco abuse Z72.0   • 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     Plan:   (Continue vancomycin, levaquin  Await cultures  May require open debridement, wound vac).       Homero Sutton MD  01/06/18  9:07 AM

## 2018-01-06 NOTE — CONSULTS
CVTS CONSULTATION    Patient Care Team:  Nick Modi MD as PCP - General    Chief complaint: chest swelling    Subjective   57yr man with CAD with PCI 2012, DM, DLP.  swelling incision site noted today.  He underwent CABG X 4  11/22/16 with unremarkable recovery and dc home on POD 3. He returned to hospital with SOA, Lg Left Hemothorax was drained with CT placement approx 4L total. superficial sternal wound infection. Remained stable, mild anemia. Plavix was stopped as he was allergic.  Returns today as he called requesting to be seen concerning sternal popping and movement with discomfort.  Has increased over last few weeks.  He elected to proceed with surgical intervention.  He returned to [facility] and underwent sternal debridement, revision, plating which he tolerated well.  Was discharged home on POD 2.  Returns today in  for remainder of staple removal   Provena wound system completed therapy 12/27/17 and was removed.  Call 12/30/17 concerning cough, yellow sputum with lo grade fever.  Abx started.       Recent history:  11/2016: PFT: FEV1 31% Severe Restrictive lung disease.  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    12/19/17  Sternal revision and Repair with Sternal Plates.   01/02/18  Chest xray:  Appliance intact  Left atelectasis   1/5/2018 CT Chest:  Mild to moderate fluid collection anterior sternum.  3/4 plates intact.  Inferior plates appears to have pulled thru RIGHT hemisternum.    The following portions of the patient's history were reviewed and updated as appropriate: allergies, current medications, past family history, past medical  history, past social history, past surgical history and problem list.  Recent images independently reviewed.  Available laboratory values reviewed.    Review of Systems   Constitutional: Positive for fatigue. Negative for activity change, appetite change, fever and unexpected weight change.   HENT: Negative for nosebleeds, sore throat, trouble swallowing and voice change.    Eyes: Negative for photophobia, pain and redness.   Respiratory: Positive for cough and shortness of breath. Negative for chest tightness and stridor.    Cardiovascular: Positive for chest pain. Negative for palpitations and leg swelling.   Gastrointestinal: Negative for abdominal pain, constipation, nausea and vomiting.   Endocrine: Negative for cold intolerance and heat intolerance.   Musculoskeletal: Negative for arthralgias, back pain and neck pain.   Skin: Negative for pallor, rash and wound.   Neurological: Negative for dizziness, seizures, syncope, numbness and headaches.   Hematological: Negative for adenopathy. Does not bruise/bleed easily.   Psychiatric/Behavioral: Negative for behavioral problems, confusion and hallucinations. The patient is not nervous/anxious.         Past Medical History:   Diagnosis Date   • Accident caused by hypodermic needle    • Guerra esophagus    • CAD (coronary artery disease)    • Contact with and (suspected) exposure to potentially hazardous body fluids    • Diabetes mellitus    • GERD (gastroesophageal reflux disease)    • Kidney stones    • Meniere's disease    • MRSA infection     right ing hernia   • PAD (peripheral artery disease)      Past Surgical History:   Procedure Laterality Date   • APPENDECTOMY     • CARDIAC CATHETERIZATION     • CARDIAC SURGERY  11/22/2016    CABG   • CHEST TUBE INSERTION     • CORONARY ARTERY BYPASS GRAFT     • GALLBLADDER SURGERY     • INCISION AND DRAINAGE ABSCESS     • INGUINAL HERNIA REPAIR Bilateral    • LUMBAR LAMINECTOMY     • STERNAL REWIRING N/A 12/19/2017     "Procedure: REPAIR STERNAL DEHISCENCE WITH STERNAL DEBRIDEMENT  Need plate and screws;  Surgeon: Homero Sutton MD;  Location: [facility] OR;  Service:    • UMBILICAL HERNIA REPAIR       History reviewed. No pertinent family history.  Social History   Substance Use Topics   • Smoking status: Former Smoker     Years: 40.00     Quit date: 1/3/2017   • Smokeless tobacco: Never Used   • Alcohol use No       (Not in a hospital admission)    vancomycin 20 mg/kg Intravenous Once     Allergies:  Amiodarone; Contrast dye; Iodine; Multivitamins; Proton pump inhibitors; Shellfish-derived products; Dexamethasone; Livalo [pitavastatin]; Plavix [clopidogrel bisulfate]; Statins; Tetracyclines & related; and Penicillins    Objective      Vital Signs  Temp:  [98 °F (36.7 °C)] 98 °F (36.7 °C)  Heart Rate:  [72-92] 92  Resp:  [18-20] 20  BP: (154-182)/(73-77) 154/73    Flowsheet Rows         First Filed Value    Admission Height  172.7 cm (68\") Documented at 01/05/2018 1842    Admission Weight  101 kg (223 lb) Documented at 01/05/2018 1842        172.7 cm (68\")    Physical Exam   Constitutional: He is oriented to person, place, and time. He is active and cooperative. He does not appear ill. No distress.   HENT:   Head: Atraumatic.   Right Ear: Hearing normal.   Left Ear: Hearing normal.   Nose: No nasal deformity. No epistaxis.   Mouth/Throat: He does not have dentures. Normal dentition.   Eyes: Conjunctivae and lids are normal. Right pupil is round and reactive. Left pupil is round and reactive.   Neck: No JVD present. Carotid bruit is not present. No tracheal deviation present. No thyroid mass and no thyromegaly present.   Cardiovascular: Normal rate and regular rhythm.    No murmur heard.  Pulses:       Carotid pulses are 2+ on the right side, and 2+ on the left side.       Radial pulses are 2+ on the right side, and 2+ on the left side.        Dorsalis pedis pulses are 2+ on the right side, and 2+ on the left side.        " Posterior tibial pulses are 2+ on the right side, and 2+ on the left side.   Fluctuant swelling at incision top to bottom.  No erythema or induration.  Sternum stable.   Pulmonary/Chest: Effort normal and breath sounds normal.   Abdominal: Soft. He exhibits no distension and no mass. There is no splenomegaly or hepatomegaly. There is no tenderness.   Musculoskeletal: He exhibits no deformity.   Gait normal.    Lymphadenopathy:     He has no cervical adenopathy.        Right: No supraclavicular adenopathy present.        Left: No supraclavicular adenopathy present.   Neurological: He is alert and oriented to person, place, and time. He has normal strength.   Skin: Skin is warm and dry. No cyanosis or erythema. No pallor.   No venous staining   Psychiatric: He has a normal mood and affect. His speech is normal. Judgment and thought content normal.       Results Review:           Assessment:  (Fluid collection peristernal  Recent sternal plating  Upper respiratory infection on oral antibiotics.).     Plan:   (Admit telemetry  IV Vancomycin, Zosyn  Peristernal fluid aspirate, if infected fluild  May require open drainage, wound vac, referral to plastic surgery for muscle flap closure.).     suparasternal fluid collection drained with 18ga needle 1%lidocaine.  100ml dark bloody fluid removed. Sent for cultures.  Likely hemorrhage from excessive cough from URI.    Recommended regular physical activity, progressive walking program.  Continue current medications as directed.    Thank you for the opportunity to participate in this patient's care.    Copy to primary care provider.    EMR Dragon/Transcription disclaimer:   Much of this encounter note is an electronic transcription/translation of spoken language to printed text. The electronic translation of spoken language may permit erroneous, or at times, nonsensical words or phrases to be inadvertently transcribed; Although I have reviewed the note for such errors, some may  still exist.      I discussed the patients findings and my recommendations with Dr David Sutton MD  01/05/18  10:17 PM

## 2018-01-07 LAB
GLUCOSE BLDC GLUCOMTR-MCNC: 151 MG/DL (ref 70–130)
GLUCOSE BLDC GLUCOMTR-MCNC: 157 MG/DL (ref 70–130)
GLUCOSE BLDC GLUCOMTR-MCNC: 180 MG/DL (ref 70–130)
GLUCOSE BLDC GLUCOMTR-MCNC: 261 MG/DL (ref 70–130)

## 2018-01-07 PROCEDURE — 63710000001 INSULIN ASPART PER 5 UNITS: Performed by: THORACIC SURGERY (CARDIOTHORACIC VASCULAR SURGERY)

## 2018-01-07 PROCEDURE — G0378 HOSPITAL OBSERVATION PER HR: HCPCS

## 2018-01-07 PROCEDURE — 82962 GLUCOSE BLOOD TEST: CPT

## 2018-01-07 PROCEDURE — 25010000002 LEVOFLOXACIN PER 250 MG: Performed by: THORACIC SURGERY (CARDIOTHORACIC VASCULAR SURGERY)

## 2018-01-07 PROCEDURE — 99024 POSTOP FOLLOW-UP VISIT: CPT | Performed by: THORACIC SURGERY (CARDIOTHORACIC VASCULAR SURGERY)

## 2018-01-07 RX ADMIN — OXYCODONE HYDROCHLORIDE AND ACETAMINOPHEN 1000 MG: 500 TABLET ORAL at 08:13

## 2018-01-07 RX ADMIN — INSULIN ASPART 64 UNITS: 100 INJECTION, SUSPENSION SUBCUTANEOUS at 08:13

## 2018-01-07 RX ADMIN — ASPIRIN 325 MG: 325 TABLET, COATED ORAL at 08:12

## 2018-01-07 RX ADMIN — INSULIN ASPART 12 UNITS: 100 INJECTION, SOLUTION INTRAVENOUS; SUBCUTANEOUS at 12:19

## 2018-01-07 RX ADMIN — HYDROCODONE BITARTRATE AND ACETAMINOPHEN 1 TABLET: 10; 325 TABLET ORAL at 16:27

## 2018-01-07 RX ADMIN — INSULIN ASPART 64 UNITS: 100 INJECTION, SUSPENSION SUBCUTANEOUS at 17:08

## 2018-01-07 RX ADMIN — HYDROCODONE BITARTRATE AND ACETAMINOPHEN 1 TABLET: 10; 325 TABLET ORAL at 00:35

## 2018-01-07 RX ADMIN — HYDROCHLOROTHIAZIDE 25 MG: 25 TABLET ORAL at 08:12

## 2018-01-07 RX ADMIN — DILTIAZEM HYDROCHLORIDE 180 MG: 180 CAPSULE, COATED, EXTENDED RELEASE ORAL at 20:37

## 2018-01-07 RX ADMIN — INSULIN ASPART 4 UNITS: 100 INJECTION, SOLUTION INTRAVENOUS; SUBCUTANEOUS at 17:07

## 2018-01-07 RX ADMIN — LEVOFLOXACIN 750 MG: 5 INJECTION, SOLUTION INTRAVENOUS at 22:34

## 2018-01-07 RX ADMIN — HYDROCODONE BITARTRATE AND ACETAMINOPHEN 1 TABLET: 10; 325 TABLET ORAL at 08:15

## 2018-01-07 RX ADMIN — INSULIN ASPART 4 UNITS: 100 INJECTION, SOLUTION INTRAVENOUS; SUBCUTANEOUS at 20:38

## 2018-01-07 NOTE — PROGRESS NOTES
"CTVS DAILY NOTE     LOS: 1 day   Peristernal hemorrhage    Patient Care Team:  Nick Modi MD as PCP - General    Chief Complaint: chest pain    Subjective:  Symptoms:  Improved.  He reports chest pain.    Diet:  Adequate intake.    Activity level: Returning to normal.    Pain:  He reports pain is improving.  Pain is well controlled.        Vital Signs  Temp:  [97.4 °F (36.3 °C)-98.2 °F (36.8 °C)] 97.6 °F (36.4 °C)  Heart Rate:  [62-69] 62  Resp:  [18] 18  BP: (117-152)/(55-72) 117/55  Body mass index is 33.75 kg/(m^2).    Intake/Output Summary (Last 24 hours) at 01/07/18 1049  Last data filed at 01/07/18 0400   Gross per 24 hour   Intake              630 ml   Output                0 ml   Net              630 ml          Wt Readings from Last 3 Encounters:   01/07/18 101 kg (222 lb)   01/02/18 101 kg (223 lb 1.6 oz)   12/28/17 102 kg (224 lb)       Objective:  General Appearance:  Comfortable and in no acute distress.    Vital signs: (most recent): Blood pressure 117/55, pulse 62, temperature 97.6 °F (36.4 °C), temperature source Temporal Artery , resp. rate 18, height 172.7 cm (68\"), weight 101 kg (222 lb), SpO2 97 %.  Vital signs are normal.    Lungs:  Normal effort.  Breath sounds clear to auscultation.    Heart: Normal rate.  Regular rhythm.    Chest: (Stable fluid peristernal fluid collection  No erythema induration)  Pulses: Distal pulses are intact.    Neurological: Patient is alert and oriented to person, place and time.            Incisions:  Clean and dry.    Results Review:      WBC No results found for: WBCS   HGB Hemoglobin   Date/Time Value Ref Range Status   01/05/2018 1955 14.9 13.7 - 17.3 g/dL Final      HCT Hematocrit   Date/Time Value Ref Range Status   01/05/2018 1955 43.9 39.0 - 49.0 % Final      Platlets No results found for: LABPLAT     PT/INR:  No results found for: PROTIME/No results found for: INR    Sodium Sodium   Date/Time Value Ref Range Status   01/05/2018 1955 138 137 - 145 " mmol/L Final      Potassium Potassium   Date/Time Value Ref Range Status   01/05/2018 1955 4.3 3.5 - 5.1 mmol/L Final      Chloride Chloride   Date/Time Value Ref Range Status   01/05/2018 1955 99 95 - 110 mmol/L Final      Bicarbonate No results found for: PLASMABICARB   BUN BUN   Date/Time Value Ref Range Status   01/05/2018 1955 30 (H) 7 - 21 mg/dL Final      Creatinine Creatinine   Date/Time Value Ref Range Status   01/05/2018 1955 0.92 0.70 - 1.30 mg/dL Final      Calcium Calcium   Date/Time Value Ref Range Status   01/05/2018 1955 9.2 8.4 - 10.2 mg/dL Final      Magnesium No results found for: MG     Imaging Results (last 72 hours)     Procedure Component Value Units Date/Time    XR Chest 2 View [[id number]] Collected:  01/05/18 1932     Updated:  01/05/18 1956    Narrative:         [facility] [place]    Patient Name: PALOMO SPEARS    ORDERING: MARLYN FLORES     ATTENDING: MARLYN FLORES     REFERRING: MARLYN FLORES    -----------------------    PROCEDURE: Two-view chest    COMPARISON: 1/2/2018    HISTORY: chest pain    FINDINGS: Frontal and lateral views of the chest are obtained.     Devices: None    Lungs/Pleura: There is similar mild asymmetrical elevation of the  left diaphragm and minimal left basilar scarring. No  consolidation, effusion, or pneumothorax.    Cardiomediastinal Structures: The heart size and mediastinal  contours within limits of normal. The trachea is midline.     Skeletal Structures: No acute findings. Redemonstration of  postprocedural changes of the sternum. No free air beneath the  diaphragm.      Impression:       No acute pulmonary or pleural finding.    Electronically signed by:  Josue Mondragon MD  1/5/2018 7:55 PM CST  Workstation: [id number]    CT Chest Without Contrast [[id number]] Collected:  01/05/18 1950     Updated:  01/05/18 2027    Narrative:       PROCEDURE: CT CHEST WO CONTRAST    INDICATION: Pain    HISTORY: Sternal revision    COMPARISON: 10/18/2017      TECHNIQUE:    - reconstructions: Axial, coronal, sagittal    - contrast:  oral:  None                       intravenous: None    This exam was performed according to our departmental  dose-optimization program, which includes automated exposure  control, adjustment of the mA and/or kV according to patient size  and/or use of iterative reconstruction technique.  DLP is 556.8    FINDINGS:    PULMONARY PARENCHYMA:      - air spaces: Negative    - interstitium: Grossly within normal limits for age    - misc: No pulmonary nodules or mass     MEDIASTINUM / SUNDEEP:      - heart: Normal size, no pericardial fluid    - aorta/great vessels: Normal caliber and configuration for  age. Atherosclerotic vascular calcification present    - misc: No mediastinal mass / significant adenopathy     PLEURAL COMPARTMENT:      - misc: No pleural fluid or mass        MISC:      - inferior neck: Negative    - osseous/body wall: A low-attenuation collection along the  sternum both anterior and retrosternal measuring approximately  6.3 x 4.2 x 14 cm is present, which could represent postoperative  change, though abscess would be difficult to exclude in this  noncontrast examination. Patient appears to be status post  surgical revision    - subdiaphramatic: As visualized, limited, grossly unremarkable       Impression:       Appears to be status post sternal revision.  Low-attenuation collection both anterior to the sternum and  retrosternal, from the proximal sternum 2 inferior to the xiphoid  as above. This could represent postoperative change, but abscess  would be difficult to exclude in this noncontrast examination.  Follow-up suggested as clinically warranted.    Electronically signed by:  Carol Ayala MD  1/5/2018 8:26 PM CST  Workstation: [id number]              aspirin 325 mg Oral Daily   diltiaZEM  mg Oral Nightly   famotidine 40 mg Oral BID   hydrochlorothiazide 25 mg Oral Daily   insulin aspart 0-24 Units Subcutaneous 4x Daily  AC & at Bedtime   insulin aspart prot-insulin aspart 64 Units Subcutaneous BID With Meals   levoFLOXacin 750 mg Intravenous Q24H   ascorbic acid 1,000 mg Oral Daily            Patient Active Problem List   Diagnosis Code   • Follow-up surgery care Z09   • Coronary artery disease involving native coronary artery of native heart with unstable angina pectoris I25.110   • Dyslipidemia E78.5   • Dyspnea on exertion R06.09   • Left lower lobe pneumonia J18.1   • S/P CABG (coronary artery bypass graft) Z95.1   • Essential hypertension I10   • Tobacco abuse Z72.0   • 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     Plan:   (Continue IV abx  Await final cultures  Plan open drainage if cultures positive or new symptoms.).       Homero Sutton MD  01/07/18  10:49 AM

## 2018-01-07 NOTE — PLAN OF CARE
Problem: Patient Care Overview (Adult)  Goal: Plan of Care Review  Outcome: Ongoing (interventions implemented as appropriate)   01/07/18 0333   Coping/Psychosocial Response Interventions   Plan Of Care Reviewed With patient   Patient Care Overview   Progress improving     Goal: Adult Individualization and Mutuality  Outcome: Ongoing (interventions implemented as appropriate)    Goal: Discharge Needs Assessment  Outcome: Ongoing (interventions implemented as appropriate)   01/07/18 0333   Discharge Needs Assessment   Discharge Disposition still a patient       Problem: Infection, Risk/Actual (Adult)  Goal: Identify Related Risk Factors and Signs and Symptoms  Outcome: Ongoing (interventions implemented as appropriate)   01/07/18 0333   Infection, Risk/Actual   Infection, Risk/Actual: Related Risk Factors surgery/procedure   Signs and Symptoms (Infection, Risk/Actual) lab value changes;pain     Goal: Infection Prevention/Resolution  Outcome: Ongoing (interventions implemented as appropriate)   01/07/18 0333   Infection, Risk/Actual (Adult)   Infection Prevention/Resolution making progress toward outcome       Problem: Pain, Acute (Adult)  Goal: Identify Related Risk Factors and Signs and Symptoms  Outcome: Ongoing (interventions implemented as appropriate)   01/06/18 1436 01/07/18 0333   Pain, Acute   Related Risk Factors (Acute Pain) surgery --    Signs and Symptoms (Acute Pain) --  verbalization of pain descriptors     Goal: Acceptable Pain Control/Comfort Level  Outcome: Ongoing (interventions implemented as appropriate)   01/07/18 0333   Pain, Acute (Adult)   Acceptable Pain Control/Comfort Level achieves outcome

## 2018-01-08 VITALS
RESPIRATION RATE: 20 BRPM | BODY MASS INDEX: 33.65 KG/M2 | TEMPERATURE: 96.2 F | HEART RATE: 107 BPM | OXYGEN SATURATION: 98 % | DIASTOLIC BLOOD PRESSURE: 68 MMHG | WEIGHT: 222 LBS | SYSTOLIC BLOOD PRESSURE: 110 MMHG | HEIGHT: 68 IN

## 2018-01-08 LAB
BASOPHILS # BLD AUTO: 0.05 10*3/MM3 (ref 0–0.2)
BASOPHILS NFR BLD AUTO: 0.3 % (ref 0–2)
DEPRECATED RDW RBC AUTO: 40.7 FL (ref 35.1–43.9)
EOSINOPHIL # BLD AUTO: 0.16 10*3/MM3 (ref 0–0.7)
EOSINOPHIL NFR BLD AUTO: 1 % (ref 0–7)
ERYTHROCYTE [DISTWIDTH] IN BLOOD BY AUTOMATED COUNT: 12.9 % (ref 11.5–14.5)
GLUCOSE BLDC GLUCOMTR-MCNC: 108 MG/DL (ref 70–130)
HCT VFR BLD AUTO: 46.8 % (ref 39–49)
HGB BLD-MCNC: 15.9 G/DL (ref 13.7–17.3)
HOLD SPECIMEN: NORMAL
HOLD SPECIMEN: NORMAL
IMM GRANULOCYTES # BLD: 0.29 10*3/MM3 (ref 0–0.02)
IMM GRANULOCYTES NFR BLD: 1.8 % (ref 0–0.5)
LYMPHOCYTES # BLD AUTO: 2.1 10*3/MM3 (ref 0.6–4.2)
LYMPHOCYTES NFR BLD AUTO: 13 % (ref 10–50)
MCH RBC QN AUTO: 29.4 PG (ref 26.5–34)
MCHC RBC AUTO-ENTMCNC: 34 G/DL (ref 31.5–36.3)
MCV RBC AUTO: 86.7 FL (ref 80–98)
MONOCYTES # BLD AUTO: 1.16 10*3/MM3 (ref 0–0.9)
MONOCYTES NFR BLD AUTO: 7.2 % (ref 0–12)
NEUTROPHILS # BLD AUTO: 12.41 10*3/MM3 (ref 2–8.6)
NEUTROPHILS NFR BLD AUTO: 76.7 % (ref 37–80)
PLATELET # BLD AUTO: 264 10*3/MM3 (ref 150–450)
PMV BLD AUTO: 9.2 FL (ref 8–12)
RBC # BLD AUTO: 5.4 10*6/MM3 (ref 4.37–5.74)
WBC NRBC COR # BLD: 16.17 10*3/MM3 (ref 3.2–9.8)

## 2018-01-08 PROCEDURE — 85025 COMPLETE CBC W/AUTO DIFF WBC: CPT | Performed by: THORACIC SURGERY (CARDIOTHORACIC VASCULAR SURGERY)

## 2018-01-08 PROCEDURE — 99024 POSTOP FOLLOW-UP VISIT: CPT | Performed by: NURSE PRACTITIONER

## 2018-01-08 PROCEDURE — 63710000001 INSULIN ASPART PER 5 UNITS: Performed by: THORACIC SURGERY (CARDIOTHORACIC VASCULAR SURGERY)

## 2018-01-08 PROCEDURE — 82962 GLUCOSE BLOOD TEST: CPT

## 2018-01-08 RX ORDER — LEVOFLOXACIN 500 MG/1
500 TABLET, FILM COATED ORAL DAILY
Qty: 5 TABLET | Refills: 0 | Status: SHIPPED | OUTPATIENT
Start: 2018-01-08 | End: 2018-01-15

## 2018-01-08 RX ADMIN — INSULIN ASPART 64 UNITS: 100 INJECTION, SUSPENSION SUBCUTANEOUS at 07:46

## 2018-01-08 RX ADMIN — OXYCODONE HYDROCHLORIDE AND ACETAMINOPHEN 1000 MG: 500 TABLET ORAL at 07:40

## 2018-01-08 RX ADMIN — ASPIRIN 325 MG: 325 TABLET, COATED ORAL at 07:41

## 2018-01-08 RX ADMIN — HYDROCHLOROTHIAZIDE 25 MG: 25 TABLET ORAL at 07:40

## 2018-01-08 NOTE — DISCHARGE SUMMARY
CVTS DISCHARGE SUMMARY  Date of Admission: 1/5/2018  6:45 PM  Date of Discharge:  1/8/2018    Admission Diagnosis:   Abscess of Chest  Sternal Manubrial dissociation with nonunion    Discharge Diagnosis:   Abscess of Chest  Sternal Manubrial dissociation with nonunion    Consults:   Consults     Date and Time Order Name Status Description    1/5/2018 2159 Vascular (on-call MD unless specified) Completed           Procedures Performed: Incision/Drainage suprasternal fluid collection: 100ml dark bloody fluid       Discharge Medications   [facility] Medication Instructions ARI:[id number]    Printed on:01/08/18 1024   Medication Information                      ascorbic acid (VITAMIN C) 1000 MG tablet  Take 1,000 mg by mouth 2 (Two) Times a Day.             aspirin 325 MG tablet  Take 325 mg by mouth Daily.             Biotin 5000 MCG tablet  Take 5,000 mcg by mouth Daily. 2 tablets             Coenzyme Q10 (COQ10) 100 MG capsule  Take 1 tablet by mouth Daily.             diltiaZEM CD (CARTIA XT) 180 MG 24 hr capsule  Take 180 mg by mouth Every Night.             diphenhydrAMINE (BENADRYL) 25 mg capsule  Take 25 mg by mouth Every 6 (Six) Hours As Needed for Itching.             hydrochlorothiazide (HYDRODIURIL) 25 MG tablet  Take 25 mg by mouth Daily.             HYDROcodone-acetaminophen (NORCO)  MG per tablet  Take 1 tablet by mouth Every 4 (Four) Hours As Needed for Moderate Pain  (Surgical Pain).             insulin NPH (HUMULIN N) 100 UNIT/ML injection  Inject 42 Units under the skin 2 (Two) Times a Day.             levoFLOXacin (LEVAQUIN) 500 MG tablet  Take 1 tablet by mouth Daily.             NOVOLIN R 100 UNIT/ML injection  Inject 22 Units as directed 2 (Two) Times a Day.             Pseudoeph-Doxylamine-DM-APAP (NYQUIL PO)  Take  by mouth.             raNITIdine (ZANTAC) 300 MG tablet  Take 300 mg by mouth 2 (Two) Times a Day.               Medical Management: Reviewed risks, benefits,  and habit forming potential and weaning from narcotic medication. Patient understands and has prescription available at home.      Discharge Diet:   Diet Instructions     Diet: Cardiac, Consistent Carbohydrate       Discharge Diet:   Cardiac  Consistent Carbohydrate                    Discharge Disposition: Home in stable condition with follow up appointments with CVTS Nurse Practitioner 1 week, Dr. Sutton/Alina 6 weeks, Cardiology/PCP as scheduled.     History of Present Illness/Hospital Course:   57yr man with CAD with PCI 2012, DM, DLP.  swelling incision site noted today.  He underwent CABG X 4  11/22/16 with unremarkable recovery and dc home on POD 3. He returned to hospital with SOA, Lg Left Hemothorax was drained with CT placement approx 4L total. superficial sternal wound infection. Remained stable, mild anemia. Plavix was stopped as he was allergic.  Returns today as he called requesting to be seen concerning sternal popping and movement with discomfort.  Has increased over last few weeks.  He elected to proceed with surgical intervention.  He returned to [facility] and underwent sternal debridement, revision, plating which he tolerated well.  Was discharged home on POD 2.  Returns today in  for remainder of staple removal   Provena wound system completed therapy 12/27/17 and was removed.  Call 12/30/17 concerning cough, yellow sputum with lo grade fever.  Abx started.        Recent history:  11/2016: PFT: FEV1 31% Severe Restrictive lung disease.  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    12/19/17  Sternal revision and Repair with Sternal Plates.  "  01/02/18  Chest xray:  Appliance intact  Left atelectasis   1/5/2018 CT Chest:  Mild to moderate fluid collection anterior sternum.  3/4 plates intact.  Inferior plates appears to have pulled thru RIGHT hemisternum.     She was admitted for incision/drainage of suprasternal fluid collection.  100 ml dark bloody fluid obtained and sent for culture. Likely hemorrhage from excessive cough from URI. Cultures at 48 hrs remain negative. Emperic Levaquin administered IV. Will continue at home PO x 10 days therapy. No signs of infection currently. Stable for dc home with follow up appointments.     Vital Signs  Temp:  [96.2 °F (35.7 °C)-97.3 °F (36.3 °C)] 96.2 °F (35.7 °C)  Heart Rate:  [71-83] 75  Resp:  [18] 18  BP: ()/(57-72) 115/66  Last 3 weights    01/05/18  1842 01/06/18  0016 01/07/18  0500   Weight: 101 kg (223 lb) 100 kg (220 lb 9.6 oz) 101 kg (222 lb)       Pertinent Test Results:  Lab Results   Component Value Date    WBC 16.17 (H) 01/08/2018    HGB 15.9 01/08/2018    HCT 46.8 01/08/2018    MCV 86.7 01/08/2018     01/08/2018     Lab Results   Component Value Date    GLUCOSE 113 (H) 01/05/2018    BUN 30 (H) 01/05/2018    CREATININE 0.92 01/05/2018    EGFRIFNONA 84 01/05/2018    BCR 32.6 (H) 01/05/2018    K 4.3 01/05/2018    CO2 28.0 01/05/2018    CALCIUM 9.2 01/05/2018    ALBUMIN 4.20 10/26/2017    LABIL2 1.6 10/26/2017    AST 26 10/26/2017    ALT 50 10/26/2017       Physical Exam:  Physical Exam   General Appearance:  Comfortable and in no acute distress.    Vital signs: (most recent): Blood pressure 115/66, pulse 75, temperature 96.2 °F (35.7 °C), temperature source Oral, resp. rate 18, height 172.7 cm (68\"), weight 101 kg (222 lb), SpO2 97 %.  Vital signs are normal.    Lungs:  Normal effort.  Breath sounds clear to auscultation.    Heart: Normal rate.  Regular rhythm.    Chest: (Stable fluid peristernal fluid collection  No erythema induration)  Pulses: Distal pulses are intact.  "   Neurological: Patient is alert and oriented to person, place and time.        Additional Instructions for the Follow-ups that You Need to Schedule     Discharge Follow-up with Specialty: CT Surgery; 1 Week    As directed    Specialty:  CT Surgery    Follow Up:  1 Week    Follow Up Details:  [facility]. As Scheduled                     Discharge Instructions: Discharge instructions include no heavy lifting anything greater than 10lbs approximately 4 weeks.  No sex or driving for 3-6 weeks. Patient is to continue with Incentive Spirometry 4 times daily while awake at home as well as any prescribed respiratory treatments. Printed information given to the patient with advancement of activities weekly.  Risks and benefits of narcotic medications and weaning postoperatively have been discussed. Clean operative site with antibacterial soap/water, pat dry. Keep open to air unless draining, then may apply dry dressing.  No ointments or creams unless prescribed by provider. Signs and symptoms of infection including drainage from operative site, redness, swelling, with associated fever and/or chills notify Heart and Vascular center immediately for wound check.  Patient verbalizes understanding of discharge instructions, all questions are answered, follow up appointments have been made, they are discharged home in stable condition.         Follow-up Appointments  Future Appointments  Date Time Provider Department Center   1/15/2018 11:00 AM GISSELLE Obando [facility] None   2/15/2018 3:45 PM Gordo Resendez MD [facility] None       Test Results Pending at Discharge   Order Current Status    Blood Culture - Blood, Preliminary result    Blood Culture - Blood, Preliminary result    Body Fluid Culture - Body Fluid, Pleural Cavity Preliminary result                 This document has been electronically signed by GISSELLE George on January 8, 2018 10:25 AM      Time: Discharge 60 min

## 2018-01-08 NOTE — PAYOR COMM NOTE
"  Clinicals for ref # [id number]. Naz Pitts RN Phone: [phone] Fax: [phone]    Palomo Brantley (57 y.o. Male)     Date of Birth Social Security Number Address Home Phone MRN    1960  [address]  [place] [address] [phone] [id number]    Gnosticism Marital Status          Faith Single       Admission Date Admission Type Admitting Provider Attending Provider Department, Room/Bed    1/5/18 Emergency Homero Sutton MD [place], Thomas Mark, MD [address], 302/1    Discharge Date Discharge Disposition Discharge Destination         Home or Self Care             Attending Provider: Homero Sutton MD     Allergies:  Amiodarone, Contrast Dye, Iodine, Multivitamins, Proton Pump Inhibitors, Shellfish-derived Products, Dexamethasone, Livalo [Pitavastatin], Plavix [Clopidogrel Bisulfate], Statins, Tetracyclines & Related, Penicillins    Isolation:  None   Infection:  None   Code Status:  FULL    Ht:  172.7 cm (68\")   Wt:  101 kg (222 lb)    Admission Cmt:  None   Principal Problem:  None                Active Insurance as of 1/5/2018     Primary Coverage     Payor Plan Insurance Group Employer/Plan Group    [id number]     Payor Plan Address Payor Plan Phone Number Effective From Effective To    [address]  6/15/2014     [place] [address]       Subscriber Name Subscriber Birth Date Member ID       PALOMO BRANTLEY 1960 [id number]                 Emergency Contacts      (Rel.) Home Phone Work Phone Mobile Phone    Mary Brantley (Sister) [phone] -- [phone]            Insurance Information                [employer] Phone:     Subscriber: Palomo Brantley Subscriber#: [id number]    Group#: [id number] Precert#:           History & Physical     No notes of this type exist for this encounter.           Emergency Department Notes      Héctor Hernandez MD at 1/5/2018  8:18 PM  " "        Subjective   HPI Comments: 58yo male pmh significant htn/dm2/cad/restrictive lung disease, s/p sternal revision 12.19.2017, presents ED c/o 1d hx sternal swelling/discomfort.  ROS (+) nonproductive cough. Denies fever/chills/soa/abd pain.    Patient is a 57 y.o. male presenting with general illness.   Illness   Severity:  Moderate  Onset quality:  Sudden  Duration:  1 day  Timing:  Constant  Chronicity:  New  Associated symptoms: chest pain and cough        Review of Systems   Constitutional: Negative.    HENT: Negative.    Eyes: Negative.    Respiratory: Positive for cough.    Cardiovascular: Positive for chest pain.   Gastrointestinal: Negative.    Endocrine: Negative.    Genitourinary: Negative.    Musculoskeletal: Negative.    Skin: Negative.        Past Medical History:   Diagnosis Date   • Accident caused by hypodermic needle    • Guerra esophagus    • CAD (coronary artery disease)    • Contact with and (suspected) exposure to potentially hazardous body fluids    • Diabetes mellitus    • GERD (gastroesophageal reflux disease)    • Kidney stones    • Meniere's disease    • MRSA infection     right ing hernia   • PAD (peripheral artery disease)        Allergies   Allergen Reactions   • Amiodarone Anaphylaxis   • Contrast Dye Anaphylaxis   • Iodine Anaphylaxis   • Multivitamins Anaphylaxis     Oyster calcium   • Proton Pump Inhibitors Shortness Of Breath   • Shellfish-Derived Products Anaphylaxis   • Dexamethasone Other (See Comments)     hiccups   • Livalo [Pitavastatin] Swelling   • Plavix [Clopidogrel Bisulfate] Other (See Comments)     Excessive bleeding   • Statins Swelling   • Tetracyclines & Related Other (See Comments)     \"skin falls off\"   • Penicillins Rash       Past Surgical History:   Procedure Laterality Date   • APPENDECTOMY     • CARDIAC CATHETERIZATION     • CARDIAC SURGERY  11/22/2016    CABG   • CHEST TUBE INSERTION     • CORONARY ARTERY BYPASS GRAFT     • GALLBLADDER SURGERY     • " INCISION AND DRAINAGE ABSCESS     • INGUINAL HERNIA REPAIR Bilateral    • LUMBAR LAMINECTOMY     • STERNAL REWIRING N/A 12/19/2017    Procedure: REPAIR STERNAL DEHISCENCE WITH STERNAL DEBRIDEMENT  Need plate and screws;  Surgeon: Homero Sutton MD;  Location: [facility] OR;  Service:    • UMBILICAL HERNIA REPAIR         History reviewed. No pertinent family history.    Social History     Social History   • Marital status: Single     Spouse name: N/A   • Number of children: N/A   • Years of education: N/A     Social History Main Topics   • Smoking status: Former Smoker     Years: 40.00     Quit date: 1/3/2017   • Smokeless tobacco: Never Used   • Alcohol use No   • Drug use: No   • Sexual activity: Defer     Other Topics Concern   • None     Social History Narrative           Objective   Physical Exam   Constitutional: He is oriented to person, place, and time. He appears well-developed and well-nourished.   HENT:   Head: Normocephalic and atraumatic.   Mouth/Throat: Oropharynx is clear and moist.   Eyes: Pupils are equal, round, and reactive to light.   Neck: Neck supple. No JVD present. No tracheal deviation present.   Cardiovascular: Normal rate, regular rhythm, normal heart sounds and intact distal pulses.  Exam reveals no gallop and no friction rub.    No murmur heard.  Pulmonary/Chest: Effort normal and breath sounds normal. No respiratory distress. He has no wheezes. He has no rales. He exhibits tenderness.       Abdominal: Soft. Bowel sounds are normal.   Musculoskeletal: He exhibits no edema.   Lymphadenopathy:     He has no cervical adenopathy.   Neurological: He is alert and oriented to person, place, and time.   Skin: Skin is warm and dry.   Nursing note and vitals reviewed.      Procedures        ED Course  ED Course   Comment By Time   Dr. Sutton consulted. Will admit for further evaluation. Héctor Hernandez MD 01/05 7121      Labs Reviewed   BASIC METABOLIC PANEL - Abnormal; Notable for the  following:        Result Value    Glucose 113 (*)     BUN 30 (*)     BUN/Creatinine Ratio 32.6 (*)     All other components within normal limits   CBC WITH AUTO DIFFERENTIAL - Abnormal; Notable for the following:     WBC 15.10 (*)     Neutrophil % 80.3 (*)     Immature Grans % 1.3 (*)     Neutrophils, Absolute 12.12 (*)     Monocytes, Absolute 1.12 (*)     Immature Grans, Absolute 0.20 (*)     All other components within normal limits   BLOOD CULTURE   BLOOD CULTURE   CBC AND DIFFERENTIAL    Narrative:     The following orders were created for panel order CBC & Differential.  Procedure                               Abnormality         Status                     ---------                               -----------         ------                     CBC Auto Differential[[id number]]        Abnormal            Final result                 Please view results for these tests on the individual orders.     Xr Chest 2 View    Result Date: 1/5/2018  Narrative: [facility] Patient Name: PALOMO SPEARS ORDERING: MARLYN FLORES ATTENDING: MARLYN FLORES REFERRING: MARLYN FLORES ----------------------- PROCEDURE: Two-view chest COMPARISON: 1/2/2018 HISTORY: chest pain FINDINGS: Frontal and lateral views of the chest are obtained. Devices: None Lungs/Pleura: There is similar mild asymmetrical elevation of the left diaphragm and minimal left basilar scarring. No consolidation, effusion, or pneumothorax. Cardiomediastinal Structures: The heart size and mediastinal contours within limits of normal. The trachea is midline. Skeletal Structures: No acute findings. Redemonstration of postprocedural changes of the sternum. No free air beneath the diaphragm.     Impression: No acute pulmonary or pleural finding. Electronically signed by:  Josue Mondragon MD  1/5/2018 7:55 PM CST Workstation: [id number]    Xr Chest 2 View    Result Date: 1/2/2018  Narrative: TWO VIEW CHEST HISTORY: Cough. Postop sternotomy rewiring.  Frontal and lateral films of the chest were obtained. COMPARISON: 12/19/2017 Again revision of sternotomy with internal fixation plate and screw devices. Skin staples have been removed. The lungs are clear of an acute process. The heart is not enlarged. The pulmonary vasculature is not increased. No pleural effusion. No pneumothorax. No acute osseous abnormality.     Impression: CONCLUSION: Again revision of sternotomy with internal fixation plate and screw devices. Skin staples have been removed. The lungs are clear of an acute process. [id number] Electronically signed by:  Tacho Carver MD  1/2/2018 12:34 PM CST Workstation: [employer] [facility] [employer]    Ct Chest Without Contrast    Result Date: 1/5/2018  Narrative: PROCEDURE: CT CHEST WO CONTRAST INDICATION: Pain HISTORY: Sternal revision COMPARISON: 10/18/2017  TECHNIQUE:   - reconstructions: Axial, coronal, sagittal   - contrast:  oral:  None                      intravenous: None This exam was performed according to our departmental dose-optimization program, which includes automated exposure control, adjustment of the mA and/or kV according to patient size and/or use of iterative reconstruction technique. DLP is 556.8 FINDINGS: PULMONARY PARENCHYMA:    - air spaces: Negative   - interstitium: Grossly within normal limits for age   - misc: No pulmonary nodules or mass  MEDIASTINUM / SUNDEEP:    - heart: Normal size, no pericardial fluid   - aorta/great vessels: Normal caliber and configuration for age. Atherosclerotic vascular calcification present   - misc: No mediastinal mass / significant adenopathy  PLEURAL COMPARTMENT:    - misc: No pleural fluid or mass    MISC:    - inferior neck: Negative   - osseous/body wall: A low-attenuation collection along the sternum both anterior and retrosternal measuring approximately 6.3 x 4.2 x 14 cm is present, which could represent postoperative change, though abscess would be difficult to exclude in this noncontrast examination. Patient  appears to be status post surgical revision   - subdiaphramatic: As visualized, limited, grossly unremarkable      Impression: Appears to be status post sternal revision. Low-attenuation collection both anterior to the sternum and retrosternal, from the proximal sternum 2 inferior to the xiphoid as above. This could represent postoperative change, but abscess would be difficult to exclude in this noncontrast examination. Follow-up suggested as clinically warranted. Electronically signed by:  Carol Ayala MD  1/5/2018 8:26 PM CST Workstation: [id number]    Xr Chest 1 View    Result Date: 12/19/2017  Narrative: PORTABLE CHEST HISTORY: Sternal revision. Postoperative study. Portable AP film of the chest was obtained at 3:23 PM. COMPARISON:  October 12, 2017 Revision of the sternotomy with internal fixation plate and screw devices now in place. Midline surgical skin staples. Midline drain. Minimal linear atelectasis lung bases. The heart is not enlarged. The pulmonary vasculature is not increased. No pleural effusion. No pneumothorax. No acute osseous abnormality.     Impression: CONCLUSION: Revision of sternotomy. Minimal linear atelectasis lung bases. [id number] Electronically signed by:  Tacho Carver MD  12/19/2017 3:59 PM CST Workstation: [id number]                [facility]    Final diagnoses:   Abscess of chest            Héctor Hernandez MD  01/05/18 4048       Electronically signed by Héctor Hernandez MD at 1/5/2018  9:59 PM      Radha Otero RN at 1/5/2018 10:18 PM          Report given to CONNIE Loja on 3W.      Radha Otero RN  01/05/18 8312       Electronically signed by Radha Otero RN at 1/5/2018 10:18 PM           Physician Progress Notes (most recent note)      GISSELLE George at 1/8/2018 10:11 AM  Version 1 of 1         [facility]VS DAILY NOTE     LOS: 3 days   Peristernal hemorrhage    Patient Care Team:  Nick Modi MD as PCP - General    Chief Complaint: chest pain    Subjective:  Symptoms:  Improved.   "He reports chest pain.    Diet:  Adequate intake.    Activity level: Returning to normal.    Pain:  He reports pain is improving.  Pain is well controlled.        Vital Signs  Temp:  [96.2 °F (35.7 °C)-97.3 °F (36.3 °C)] 96.2 °F (35.7 °C)  Heart Rate:  [71-83] 75  Resp:  [18] 18  BP: ()/(57-72) 115/66  Body mass index is 33.75 kg/(m^2).  No intake or output data in the 24 hours ending 01/08/18 1011       Wt Readings from Last 3 Encounters:   01/07/18 101 kg (222 lb)   01/02/18 101 kg (223 lb 1.6 oz)   12/28/17 102 kg (224 lb)       Objective:  General Appearance:  Comfortable and in no acute distress.    Vital signs: (most recent): Blood pressure 115/66, pulse 75, temperature 96.2 °F (35.7 °C), temperature source Oral, resp. rate 18, height 172.7 cm (68\"), weight 101 kg (222 lb), SpO2 97 %.  Vital signs are normal.    Lungs:  Normal effort.  Breath sounds clear to auscultation.    Heart: Normal rate.  Regular rhythm.    Chest: (Stable fluid peristernal fluid collection  No erythema induration)  Pulses: Distal pulses are intact.    Neurological: Patient is alert and oriented to person, place and time.            Incisions:  Clean and dry.    Results Review:      WBC No results found for: WBCS   HGB Hemoglobin   Date/Time Value Ref Range Status   01/08/2018 0524 15.9 13.7 - 17.3 g/dL Final   01/05/2018 1955 14.9 13.7 - 17.3 g/dL Final      HCT Hematocrit   Date/Time Value Ref Range Status   01/08/2018 0524 46.8 39.0 - 49.0 % Final   01/05/2018 1955 43.9 39.0 - 49.0 % Final      Platlets No results found for: LABPLAT     PT/INR:  No results found for: PROTIME/No results found for: INR    Sodium Sodium   Date/Time Value Ref Range Status   01/05/2018 1955 138 137 - 145 mmol/L Final      Potassium Potassium   Date/Time Value Ref Range Status   01/05/2018 1955 4.3 3.5 - 5.1 mmol/L Final      Chloride Chloride   Date/Time Value Ref Range Status   01/05/2018 1955 99 95 - 110 mmol/L Final      Bicarbonate No results " found for: PLASMABICARB   BUN BUN   Date/Time Value Ref Range Status   01/05/2018 1955 30 (H) 7 - 21 mg/dL Final      Creatinine Creatinine   Date/Time Value Ref Range Status   01/05/2018 1955 0.92 0.70 - 1.30 mg/dL Final      Calcium Calcium   Date/Time Value Ref Range Status   01/05/2018 1955 9.2 8.4 - 10.2 mg/dL Final      Magnesium No results found for: MG     Imaging Results (last 72 hours)     Procedure Component Value Units Date/Time    XR Chest 2 View [[id number]] Collected:  01/05/18 1932     Updated:  01/05/18 1956    Narrative:         [facility]    Patient Name: PALOMO SPEARS    ORDERING: MARLYN FLORES     ATTENDING: MARLYN FLORES     REFERRING: MARLYN FLORES    -----------------------    PROCEDURE: Two-view chest    COMPARISON: 1/2/2018    HISTORY: chest pain    FINDINGS: Frontal and lateral views of the chest are obtained.     Devices: None    Lungs/Pleura: There is similar mild asymmetrical elevation of the  left diaphragm and minimal left basilar scarring. No  consolidation, effusion, or pneumothorax.    Cardiomediastinal Structures: The heart size and mediastinal  contours within limits of normal. The trachea is midline.     Skeletal Structures: No acute findings. Redemonstration of  postprocedural changes of the sternum. No free air beneath the  diaphragm.      Impression:       No acute pulmonary or pleural finding.    Electronically signed by:  Josue Mondragon MD  1/5/2018 7:55 PM CST  Workstation: [id number]    CT Chest Without Contrast [[id number]] Collected:  01/05/18 1950     Updated:  01/05/18 2027    Narrative:       PROCEDURE: CT CHEST WO CONTRAST    INDICATION: Pain    HISTORY: Sternal revision    COMPARISON: 10/18/2017     TECHNIQUE:    - reconstructions: Axial, coronal, sagittal    - contrast:  oral:  None                       intravenous: None    This exam was performed according to our departmental  dose-optimization program, which includes automated  exposure  control, adjustment of the mA and/or kV according to patient size  and/or use of iterative reconstruction technique.  DLP is 556.8    FINDINGS:    PULMONARY PARENCHYMA:      - air spaces: Negative    - interstitium: Grossly within normal limits for age    - misc: No pulmonary nodules or mass     MEDIASTINUM / SUNDEEP:      - heart: Normal size, no pericardial fluid    - aorta/great vessels: Normal caliber and configuration for  age. Atherosclerotic vascular calcification present    - misc: No mediastinal mass / significant adenopathy     PLEURAL COMPARTMENT:      - misc: No pleural fluid or mass        MISC:      - inferior neck: Negative    - osseous/body wall: A low-attenuation collection along the  sternum both anterior and retrosternal measuring approximately  6.3 x 4.2 x 14 cm is present, which could represent postoperative  change, though abscess would be difficult to exclude in this  noncontrast examination. Patient appears to be status post  surgical revision    - subdiaphramatic: As visualized, limited, grossly unremarkable       Impression:       Appears to be status post sternal revision.  Low-attenuation collection both anterior to the sternum and  retrosternal, from the proximal sternum 2 inferior to the xiphoid  as above. This could represent postoperative change, but abscess  would be difficult to exclude in this noncontrast examination.  Follow-up suggested as clinically warranted.    Electronically signed by:  Carol Ayala MD  1/5/2018 8:26 PM CST  Workstation: [id number]              aspirin 325 mg Oral Daily   diltiaZEM  mg Oral Nightly   famotidine 40 mg Oral BID   hydrochlorothiazide 25 mg Oral Daily   insulin aspart 0-24 Units Subcutaneous 4x Daily AC & at Bedtime   insulin aspart prot-insulin aspart 64 Units Subcutaneous BID With Meals   levoFLOXacin 750 mg Intravenous Q24H   ascorbic acid 1,000 mg Oral Daily            Patient Active Problem List   Diagnosis Code   • Follow-up  surgery care Z09   • Coronary artery disease involving native coronary artery of native heart with unstable angina pectoris I25.110   • Dyslipidemia E78.5   • Dyspnea on exertion R06.09   • Left lower lobe pneumonia J18.1   • S/P CABG (coronary artery bypass graft) Z95.1   • Essential hypertension I10   • Tobacco abuse Z72.0   • 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  Improving.   (Sternal nonunion  Peristernal hemorrhage  Cultures negative to date).     Plan:   Discharge home.  Encourage ambulation.  (DC Home. Keep scheduled F/U with CXR  PO Levaquin  Await final cultures  Plan open drainage if cultures positive or new symptoms.).       GISSELLE George  01/08/18  10:11 AM                 Electronically signed by GISSELLE George at 1/8/2018 10:15 AM           Consult Notes (most recent note)      Homero Sutton MD at 1/5/2018 10:17 PM  Version 3 of 3     Consult Orders:    1. Vascular (on-call MD unless specified) [[id number]] ordered by Homero Sutton MD at 01/05/18 5743                CVTS CONSULTATION    Patient Care Team:  Nick Modi MD as PCP - General    Chief complaint: chest swelling    Subjective   57yr man with CAD with PCI 2012, DM, DLP.   swelling incision site noted today.  He underwent CABG X 4  11/22/16 with unremarkable recovery and dc home on POD 3. He returned to hospital with SOA, Lg Left Hemothorax was drained with CT placement approx 4L total. superficial sternal wound infection. Remained stable, mild anemia. Plavix was stopped as he was allergic.  Returns today as he called requesting to be seen concerning sternal popping and movement with discomfort.  Has increased over last few weeks.  He elected to proceed with surgical intervention.  [facility], revision, plating  which he tolerated well.  Was discharged home on POD 2.  Returns today in  for remainder of staple removal   Provena wound system completed therapy 12/27/17 and was removed.  Call 12/30/17 concerning cough, yellow sputum with lo grade fever.   Abx started.       Recent history:  11/2016: PFT: FEV1 31% Severe Restrictive lung disease.  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    12/19/17  Sternal revision and Repair with Sternal Plates.   01/02/18  Chest xray:  Appliance intact  Left atelectasis   1/5/2018 CT Chest:  Mild to moderate fluid collection anterior sternum.  3/4 plates intact.  Inferior plates appears to have pulled thru RIGHT hemisternum.    The following portions of the patient's history were reviewed and updated as appropriate: allergies, current medications, past family history, past medical history, past social history, past surgical history and problem list.  Recent images independently reviewed.  Available laboratory values reviewed.    Review of Systems   Constitutional: Positive for fatigue. Negative for activity change, appetite change, fever and unexpected weight change.   HENT: Negative for nosebleeds, sore throat, trouble swallowing and voice change.    Eyes: Negative for photophobia, pain and redness.   Respiratory: Positive for cough and shortness of breath. Negative for chest tightness and stridor.    Cardiovascular: Positive for chest pain. Negative for palpitations and leg swelling.   Gastrointestinal: Negative for abdominal pain, constipation, nausea and vomiting.   Endocrine: Negative for cold intolerance and heat intolerance.    Musculoskeletal: Negative for arthralgias, back pain and neck pain.   Skin: Negative for pallor, rash and wound.   Neurological: Negative for dizziness, seizures, syncope, numbness and headaches.   Hematological: Negative for adenopathy. Does not bruise/bleed easily.   Psychiatric/Behavioral: Negative for behavioral problems, confusion and hallucinations. The patient is not nervous/anxious.         Past Medical History:   Diagnosis Date   • Accident caused by hypodermic needle    • Guerra esophagus    • CAD (coronary artery disease)    • Contact with and (suspected) exposure to potentially hazardous body fluids    • Diabetes mellitus    • GERD (gastroesophageal reflux disease)    • Kidney stones    • Meniere's disease    • MRSA infection     right ing hernia   • PAD (peripheral artery disease)      Past Surgical History:   Procedure Laterality Date   • APPENDECTOMY     • CARDIAC CATHETERIZATION     • CARDIAC SURGERY  11/22/2016    CABG   • CHEST TUBE INSERTION     • CORONARY ARTERY BYPASS GRAFT     • GALLBLADDER SURGERY     • INCISION AND DRAINAGE ABSCESS     • INGUINAL HERNIA REPAIR Bilateral    • LUMBAR LAMINECTOMY     • STERNAL REWIRING N/A 12/19/2017    Procedure: REPAIR STERNAL DEHISCENCE WITH STERNAL DEBRIDEMENT  Need plate and screws;  Surgeon: Homero Sutton MD;  Location: [facility];  Service:    • UMBILICAL HERNIA REPAIR       History reviewed. No pertinent family history.  Social History   Substance Use Topics   • Smoking status: Former Smoker     Years: 40.00     Quit date: 1/3/2017   • Smokeless tobacco: Never Used   • Alcohol use No       (Not in a hospital admission)    vancomycin 20 mg/kg Intravenous Once     Allergies:  Amiodarone; Contrast dye; Iodine; Multivitamins; Proton pump inhibitors; Shellfish-derived products; Dexamethasone; Livalo [pitavastatin]; Plavix [clopidogrel bisulfate]; Statins; Tetracyclines & related; and Penicillins    Objective      Vital Signs  Temp:  [98 °F (36.7  "°C)] 98 °F (36.7 °C)  Heart Rate:  [72-92] 92  Resp:  [18-20] 20  BP: (154-182)/(73-77) 154/73    Flowsheet Rows         First Filed Value    Admission Height  172.7 cm (68\") Documented at 01/05/2018 1842    Admission Weight  101 kg (223 lb) Documented at 01/05/2018 1842        172.7 cm (68\")    Physical Exam   Constitutional: He is oriented to person, place, and time. He is active and cooperative. He does not appear ill. No distress.   HENT:   Head: Atraumatic.   Right Ear: Hearing normal.   Left Ear: Hearing normal.   Nose: No nasal deformity. No epistaxis.   Mouth/Throat: He does not have dentures. Normal dentition.   Eyes: Conjunctivae and lids are normal. Right pupil is round and reactive. Left pupil is round and reactive.   Neck: No JVD present. Carotid bruit is not present. No tracheal deviation present. No thyroid mass and no thyromegaly present.   Cardiovascular: Normal rate and regular rhythm.    No murmur heard.  Pulses:       Carotid pulses are 2+ on the right side, and 2+ on the left side.       Radial pulses are 2+ on the right side, and 2+ on the left side.        Dorsalis pedis pulses are 2+ on the right side, and 2+ on the left side.        Posterior tibial pulses are 2+ on the right side, and 2+ on the left side.   Fluctuant swelling at incision top to bottom.  No erythema or induration.  Sternum stable.   Pulmonary/Chest: Effort normal and breath sounds normal.   Abdominal: Soft. He exhibits no distension and no mass. There is no splenomegaly or hepatomegaly. There is no tenderness.   Musculoskeletal: He exhibits no deformity.   Gait normal.    Lymphadenopathy:     He has no cervical adenopathy.        Right: No supraclavicular adenopathy present.        Left: No supraclavicular adenopathy present.   Neurological: He is alert and oriented to person, place, and time. He has normal strength.   Skin: Skin is warm and dry. No cyanosis or erythema. No pallor.   No venous staining   Psychiatric: He has " a normal mood and affect. His speech is normal. Judgment and thought content normal.       Results Review:           Assessment:  (Fluid collection peristernal  Recent sternal plating  Upper respiratory infection on oral antibiotics.).     Plan:   (Admit telemetry  IV Vancomycin, Zosyn  Peristernal fluid aspirate, if infected fluild  May require open drainage, wound vac, referral to plastic surgery for muscle flap closure.).     suparasternal fluid collection drained with 18ga needle 1%lidocaine.  100ml dark bloody fluid removed. Sent for cultures.  Likely hemorrhage from excessive cough from URI.    Recommended regular physical activity, progressive walking program.  Continue current medications as directed.    Thank you for the opportunity to participate in this patient's care.    Copy to primary care provider.    EMR Dragon/Transcription disclaimer:   Much of this encounter note is an electronic transcription/translation of spoken language to printed text. The electronic translation of spoken language may permit erroneous, or at times, nonsensical words or phrases to be inadvertently transcribed; Although I have reviewed the note for such errors, some may still exist.      I discussed the patients findings and my recommendations with Dr David Sutton MD  01/05/18  10:17 PM               Electronically signed by Homero Sutton MD at 1/6/2018  9:37 AM      Homero Sutton MD at 1/5/2018 10:17 PM  Version 2 of 3         CVTS CONSULTATION    Patient Care Team:  Nick Modi MD as PCP - General    Chief complaint: chest swelling    Subjective   57yr man with CAD with PCI 2012, DM, DLP.   swelling incision site noted today.  He underwent CABG X 4  11/22/16 with unremarkable recovery and dc home on POD 3. He returned to hospital with SOA, Lg Left Hemothorax was drained with CT placement approx 4L total. superficial sternal wound infection. Remained stable, mild anemia. Plavix  was stopped as he was allergic.  Returns today as he called requesting to be seen concerning sternal popping and movement with discomfort.  Has increased over last few weeks.  He elected to proceed with surgical intervention.  He returned to [facility] and underwent sternal debridement, revision, plating which he tolerated well.  Was discharged home on POD 2.  Returns today in  for remainder of staple removal   Provena wound system completed therapy 12/27/17 and was removed.  Call 12/30/17 concerning cough, yellow sputum with lo grade fever.   Abx started.       Recent history:  11/2016: PFT: FEV1 31% Severe Restrictive lung disease.  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    12/19/17  Sternal revision and Repair with Sternal Plates.   01/02/18  Chest xray:  Appliance intact  Left atelectasis   1/5/2018 CT Chest:  Mild to moderate fluid collection anterior sternum.  3/4 plates intact.  Inferior plates appears to have pulled thru RIGHT hemisternum.    The following portions of the patient's history were reviewed and updated as appropriate: allergies, current medications, past family history, past medical history, past social history, past surgical history and problem list.  Recent images independently reviewed.  Available laboratory values reviewed.    Review of Systems   Constitutional: Positive for fatigue. Negative for activity change, appetite change, fever and unexpected weight change.   HENT: Negative for nosebleeds, sore throat, trouble swallowing and voice change.    Eyes: Negative for photophobia, pain and redness.   Respiratory: Positive for cough and  shortness of breath. Negative for chest tightness and stridor.    Cardiovascular: Positive for chest pain. Negative for palpitations and leg swelling.   Gastrointestinal: Negative for abdominal pain, constipation, nausea and vomiting.   Endocrine: Negative for cold intolerance and heat intolerance.   Musculoskeletal: Negative for arthralgias, back pain and neck pain.   Skin: Negative for pallor, rash and wound.   Neurological: Negative for dizziness, seizures, syncope, numbness and headaches.   Hematological: Negative for adenopathy. Does not bruise/bleed easily.   Psychiatric/Behavioral: Negative for behavioral problems, confusion and hallucinations. The patient is not nervous/anxious.         Past Medical History:   Diagnosis Date   • Accident caused by hypodermic needle    • Guerra esophagus    • CAD (coronary artery disease)    • Contact with and (suspected) exposure to potentially hazardous body fluids    • Diabetes mellitus    • GERD (gastroesophageal reflux disease)    • Kidney stones    • Meniere's disease    • MRSA infection     right ing hernia   • PAD (peripheral artery disease)      Past Surgical History:   Procedure Laterality Date   • APPENDECTOMY     • CARDIAC CATHETERIZATION     • CARDIAC SURGERY  11/22/2016    CABG   • CHEST TUBE INSERTION     • CORONARY ARTERY BYPASS GRAFT     • GALLBLADDER SURGERY     • INCISION AND DRAINAGE ABSCESS     • INGUINAL HERNIA REPAIR Bilateral    • LUMBAR LAMINECTOMY     • STERNAL REWIRING N/A 12/19/2017    Procedure: REPAIR STERNAL DEHISCENCE WITH STERNAL DEBRIDEMENT  Need plate and screws;  Surgeon: Homero Sutton MD;  Location: [facility];  Service:    • UMBILICAL HERNIA REPAIR       History reviewed. No pertinent family history.  Social History   Substance Use Topics   • Smoking status: Former Smoker     Years: 40.00     Quit date: 1/3/2017   • Smokeless tobacco: Never Used   • Alcohol use No       (Not in a hospital admission)    vancomycin 20 mg/kg  "Intravenous Once     Allergies:  Amiodarone; Contrast dye; Iodine; Multivitamins; Proton pump inhibitors; Shellfish-derived products; Dexamethasone; Livalo [pitavastatin]; Plavix [clopidogrel bisulfate]; Statins; Tetracyclines & related; and Penicillins    Objective      Vital Signs  Temp:  [98 °F (36.7 °C)] 98 °F (36.7 °C)  Heart Rate:  [72-92] 92  Resp:  [18-20] 20  BP: (154-182)/(73-77) 154/73    Flowsheet Rows         First Filed Value    Admission Height  172.7 cm (68\") Documented at 01/05/2018 1842    Admission Weight  101 kg (223 lb) Documented at 01/05/2018 1842        172.7 cm (68\")    Physical Exam   Constitutional: He is oriented to person, place, and time. He is active and cooperative. He does not appear ill. No distress.   HENT:   Head: Atraumatic.   Right Ear: Hearing normal.   Left Ear: Hearing normal.   Nose: No nasal deformity. No epistaxis.   Mouth/Throat: He does not have dentures. Normal dentition.   Eyes: Conjunctivae and lids are normal. Right pupil is round and reactive. Left pupil is round and reactive.   Neck: No JVD present. Carotid bruit is not present. No tracheal deviation present. No thyroid mass and no thyromegaly present.   Cardiovascular: Normal rate and regular rhythm.    No murmur heard.  Pulses:       Carotid pulses are 2+ on the right side, and 2+ on the left side.       Radial pulses are 2+ on the right side, and 2+ on the left side.        Dorsalis pedis pulses are 2+ on the right side, and 2+ on the left side.        Posterior tibial pulses are 2+ on the right side, and 2+ on the left side.   Fluctuant swelling at incision top to bottom.  No erythema or induration.  Sternum stable.   Pulmonary/Chest: Effort normal and breath sounds normal.   Abdominal: Soft. He exhibits no distension and no mass. There is no splenomegaly or hepatomegaly. There is no tenderness.   Musculoskeletal: He exhibits no deformity.   Gait normal.    Lymphadenopathy:     He has no cervical adenopathy.    "     Right: No supraclavicular adenopathy present.        Left: No supraclavicular adenopathy present.   Neurological: He is alert and oriented to person, place, and time. He has normal strength.   Skin: Skin is warm and dry. No cyanosis or erythema. No pallor.   No venous staining   Psychiatric: He has a normal mood and affect. His speech is normal. Judgment and thought content normal.       Results Review:           Assessment:  (Fluid collection peristernal  Recent sternal plating  Upper respiratory infection on oral antibiotics.).     Plan:   (Admit telemetry  IV Vancomycin, Zosyn  Peristernal fluid aspirate, if infected fluild  May require open drainage, wound vac, referral to plastic surgery for muscle flap closure.).     suparasternal fluid collection drained with 18ga needle 1%lidocaine.  100ml dark bloody fluid removed. Sent for cultures.    Recommended regular physical activity, progressive walking program.  Continue current medications as directed.    Thank you for the opportunity to participate in this patient's care.    Copy to primary care provider.    EMR Dragon/Transcription disclaimer:   Much of this encounter note is an electronic transcription/translation of spoken language to printed text. The electronic translation of spoken language may permit erroneous, or at times, nonsensical words or phrases to be inadvertently transcribed; Although I have reviewed the note for such errors, some may still exist.      I discussed the patients findings and my recommendations with Dr David Sutton MD  01/05/18  10:17 PM               Electronically signed by Homero Sutton MD at 1/6/2018  9:36 AM      Homero Sutton MD at 1/5/2018 10:17 PM  Version 1 of 3         CVTS CONSULTATION    Patient Care Team:  Nick Modi MD as PCP - General    Chief complaint: chest swelling    Subjective   57yr man with CAD with PCI 2012, DM, DLP.   swelling incision site noted today.  He  underwent CABG X 4  11/22/16 with unremarkable recovery and dc home on POD 3. He returned to hospital with SOA, Lg Left Hemothorax was drained with CT placement approx 4L total. superficial sternal wound infection. Remained stable, mild anemia. Plavix was stopped as he was allergic.  Returns today as he called requesting to be seen concerning sternal popping and movement with discomfort.  Has increased over last few weeks.  He elected to proceed with surgical intervention.  He returned to [facility] and underwent sternal debridement, revision, plating which he tolerated well.  Was discharged home on POD 2.  Returns today in  for remainder of staple removal   Provena wound system completed therapy 12/27/17 and was removed.  Call 12/30/17 concerning cough, yellow sputum with lo grade fever.   Abx started.       Recent history:  11/2016: PFT: FEV1 31% Severe Restrictive lung disease.  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    12/19/17  Sternal revision and Repair with Sternal Plates.   01/02/18  Chest xray:  Appliance intact  Left atelectasis   1/5/2018 CT Chest:  Mild to moderate fluid collection anterior sternum.  3/4 plates intact.  Inferior plates appears to have pulled thru RIGHT hemisternum.    The following portions of the patient's history were reviewed and updated as appropriate: allergies, current medications, past family history, past medical history, past social history, past surgical history and problem list.  Recent images independently reviewed.  Available laboratory values reviewed.    Review of Systems   Constitutional: Positive for fatigue.  Negative for activity change, appetite change, fever and unexpected weight change.   HENT: Negative for nosebleeds, sore throat, trouble swallowing and voice change.    Eyes: Negative for photophobia, pain and redness.   Respiratory: Positive for cough and shortness of breath. Negative for chest tightness and stridor.    Cardiovascular: Positive for chest pain. Negative for palpitations and leg swelling.   Gastrointestinal: Negative for abdominal pain, constipation, nausea and vomiting.   Endocrine: Negative for cold intolerance and heat intolerance.   Musculoskeletal: Negative for arthralgias, back pain and neck pain.   Skin: Negative for pallor, rash and wound.   Neurological: Negative for dizziness, seizures, syncope, numbness and headaches.   Hematological: Negative for adenopathy. Does not bruise/bleed easily.   Psychiatric/Behavioral: Negative for behavioral problems, confusion and hallucinations. The patient is not nervous/anxious.         Past Medical History:   Diagnosis Date   • Accident caused by hypodermic needle    • Guerra esophagus    • CAD (coronary artery disease)    • Contact with and (suspected) exposure to potentially hazardous body fluids    • Diabetes mellitus    • GERD (gastroesophageal reflux disease)    • Kidney stones    • Meniere's disease    • MRSA infection     right ing hernia   • PAD (peripheral artery disease)      Past Surgical History:   Procedure Laterality Date   • APPENDECTOMY     • CARDIAC CATHETERIZATION     • CARDIAC SURGERY  11/22/2016    CABG   • CHEST TUBE INSERTION     • CORONARY ARTERY BYPASS GRAFT     • GALLBLADDER SURGERY     • INCISION AND DRAINAGE ABSCESS     • INGUINAL HERNIA REPAIR Bilateral    • LUMBAR LAMINECTOMY     • STERNAL REWIRING N/A 12/19/2017    Procedure: REPAIR STERNAL DEHISCENCE WITH STERNAL DEBRIDEMENT  Need plate and screws;  Surgeon: Homero Sutton MD;  Location: [facility];  Service:    • UMBILICAL HERNIA REPAIR       History reviewed. No  "pertinent family history.  Social History   Substance Use Topics   • Smoking status: Former Smoker     Years: 40.00     Quit date: 1/3/2017   • Smokeless tobacco: Never Used   • Alcohol use No       (Not in a hospital admission)    vancomycin 20 mg/kg Intravenous Once     Allergies:  Amiodarone; Contrast dye; Iodine; Multivitamins; Proton pump inhibitors; Shellfish-derived products; Dexamethasone; Livalo [pitavastatin]; Plavix [clopidogrel bisulfate]; Statins; Tetracyclines & related; and Penicillins    Objective      Vital Signs  Temp:  [98 °F (36.7 °C)] 98 °F (36.7 °C)  Heart Rate:  [72-92] 92  Resp:  [18-20] 20  BP: (154-182)/(73-77) 154/73    Flowsheet Rows         First Filed Value    Admission Height  172.7 cm (68\") Documented at 01/05/2018 1842    Admission Weight  101 kg (223 lb) Documented at 01/05/2018 1842        172.7 cm (68\")    Physical Exam   Constitutional: He is oriented to person, place, and time. He is active and cooperative. He does not appear ill. No distress.   HENT:   Head: Atraumatic.   Right Ear: Hearing normal.   Left Ear: Hearing normal.   Nose: No nasal deformity. No epistaxis.   Mouth/Throat: He does not have dentures. Normal dentition.   Eyes: Conjunctivae and lids are normal. Right pupil is round and reactive. Left pupil is round and reactive.   Neck: No JVD present. Carotid bruit is not present. No tracheal deviation present. No thyroid mass and no thyromegaly present.   Cardiovascular: Normal rate and regular rhythm.    No murmur heard.  Pulses:       Carotid pulses are 2+ on the right side, and 2+ on the left side.       Radial pulses are 2+ on the right side, and 2+ on the left side.        Dorsalis pedis pulses are 2+ on the right side, and 2+ on the left side.        Posterior tibial pulses are 2+ on the right side, and 2+ on the left side.   Fluctuant swelling at incision top to bottom.  No erythema or induration.  Sternum stable.   Pulmonary/Chest: Effort normal and breath " sounds normal.   Abdominal: Soft. He exhibits no distension and no mass. There is no splenomegaly or hepatomegaly. There is no tenderness.   Musculoskeletal: He exhibits no deformity.   Gait normal.    Lymphadenopathy:     He has no cervical adenopathy.        Right: No supraclavicular adenopathy present.        Left: No supraclavicular adenopathy present.   Neurological: He is alert and oriented to person, place, and time. He has normal strength.   Skin: Skin is warm and dry. No cyanosis or erythema. No pallor.   No venous staining   Psychiatric: He has a normal mood and affect. His speech is normal. Judgment and thought content normal.       Results Review:         Assessment/Plan     Active Problems:    Abscess of chest      Assessment:  (Fluid collection peristernal  Recent sternal plating  Upper respiratory infection on oral antibiotics.).     Plan:   (Admit telemetry  IV Vancomycin, Zosyn  Peristernal fluid aspirate, if infected fluild  May require open drainage, wound vac, referral to plastic surgery for muscle flap closure.).       Recommended regular physical activity, progressive walking program.  Continue current medications as directed.  Will Obtain relevant old records.    Thank you for the opportunity to participate in this patient's care.    Copy to primary care provider.    EMR Dragon/Transcription disclaimer:   Much of this encounter note is an electronic transcription/translation of spoken language to printed text. The electronic translation of spoken language may permit erroneous, or at times, nonsensical words or phrases to be inadvertently transcribed; Although I have reviewed the note for such errors, some may still exist.      I discussed the patients findings and my recommendations with Dr David Sutton MD  01/05/18  10:17 PM               Electronically signed by Homero Sutton MD at 1/5/2018 10:28 PM

## 2018-01-08 NOTE — PLAN OF CARE
Problem: Patient Care Overview (Adult)  Goal: Plan of Care Review  Outcome: Ongoing (interventions implemented as appropriate)   01/07/18 1818   Coping/Psychosocial Response Interventions   Plan Of Care Reviewed With patient   Patient Care Overview   Progress improving     Goal: Adult Individualization and Mutuality  Outcome: Ongoing (interventions implemented as appropriate)    Goal: Discharge Needs Assessment  Outcome: Ongoing (interventions implemented as appropriate)      Problem: Infection, Risk/Actual (Adult)  Goal: Identify Related Risk Factors and Signs and Symptoms  Outcome: Outcome(s) achieved Date Met: 01/07/18    Goal: Infection Prevention/Resolution  Outcome: Ongoing (interventions implemented as appropriate)      Problem: Pain, Acute (Adult)  Goal: Identify Related Risk Factors and Signs and Symptoms  Outcome: Outcome(s) achieved Date Met: 01/07/18    Goal: Acceptable Pain Control/Comfort Level  Outcome: Ongoing (interventions implemented as appropriate)

## 2018-01-08 NOTE — PLAN OF CARE
Problem: Patient Care Overview (Adult)  Goal: Plan of Care Review  Outcome: Ongoing (interventions implemented as appropriate)    Goal: Adult Individualization and Mutuality  Outcome: Ongoing (interventions implemented as appropriate)    Goal: Discharge Needs Assessment  Outcome: Ongoing (interventions implemented as appropriate)      Problem: Infection, Risk/Actual (Adult)  Goal: Infection Prevention/Resolution  Outcome: Ongoing (interventions implemented as appropriate)      Problem: Pain, Acute (Adult)  Goal: Acceptable Pain Control/Comfort Level  Outcome: Ongoing (interventions implemented as appropriate)

## 2018-01-08 NOTE — PROGRESS NOTES
"CTVS DAILY NOTE     LOS: 3 days   Peristernal hemorrhage    Patient Care Team:  Nick Modi MD as PCP - General    Chief Complaint: chest pain    Subjective:  Symptoms:  Improved.  He reports chest pain.    Diet:  Adequate intake.    Activity level: Returning to normal.    Pain:  He reports pain is improving.  Pain is well controlled.        Vital Signs  Temp:  [96.2 °F (35.7 °C)-97.3 °F (36.3 °C)] 96.2 °F (35.7 °C)  Heart Rate:  [71-83] 75  Resp:  [18] 18  BP: ()/(57-72) 115/66  Body mass index is 33.75 kg/(m^2).  No intake or output data in the 24 hours ending 01/08/18 1011       Wt Readings from Last 3 Encounters:   01/07/18 101 kg (222 lb)   01/02/18 101 kg (223 lb 1.6 oz)   12/28/17 102 kg (224 lb)       Objective:  General Appearance:  Comfortable and in no acute distress.    Vital signs: (most recent): Blood pressure 115/66, pulse 75, temperature 96.2 °F (35.7 °C), temperature source Oral, resp. rate 18, height 172.7 cm (68\"), weight 101 kg (222 lb), SpO2 97 %.  Vital signs are normal.    Lungs:  Normal effort.  Breath sounds clear to auscultation.    Heart: Normal rate.  Regular rhythm.    Chest: (Stable fluid peristernal fluid collection  No erythema induration)  Pulses: Distal pulses are intact.    Neurological: Patient is alert and oriented to person, place and time.            Incisions:  Clean and dry.    Results Review:      WBC No results found for: WBCS   HGB Hemoglobin   Date/Time Value Ref Range Status   01/08/2018 0524 15.9 13.7 - 17.3 g/dL Final   01/05/2018 1955 14.9 13.7 - 17.3 g/dL Final      HCT Hematocrit   Date/Time Value Ref Range Status   01/08/2018 0524 46.8 39.0 - 49.0 % Final   01/05/2018 1955 43.9 39.0 - 49.0 % Final      Platlets No results found for: LABPLAT     PT/INR:  No results found for: PROTIME/No results found for: INR    Sodium Sodium   Date/Time Value Ref Range Status   01/05/2018 1955 138 137 - 145 mmol/L Final      Potassium Potassium   Date/Time Value Ref " Range Status   01/05/2018 1955 4.3 3.5 - 5.1 mmol/L Final      Chloride Chloride   Date/Time Value Ref Range Status   01/05/2018 1955 99 95 - 110 mmol/L Final      Bicarbonate No results found for: PLASMABICARB   BUN BUN   Date/Time Value Ref Range Status   01/05/2018 1955 30 (H) 7 - 21 mg/dL Final      Creatinine Creatinine   Date/Time Value Ref Range Status   01/05/2018 1955 0.92 0.70 - 1.30 mg/dL Final      Calcium Calcium   Date/Time Value Ref Range Status   01/05/2018 1955 9.2 8.4 - 10.2 mg/dL Final      Magnesium No results found for: MG     Imaging Results (last 72 hours)     Procedure Component Value Units Date/Time    XR Chest 2 View [[id number]] Collected:  01/05/18 1932     Updated:  01/05/18 1956    Narrative:         [facility] [place]    Patient Name: PALOMO SPEARS    ORDERING: MARLYN FLORES     ATTENDING: MARLYN FLORES     REFERRING: MARLYN FLORES    -----------------------    PROCEDURE: Two-view chest    COMPARISON: 1/2/2018    HISTORY: chest pain    FINDINGS: Frontal and lateral views of the chest are obtained.     Devices: None    Lungs/Pleura: There is similar mild asymmetrical elevation of the  left diaphragm and minimal left basilar scarring. No  consolidation, effusion, or pneumothorax.    Cardiomediastinal Structures: The heart size and mediastinal  contours within limits of normal. The trachea is midline.     Skeletal Structures: No acute findings. Redemonstration of  postprocedural changes of the sternum. No free air beneath the  diaphragm.      Impression:       No acute pulmonary or pleural finding.    Electronically signed by:  Josue Mondragon MD  1/5/2018 7:55 PM CST  Workstation: [id number]    CT Chest Without Contrast [[id number]] Collected:  01/05/18 1950     Updated:  01/05/18 2027    Narrative:       PROCEDURE: CT CHEST WO CONTRAST    INDICATION: Pain    HISTORY: Sternal revision    COMPARISON: 10/18/2017     TECHNIQUE:    - reconstructions: Axial, coronal, sagittal    -  contrast:  oral:  None                       intravenous: None    This exam was performed according to our departmental  dose-optimization program, which includes automated exposure  control, adjustment of the mA and/or kV according to patient size  and/or use of iterative reconstruction technique.  DLP is 556.8    FINDINGS:    PULMONARY PARENCHYMA:      - air spaces: Negative    - interstitium: Grossly within normal limits for age    - misc: No pulmonary nodules or mass     MEDIASTINUM / SUNDEEP:      - heart: Normal size, no pericardial fluid    - aorta/great vessels: Normal caliber and configuration for  age. Atherosclerotic vascular calcification present    - misc: No mediastinal mass / significant adenopathy     PLEURAL COMPARTMENT:      - misc: No pleural fluid or mass        MISC:      - inferior neck: Negative    - osseous/body wall: A low-attenuation collection along the  sternum both anterior and retrosternal measuring approximately  6.3 x 4.2 x 14 cm is present, which could represent postoperative  change, though abscess would be difficult to exclude in this  noncontrast examination. Patient appears to be status post  surgical revision    - subdiaphramatic: As visualized, limited, grossly unremarkable       Impression:       Appears to be status post sternal revision.  Low-attenuation collection both anterior to the sternum and  retrosternal, from the proximal sternum 2 inferior to the xiphoid  as above. This could represent postoperative change, but abscess  would be difficult to exclude in this noncontrast examination.  Follow-up suggested as clinically warranted.    Electronically signed by:  Carol Ayala MD  1/5/2018 8:26 PM CST  Workstation: [id number]              aspirin 325 mg Oral Daily   diltiaZEM  mg Oral Nightly   famotidine 40 mg Oral BID   hydrochlorothiazide 25 mg Oral Daily   insulin aspart 0-24 Units Subcutaneous 4x Daily AC & at Bedtime   insulin aspart prot-insulin aspart 64 Units  Subcutaneous BID With Meals   levoFLOXacin 750 mg Intravenous Q24H   ascorbic acid 1,000 mg Oral Daily            Patient Active Problem List   Diagnosis Code   • Follow-up surgery care Z09   • Coronary artery disease involving native coronary artery of native heart with unstable angina pectoris I25.110   • Dyslipidemia E78.5   • Dyspnea on exertion R06.09   • Left lower lobe pneumonia J18.1   • S/P CABG (coronary artery bypass graft) Z95.1   • Essential hypertension I10   • Tobacco abuse Z72.0   • 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  Improving.   (Sternal nonunion  Peristernal hemorrhage  Cultures negative to date).     Plan:   Discharge home.  Encourage ambulation.  (DC Home. Keep scheduled F/U with CXR  PO Levaquin  Await final cultures  Plan open drainage if cultures positive or new symptoms.).       Miryam Lazaro, APRN  01/08/18  10:11 AM

## 2018-01-10 LAB
BACTERIA SPEC AEROBE CULT: NORMAL
BACTERIA SPEC AEROBE CULT: NORMAL

## 2018-01-11 LAB
BACTERIA FLD CULT: NORMAL
GRAM STN SPEC: NORMAL

## 2018-01-15 ENCOUNTER — OFFICE VISIT (OUTPATIENT)
Dept: CARDIAC SURGERY | Facility: CLINIC | Age: 58
End: 2018-01-15

## 2018-01-15 ENCOUNTER — HOSPITAL ENCOUNTER (OUTPATIENT)
Dept: GENERAL RADIOLOGY | Facility: HOSPITAL | Age: 58
Discharge: HOME | End: 2018-01-15
Admitting: NURSE PRACTITIONER

## 2018-01-15 VITALS
HEART RATE: 93 BPM | TEMPERATURE: 98.1 F | BODY MASS INDEX: 33.84 KG/M2 | HEIGHT: 68 IN | SYSTOLIC BLOOD PRESSURE: 131 MMHG | WEIGHT: 223.3 LBS | DIASTOLIC BLOOD PRESSURE: 78 MMHG | OXYGEN SATURATION: 95 %

## 2018-01-15 DIAGNOSIS — M96.89 NONUNION OF STERNUM AFTER STERNOTOMY: ICD-10-CM

## 2018-01-15 DIAGNOSIS — Z09 FOLLOW-UP SURGERY CARE: ICD-10-CM

## 2018-01-15 DIAGNOSIS — Z09 FOLLOW-UP SURGERY CARE: Primary | ICD-10-CM

## 2018-01-15 PROCEDURE — 99024 POSTOP FOLLOW-UP VISIT: CPT | Performed by: NURSE PRACTITIONER

## 2018-01-15 PROCEDURE — 71046 X-RAY EXAM CHEST 2 VIEWS: CPT

## 2018-01-15 NOTE — PROGRESS NOTES
Subjective    Patient ID: Paddy Brantley is a 57 y.o. male is here today in surgical follow up SP Sternal revision and Repair with Sternal Plates.   Chief Complaint   Patient presents with   • Coronary Artery Disease     sternal plating 12/19/2017   Occasional click.  Chest incision discomfort VAS 4  Does not need additional pain pills    PCP Rian Resendez     Coronary Artery Disease   Symptoms include chest pain (surgical ). Pertinent negatives include no dizziness, leg swelling, muscle weakness or shortness of breath.   Wound Check     Carotid Artery Disease   Associated symptoms include chest pain (surgical ). Pertinent negatives include no abdominal pain, anorexia, chills, coughing, fever, numbness or weakness.     Mr Brantley is gentleman  known CAD with PCI 2012, DM, DLP.   He underwent CABG X 4  11/22/16 with unremarkable recovery and dc home on POD 3. He returned to hospital with SOA, Lg Left Hemothorax was drained with CT placement approx 4L total. Remained stable, mild anemia. Plavix was stopped as he was allergic.  Returns today as he called requesting to be seen concerning sternal popping and movement with discomfort.  Has increased over last few weeks.  He elected to proceed with surgical intervention.  He returned to [facility] and underwent said procedure which he tolerated well.  Was discharged home on POD 2.  Returns today in scheduled FU for remainder of staple removal   Provena wound system completed therapy 12/27/17and was removed.  Was doing well until developed painful lump in chest incisional area after coughing.  Readmitted to hospital with hematoma which was aspirated and CS (all neg).  SInce dismissal (1/8/18) no reoccurrence of edema or pain.   No popping or clicking in > 72 hrs.  Pain VAS 4.  Does not need additional pain pills.     Recent history:  11/2016: PFT: FEV1 31% Severe Restrictive lung disease.  11/2016: Carotid duplex: BICA <50%  11/2016: Echo: EF 55%  11/22/16: CABG x4  "LIMA-LAD, SVG-OM, SVG-DX, SVG-PDA, EVH  1/3/17: CXR: LARGE LEFT HEMOTHORAX, CT Guided thoracentesis 500cc  1/4/16: CT placed 2.5L sanginous fluid drained   2/28/17: CXR: Improved Small Left effusion  10/12/17 CXR:  Atelectasis  Sternal wire fracture   10/18/17  CT Chest:  Fracture nonunion midsternal body at 4th sternal wires.    12/19/17  Sternal revision and Repair with Sternal Plates.   01/02/18  Chest xray:  Appliance intact  Left atelectasis   01/05/18 Readmit to hospital with Hematoma of chest wall.  CS neg     The following portions of the patient's history were reviewed and updated as appropriate: allergies, current medications, past family history, past medical history, past social history, past surgical history and problem list.  See HPI     Allergies   Allergen Reactions   • Amiodarone Anaphylaxis   • Contrast Dye Anaphylaxis   • Iodine Anaphylaxis   • Multivitamins Anaphylaxis     Oyster calcium   • Proton Pump Inhibitors Shortness Of Breath   • Shellfish-Derived Products Anaphylaxis   • Dexamethasone Other (See Comments)     hiccups   • Livalo [Pitavastatin] Swelling   • Plavix [Clopidogrel Bisulfate] Other (See Comments)     Excessive bleeding   • Statins Swelling   • Tetracyclines & Related Other (See Comments)     \"skin falls off\"   • Penicillins Rash       Current Outpatient Prescriptions:   •  ascorbic acid (VITAMIN C) 1000 MG tablet, Take 1,000 mg by mouth 2 (Two) Times a Day., Disp: , Rfl:   •  aspirin 325 MG tablet, Take 325 mg by mouth Daily., Disp: , Rfl:   •  Biotin 5000 MCG tablet, Take 5,000 mcg by mouth Daily. 2 tablets, Disp: , Rfl:   •  Coenzyme Q10 (COQ10) 100 MG capsule, Take 1 tablet by mouth Daily., Disp: , Rfl:   •  diltiaZEM CD (CARTIA XT) 180 MG 24 hr capsule, Take 180 mg by mouth Every Night., Disp: , Rfl:   •  diphenhydrAMINE (BENADRYL) 25 mg capsule, Take 25 mg by mouth Every 6 (Six) Hours As Needed for Itching., Disp: , Rfl:   •  hydrochlorothiazide (HYDRODIURIL) 25 MG tablet, " Take 25 mg by mouth Daily., Disp: , Rfl:   •  HYDROcodone-acetaminophen (NORCO)  MG per tablet, Take 1 tablet by mouth Every 4 (Four) Hours As Needed for Moderate Pain  (Surgical Pain)., Disp: 40 tablet, Rfl: 0  •  insulin NPH (HUMULIN N) 100 UNIT/ML injection, Inject 42 Units under the skin 2 (Two) Times a Day., Disp: , Rfl:   •  NOVOLIN R 100 UNIT/ML injection, Inject 22 Units as directed 2 (Two) Times a Day., Disp: , Rfl:   •  raNITIdine (ZANTAC) 300 MG tablet, Take 300 mg by mouth 2 (Two) Times a Day., Disp: , Rfl:     Review of Systems   Constitution: Negative for chills, decreased appetite, fever, weakness and malaise/fatigue.   HENT: Negative for hearing loss, hoarse voice and nosebleeds.    Eyes: Negative for visual disturbance.   Cardiovascular: Positive for chest pain (surgical ). Negative for claudication, cyanosis, dyspnea on exertion and leg swelling.        No clicking or popping > 72 hrs   No edema of incisional area    Respiratory: Negative for cough, hemoptysis, shortness of breath, sputum production and wheezing.    Hematologic/Lymphatic: Negative for bleeding problem. Does not bruise/bleed easily.   Skin: Negative for color change, dry skin and poor wound healing.   Musculoskeletal: Negative for falls, muscle cramps and muscle weakness.   Gastrointestinal: Negative for abdominal pain, anorexia, hematemesis and melena.   Genitourinary: Negative for hematuria.   Neurological: Negative for difficulty with concentration, dizziness, numbness and paresthesias.   Psychiatric/Behavioral: Negative for altered mental status.   Allergic/Immunologic: Negative for hives.        Objective   Physical Exam   Constitutional: He is oriented to person, place, and time. He appears well-nourished.   HENT:   Head: Normocephalic.   Mouth/Throat: Oropharynx is clear and moist.   Eyes: Conjunctivae are normal. Pupils are equal, round, and reactive to light.   Neck: No JVD present.   Cardiovascular: Normal rate,  regular rhythm, normal heart sounds and intact distal pulses.    Sternum stable with cough      Pulmonary/Chest: Effort normal and breath sounds normal. No stridor. He has no wheezes. He has no rales.   Sternum stable with cough and moderate pressure  No edema    Abdominal: Soft. Bowel sounds are normal. There is no tenderness.   Musculoskeletal: He exhibits no edema.   Neurological: He is alert and oriented to person, place, and time.   Skin: Skin is warm and dry. No erythema.   Sternal incision clean and dry. Healing well    Psychiatric: His behavior is normal. Judgment normal.   Nursing note and vitals reviewed.      Vitals:    01/15/18 1059   BP: 131/78   Pulse: 93   Temp: 98.1 °F (36.7 °C)   SpO2: 95%         Assessment/Plan   Independent Review of Radiographic Studies:    01/02/18  Chest xray:  Appliance intact  Left atelectasis     1. 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  PO Pain  Controlled with usual medications.        3.  Follow-up surgery care  Shower daily  No lifting > 4-6 lbs May drive if not taking pain pilles   Follow up as needed unless  Signs and symptoms of infection including drainage from operative site, redness, swelling, with associated fever and/or chills notify [facility] center immediately for wound check.   May return to work with restrictions:  See work excuse

## 2018-01-15 NOTE — PATIENT INSTRUCTIONS
May drive    No lifting >6-8 lbs for 01/20/18  No lifting > 10 l bs 02/20/18  No lifting > 20 lbs 03/20/18

## 2018-03-26 RX ORDER — DILTIAZEM HYDROCHLORIDE 180 MG/1
180 CAPSULE, COATED, EXTENDED RELEASE ORAL NIGHTLY
Qty: 90 CAPSULE | Refills: 1 | Status: SHIPPED | OUTPATIENT
Start: 2018-03-26 | End: 2018-10-03 | Stop reason: SDUPTHER

## 2018-03-26 NOTE — TELEPHONE ENCOUNTER
[facility]  Refill sent to pharmacy via e-scribe.

## 2018-10-03 RX ORDER — DILTIAZEM HYDROCHLORIDE 180 MG/1
180 CAPSULE, EXTENDED RELEASE ORAL NIGHTLY
Qty: 90 CAPSULE | Refills: 0 | Status: SHIPPED | OUTPATIENT
Start: 2018-10-03 | End: 2019-01-15 | Stop reason: SDUPTHER

## 2019-01-15 RX ORDER — DILTIAZEM HYDROCHLORIDE 180 MG/1
180 CAPSULE, EXTENDED RELEASE ORAL NIGHTLY
Qty: 90 CAPSULE | Refills: 0 | Status: SHIPPED | OUTPATIENT
Start: 2019-01-15

## 2019-02-12 ENCOUNTER — HOSPITAL ENCOUNTER (EMERGENCY)
Facility: HOSPITAL | Age: 59
Discharge: HOME | End: 2019-02-12
Attending: EMERGENCY MEDICINE | Admitting: EMERGENCY MEDICINE

## 2019-02-12 ENCOUNTER — APPOINTMENT (OUTPATIENT)
Dept: GENERAL RADIOLOGY | Facility: HOSPITAL | Age: 59
End: 2019-02-12

## 2019-02-12 VITALS
RESPIRATION RATE: 18 BRPM | DIASTOLIC BLOOD PRESSURE: 73 MMHG | HEIGHT: 68 IN | BODY MASS INDEX: 32.58 KG/M2 | SYSTOLIC BLOOD PRESSURE: 125 MMHG | HEART RATE: 85 BPM | OXYGEN SATURATION: 93 % | TEMPERATURE: 97.6 F | WEIGHT: 215 LBS

## 2019-02-12 DIAGNOSIS — S20.211A RIB CONTUSION, RIGHT, INITIAL ENCOUNTER: ICD-10-CM

## 2019-02-12 DIAGNOSIS — S20.219A CONTUSION OF CHEST WALL, UNSPECIFIED LATERALITY, INITIAL ENCOUNTER: Primary | ICD-10-CM

## 2019-02-12 PROCEDURE — 93010 ELECTROCARDIOGRAM REPORT: CPT | Performed by: INTERNAL MEDICINE

## 2019-02-12 PROCEDURE — 71046 X-RAY EXAM CHEST 2 VIEWS: CPT

## 2019-02-12 PROCEDURE — 25010000002 HYDROMORPHONE PER 4 MG: Performed by: EMERGENCY MEDICINE

## 2019-02-12 PROCEDURE — 93005 ELECTROCARDIOGRAM TRACING: CPT | Performed by: EMERGENCY MEDICINE

## 2019-02-12 PROCEDURE — 96372 THER/PROPH/DIAG INJ SC/IM: CPT

## 2019-02-12 PROCEDURE — 99284 EMERGENCY DEPT VISIT MOD MDM: CPT

## 2019-02-12 RX ORDER — HYDROMORPHONE HCL 110MG/55ML
1 PATIENT CONTROLLED ANALGESIA SYRINGE INTRAVENOUS
Status: DISCONTINUED | OUTPATIENT
Start: 2019-02-12 | End: 2019-02-12 | Stop reason: HOSPADM

## 2019-02-12 RX ORDER — OXYCODONE AND ACETAMINOPHEN 10; 325 MG/1; MG/1
1 TABLET ORAL EVERY 6 HOURS PRN
Qty: 15 TABLET | Refills: 0 | Status: SHIPPED | OUTPATIENT
Start: 2019-02-12 | End: 2021-04-12

## 2019-02-12 RX ORDER — NAPROXEN 500 MG/1
500 TABLET ORAL 2 TIMES DAILY PRN
Qty: 20 TABLET | Refills: 0 | Status: SHIPPED | OUTPATIENT
Start: 2019-02-12 | End: 2019-04-17 | Stop reason: ALTCHOICE

## 2019-02-12 RX ORDER — ONDANSETRON 4 MG/1
4 TABLET, ORALLY DISINTEGRATING ORAL ONCE
Status: COMPLETED | OUTPATIENT
Start: 2019-02-12 | End: 2019-02-12

## 2019-02-12 RX ORDER — OXYCODONE HYDROCHLORIDE AND ACETAMINOPHEN 5; 325 MG/1; MG/1
1 TABLET ORAL EVERY 6 HOURS PRN
Qty: 15 TABLET | Refills: 0 | Status: SHIPPED | OUTPATIENT
Start: 2019-02-12 | End: 2019-02-12

## 2019-02-12 RX ADMIN — HYDROMORPHONE HYDROCHLORIDE 1 MG: 2 INJECTION INTRAMUSCULAR; INTRAVENOUS; SUBCUTANEOUS at 16:02

## 2019-02-12 RX ADMIN — ONDANSETRON 4 MG: 4 TABLET, ORALLY DISINTEGRATING ORAL at 16:02

## 2019-02-12 NOTE — DISCHARGE INSTRUCTIONS
Up with the cardiothoracic service 4-6 weeks for repeat x-ray.  Sling the arm for comfort this week.  No lifting or strenuous activity.  Return with any new or worsening symptoms or any concerns.  Do not take Norco and Percocet at the same time.  Switch temporarily for more consistent pain relief.

## 2019-02-12 NOTE — ED NOTES
"Patient had a fall on Friday going into work. Patient went to the ED in [facility].  He had reconstruction to his sternum and was told by them from xray that it was \"busted\".  Patient was referred to family doctor.  Patient is not able to control his pain in chest from the fall and is not able to move his right arm without having pain.     Deepa Bower RN  02/12/19 [id number]    "

## 2019-02-12 NOTE — ED PROVIDER NOTES
Subjective   Patient presents emergency department complaint of a anterior chest pain after a fall on Friday.  This was approximately 5 days ago.  Patient states he slipped on some ice and fell forward.  Patient has had a prior sternal reconstruction with the cardiothoracic surgeon, Dr. Sutton.  Patient states that he was seen at [facility] [place].  The patient states that they noted there that his hardware had been broken and that he needed to follow-up.  Patient was unable to get a follow-up with cardiothoracic surgery so came to the ED secondary to the continued pain.  There is been no shortness of breath, though the patient has had pain with breathing.            Review of Systems   Constitutional: Negative.  Negative for appetite change, chills and fever.   HENT: Negative.  Negative for congestion.    Eyes: Negative.  Negative for photophobia and visual disturbance.   Respiratory: Negative.  Negative for cough, chest tightness and shortness of breath.    Cardiovascular: Positive for chest pain. Negative for palpitations.   Gastrointestinal: Negative.  Negative for abdominal pain, constipation, diarrhea, nausea and vomiting.   Endocrine: Negative.    Genitourinary: Negative.  Negative for decreased urine volume, dysuria, flank pain and hematuria.   Musculoskeletal: Negative.  Negative for arthralgias, back pain, myalgias, neck pain and neck stiffness.   Skin: Negative.  Negative for pallor.   Neurological: Negative.  Negative for dizziness, syncope, weakness, light-headedness, numbness and headaches.   Psychiatric/Behavioral: Negative.  Negative for confusion and suicidal ideas. The patient is not nervous/anxious.    All other systems reviewed and are negative.      Past Medical History:   Diagnosis Date   • Accident caused by hypodermic needle    • Guerra esophagus    • CAD (coronary artery disease)    • Contact with and (suspected) exposure to potentially hazardous body fluids    •  "Diabetes mellitus (CMS/[facility])    • GERD (gastroesophageal reflux disease)    • Kidney stones    • Meniere's disease    • MRSA infection     right ing hernia   • PAD (peripheral artery disease) (CMS/[facility])        Allergies   Allergen Reactions   • Amiodarone Anaphylaxis   • Contrast Dye Anaphylaxis   • Iodine Anaphylaxis   • Multivitamins Anaphylaxis     Oyster calcium   • Proton Pump Inhibitors Shortness Of Breath   • Shellfish-Derived Products Anaphylaxis   • Dexamethasone Other (See Comments)     hiccups   • Livalo [Pitavastatin] Swelling   • Plavix [Clopidogrel Bisulfate] Other (See Comments)     Excessive bleeding   • Statins Swelling   • Tetracyclines & Related Other (See Comments)     \"skin falls off\"   • Penicillins Rash       Past Surgical History:   Procedure Laterality Date   • APPENDECTOMY     • CARDIAC CATHETERIZATION     • CARDIAC SURGERY  2016    CABG   • CHEST TUBE INSERTION     • CORONARY ARTERY BYPASS GRAFT     • GALLBLADDER SURGERY     • INCISION AND DRAINAGE ABSCESS     • INGUINAL HERNIA REPAIR Bilateral    • LUMBAR LAMINECTOMY     • STERNAL REWIRING N/A 2017    Procedure: REPAIR STERNAL DEHISCENCE WITH STERNAL DEBRIDEMENT  Need plate and screws;  Surgeon: Homero Sutton MD;  Location: [facility];  Service:    • UMBILICAL HERNIA REPAIR         History reviewed. No pertinent family history.    Social History     Socioeconomic History   • Marital status: Single     Spouse name: Not on file   • Number of children: Not on file   • Years of education: Not on file   • Highest education level: Not on file   Tobacco Use   • Smoking status: Current Every Day Smoker     Packs/day: 0.50     Years: 40.00     Pack years: 20.00     Types: Cigarettes     Last attempt to quit: 1/3/2017     Years since quittin.1   • Smokeless tobacco: Never Used   Substance and Sexual Activity   • Alcohol use: No   • Drug use: No   • Sexual activity: Defer           Objective   Physical Exam   Constitutional: He " is oriented to person, place, and time. He appears well-developed and well-nourished. No distress.   HENT:   Head: Normocephalic and atraumatic.   Eyes: Conjunctivae and EOM are normal.   Neck: Normal range of motion. Neck supple. No JVD present.   Cardiovascular: Normal rate, regular rhythm, normal heart sounds and intact distal pulses. Exam reveals no gallop and no friction rub.   No murmur heard.  Patient with anterior chest wall pain to palpation in the lower sternal area.  Patient also with pain with range of motion of the right arm.    Pulmonary/Chest: Effort normal. No respiratory distress. He has no wheezes. He has no rales. He exhibits no tenderness.   Abdominal: Soft. Bowel sounds are normal. He exhibits no distension and no mass. There is no tenderness. There is no rebound and no guarding.   Musculoskeletal: Normal range of motion.   Neurological: He is alert and oriented to person, place, and time.   Skin: Skin is warm and dry. Capillary refill takes less than 2 seconds.   Psychiatric: He has a normal mood and affect. His behavior is normal. Judgment and thought content normal.   Nursing note and vitals reviewed.      Procedures           ED Course      Labs Reviewed - No data to display    XR Chest PA & Lateral   Final Result   CONCLUSION:          1. No evidence of an active cardiopulmonary process.                                                       Electronically signed by:  JAMES Tubbs MD  2/12/2019 4:50   PM CST Workstation: [id number]          Patient x-ray reviewed with Dr. Sutton from vascular surgery cardiothoracic surgery.  There is a broken plate in the inferior portion of the repair.  This appears stable.  No evidence of pneumothorax.  There is no evidence of displaced rib fracture at this time.  Patient's pain medications to be changed from Norco to Percocet to help alleviate his pain for the next 3 days.  Patient will then follow-up with his providers.  Patient will see the  cardiothoracic service in 4-6 weeks for re-x-ray.          [facility]      Final diagnoses:   Contusion of chest wall, unspecified laterality, initial encounter   Rib contusion, right, initial encounter            Skip Bojorquez MD  02/12/19 [id number]

## 2019-02-12 NOTE — ED NOTES
Pt states he has had sternal reconstructive surgery. Pt fell Friday and went to the doctor whom performed x-rays showing the hardware is broken. Pt comes in today for increased pain.      Radha Jones RN  02/12/19 [id number]

## 2019-03-05 DIAGNOSIS — M96.89 NONUNION OF STERNUM AFTER STERNOTOMY: Primary | ICD-10-CM

## 2019-03-07 ENCOUNTER — HOSPITAL ENCOUNTER (OUTPATIENT)
Dept: GENERAL RADIOLOGY | Facility: HOSPITAL | Age: 59
Discharge: HOME | End: 2019-03-07
Admitting: NURSE PRACTITIONER

## 2019-03-07 ENCOUNTER — OFFICE VISIT (OUTPATIENT)
Dept: CARDIAC SURGERY | Facility: CLINIC | Age: 59
End: 2019-03-07

## 2019-03-07 VITALS
SYSTOLIC BLOOD PRESSURE: 162 MMHG | DIASTOLIC BLOOD PRESSURE: 88 MMHG | TEMPERATURE: 98.5 F | WEIGHT: 224.5 LBS | HEIGHT: 68 IN | BODY MASS INDEX: 34.02 KG/M2 | HEART RATE: 74 BPM | OXYGEN SATURATION: 97 %

## 2019-03-07 DIAGNOSIS — M96.89 NONUNION OF STERNUM AFTER STERNOTOMY: Primary | ICD-10-CM

## 2019-03-07 DIAGNOSIS — M96.89 NONUNION OF STERNUM AFTER STERNOTOMY: ICD-10-CM

## 2019-03-07 PROCEDURE — 71046 X-RAY EXAM CHEST 2 VIEWS: CPT

## 2019-03-07 PROCEDURE — 99214 OFFICE O/P EST MOD 30 MIN: CPT | Performed by: NURSE PRACTITIONER

## 2019-03-07 NOTE — PATIENT INSTRUCTIONS
Rest and brace sternum.  Use heart hugger to brace sternum with activity.  Follow up with Dr. Sutton in 3 months.  Excuse from work and return to normal activities as tolerated.

## 2019-03-12 NOTE — PROGRESS NOTES
CVTS Office Progress Note     Subjective   Patient ID: Paddy Brantley is a 58 y.o. male is here today for follow-up malunion of sternum    Chief Complaint:    Chief Complaint   Patient presents with   • Coronary Artery Disease   • Chest Pain       PCP:  Nick Modi MD  Cardiology: Dr. Resendez     History of Present Illness  Karon is a pleasant 58-year-old male with a history of CAD, diabetes, hyperlipidemia, hypertension, smoking status.  Developed chest pain and elevated troponins in November 2016, underwent LHC demonstrating severe multivessel disease as a result underwent CABG x4 LIMA-LAD, SVG-OM, SVG-BX, SVG-PDA in late November 2016.  He had uneventful inpatient stay was discharged home on postop day 4.  He was subsequently readmitted with left hemothorax as a result of sensitivity to Plavix and was subsequently drained with chest tube placement approximately 4 L.  He developed sternal instability as an outpatient and underwent sternal revision with plating in December 2017, readmitted to the hospital in January 2018 with hematoma of chest wall which was nonsurgical.  He reports today after visiting the emergency department for fall in which his sternum made first contact with the ground followed by his head, chest x-ray revealed fracture of sternal plating at the base of the sternum, he has significant pain, reports inability to perform job duties effectively due to his pain.  He reports today in follow-up with cardiothoracic and vascular surgery to discuss plan of care regarding broken hardware and unstable sternum.    Carotid Studies  11/2016: Carotid duplex: BICA <50%     Interventions  11/22/16: CABG x4 LIMA-LAD, SVG-OM, SVG-DX, SVG-PDA, EVH  1/3/17: CXR: LARGE LEFT HEMOTHORAX, CT Guided thoracentesis 500cc  1/4/16: CT placed 2.5L sanginous fluid drained  12/19/17  Sternal revision and Repair with Sternal Plates.     Cardiology  November 2016 echocardiogram: EF 50-55%, mild MAC, trace MR,  diastolic dysfunction  2016 LHC: 70% LAD, 70% circumflex, 80% RCA, 70% diagonal    The following portions of the patient's history were reviewed and updated as appropriate: allergies, current medications, past family history, past medical history, past social history, past surgical history and problem list.  Recent images independently reviewed.  Available laboratory values reviewed.      Past Medical History:   Diagnosis Date   • Accident caused by hypodermic needle    • Guerra esophagus    • CAD (coronary artery disease)    • Contact with and (suspected) exposure to potentially hazardous body fluids    • Diabetes mellitus (CMS/HCC)    • GERD (gastroesophageal reflux disease)    • Kidney stones    • Meniere's disease    • MRSA infection     right ing hernia   • PAD (peripheral artery disease) (CMS/HCC)      Past Surgical History:   Procedure Laterality Date   • APPENDECTOMY     • CARDIAC CATHETERIZATION     • CARDIAC SURGERY  2016    CABG   • CHEST TUBE INSERTION     • CORONARY ARTERY BYPASS GRAFT     • GALLBLADDER SURGERY     • INCISION AND DRAINAGE ABSCESS     • INGUINAL HERNIA REPAIR Bilateral    • LUMBAR LAMINECTOMY     • STERNAL REWIRING N/A 2017    Procedure: REPAIR STERNAL DEHISCENCE WITH STERNAL DEBRIDEMENT  Need plate and screws;  Surgeon: Homero Sutton MD;  Location: [facility];  Service:    • UMBILICAL HERNIA REPAIR       History reviewed. No pertinent family history.  Social History     Tobacco Use   • Smoking status: Current Every Day Smoker     Packs/day: 0.50     Years: 40.00     Pack years: 20.00     Types: Cigarettes     Last attempt to quit: 1/3/2017     Years since quittin.1   • Smokeless tobacco: Never Used   Substance Use Topics   • Alcohol use: No   • Drug use: No       ALLERGIES:   Amiodarone; Contrast dye; Iodine; Multivitamins; Proton pump inhibitors; Shellfish-derived products; Dexamethasone; Livalo [pitavastatin]; Plavix [clopidogrel bisulfate]; Statins;  Tetracyclines & related; and Penicillins    MEDICATIONS:      Current Outpatient Medications:   •  aspirin 325 MG tablet, Take 325 mg by mouth Daily., Disp: , Rfl:   •  CARTIA  MG 24 hr capsule, TAKE 1 CAPSULE BY MOUTH EVERY NIGHT, Disp: 90 capsule, Rfl: 0  •  diphenhydrAMINE (BENADRYL) 25 mg capsule, Take 25 mg by mouth Every 6 (Six) Hours As Needed for Itching., Disp: , Rfl:   •  insulin NPH (HUMULIN N) 100 UNIT/ML injection, Inject 42 Units under the skin 2 (Two) Times a Day., Disp: , Rfl:   •  NOVOLIN R 100 UNIT/ML injection, Inject 22 Units as directed 2 (Two) Times a Day., Disp: , Rfl:   •  oxyCODONE-acetaminophen (PERCOCET)  MG per tablet, Take 1 tablet by mouth Every 6 (Six) Hours As Needed for Moderate Pain ., Disp: 15 tablet, Rfl: 0  •  raNITIdine (ZANTAC) 300 MG tablet, Take 300 mg by mouth 2 (Two) Times a Day., Disp: , Rfl:   •  ascorbic acid (VITAMIN C) 1000 MG tablet, Take 1,000 mg by mouth 2 (Two) Times a Day., Disp: , Rfl:   •  Biotin 5000 MCG tablet, Take 5,000 mcg by mouth Daily. 2 tablets, Disp: , Rfl:   •  Coenzyme Q10 (COQ10) 100 MG capsule, Take 1 tablet by mouth Daily., Disp: , Rfl:   •  naproxen (NAPROSYN) 500 MG tablet, Take 1 tablet by mouth 2 (Two) Times a Day As Needed for Moderate Pain ., Disp: 20 tablet, Rfl: 0    Review of Systems   Constitution: Negative for decreased appetite, weakness, malaise/fatigue, weight gain and weight loss.   HENT: Negative for congestion, hoarse voice and sore throat.    Eyes: Negative for blurred vision and visual disturbance.   Cardiovascular: Positive for chest pain. Negative for dyspnea on exertion and leg swelling.        Reports instability of sternum clicking in chest   Respiratory: Negative for cough, shortness of breath and sputum production.    Endocrine: Negative for polyuria.   Hematologic/Lymphatic: Negative for bleeding problem. Does not bruise/bleed easily.        Does not report S/S of adverse bleeding event including nose bleeds,  hematuria, melena, gingival bleeding, or hematemesis.   Skin: Negative for nail changes, poor wound healing and suspicious lesions.   Musculoskeletal: Positive for arthritis, back pain and joint pain.        Pain in sternum   Gastrointestinal: Negative for bloating, abdominal pain, hematemesis, hematochezia, jaundice, nausea and vomiting.   Genitourinary: Negative for dysuria and hematuria.   Neurological: Negative for brief paralysis, focal weakness, light-headedness, numbness and paresthesias.        No amaurosis fugax, TIA, or CVA.     Psychiatric/Behavioral: Negative for altered mental status, hallucinations, memory loss and suicidal ideas.   Allergic/Immunologic: Negative for hives and persistent infections.     Vitals:    03/07/19 1344   BP: 162/88   Pulse: 74   Temp: 98.5 °F (36.9 °C)   SpO2: 97%      Objective      Body mass index is 34.14 kg/m².  Physical Exam   Constitutional: He is oriented to person, place, and time. He appears well-developed and well-nourished.   HENT:   Head: Normocephalic and atraumatic.   Mouth/Throat: Oropharynx is clear and moist.   Eyes: Conjunctivae and EOM are normal. Pupils are equal, round, and reactive to light.   Neck: Normal range of motion. Neck supple. No JVD present.   Cardiovascular: Normal rate, regular rhythm, normal heart sounds and intact distal pulses.   Sternum unstable to palpation and cough, audible clicking present   Pulmonary/Chest: Effort normal and breath sounds normal. He has no wheezes.   Abdominal: Soft. Bowel sounds are normal. He exhibits no distension. There is no tenderness.   Musculoskeletal: He exhibits tenderness (sternum). He exhibits no edema.   ROM limited due to pain in sternum   Lymphadenopathy:     He has no cervical adenopathy.   Neurological: He is alert and oriented to person, place, and time. No cranial nerve deficit. Coordination normal.   Skin: Skin is warm and dry. Capillary refill takes less than 2 seconds.   Psychiatric: He has a  normal mood and affect. His behavior is normal. Judgment normal.   Nursing note and vitals reviewed.      03/07/2019 CXR      Assessment & Plan     Independent Review of Studies  CXR 3/7/2019     Unstable sternum status post sternal revision with Plating  Sternal instability on palpation with cough.  Chest x-ray demonstrates fracture hardware base of sternum at the level of the seventh rib.  Recommend weight lifting restrictions no greater than 10 pounds for 6 weeks, use heart hugger device for sternum stabilization with activity.  Have discussed case with Dr. Sutton. Reassess ability to return to work in 30 days.  Follow-up with Dr. Sutton in 3 months.  Plan for CT of the chest at some point with the possibility of surgical intervention after 6 months from trauma based on symptoms.  Pain management with percocet per PCP.      ASCVD  Medical management of coronary artery disease at this time includes ASA, CCB.  He is intolerant of statins and Plavix.  Currently does not report symptoms of angina or angina equivalents.  Patient is educated and understands the S/S of acute coronary syndrome and is aware to report to the nearest emergency department if these symptoms were to persist at rest.          Detailed discussion regarding risks, benefits, and treatment plan. Images independently reviewed. Patient understands, agrees, and wishes to proceed with plan.

## 2019-04-17 ENCOUNTER — OFFICE VISIT (OUTPATIENT)
Dept: CARDIAC SURGERY | Facility: CLINIC | Age: 59
End: 2019-04-17

## 2019-04-17 VITALS
BODY MASS INDEX: 34.34 KG/M2 | OXYGEN SATURATION: 98 % | DIASTOLIC BLOOD PRESSURE: 90 MMHG | HEIGHT: 68 IN | WEIGHT: 226.6 LBS | HEART RATE: 82 BPM | SYSTOLIC BLOOD PRESSURE: 162 MMHG | TEMPERATURE: 98.3 F

## 2019-04-17 DIAGNOSIS — M96.89 NONUNION OF STERNUM AFTER STERNOTOMY: ICD-10-CM

## 2019-04-17 DIAGNOSIS — Z72.0 TOBACCO ABUSE: ICD-10-CM

## 2019-04-17 DIAGNOSIS — S22.23XK CLOSED STERNAL MANUBRIAL DISSOCIATION FRACTURE WITH NONUNION, SUBSEQUENT ENCOUNTER: Primary | ICD-10-CM

## 2019-04-17 PROCEDURE — 99406 BEHAV CHNG SMOKING 3-10 MIN: CPT | Performed by: NURSE PRACTITIONER

## 2019-04-17 PROCEDURE — 99214 OFFICE O/P EST MOD 30 MIN: CPT | Performed by: NURSE PRACTITIONER

## 2019-04-17 RX ORDER — IBUPROFEN 200 MG
200 TABLET ORAL EVERY 6 HOURS PRN
COMMUNITY
End: 2019-06-08 | Stop reason: HOSPADM

## 2019-04-17 NOTE — PATIENT INSTRUCTIONS
CT chest scheduled for May, follow up with Dr. Sutton, return to work in 4 weeks.  Use heart hugger, stabilization, and lifting restrictions limited to less than 10 lbs.

## 2019-04-22 NOTE — PROGRESS NOTES
CVTS Office Progress Note     Subjective   Patient ID: Paddy Brantley is a 58 y.o. male is here today for follow-up malunion of sternum    Chief Complaint:    Chief Complaint   Patient presents with   • nonunion of sternum       PCP:  Nick Modi MD  Cardiology: Dr. Resendez     History of Present Illness  Karon is a pleasant 58-year-old male with a history of CAD, diabetes, hyperlipidemia, hypertension, smoking status.  Developed chest pain and elevated troponins in November 2016, underwent LHC demonstrating severe multivessel disease as a result underwent CABG x4 LIMA-LAD, SVG-OM, SVG-BX, SVG-PDA in late November 2016.  He had uneventful inpatient stay was discharged home on postop day 4.  He was subsequently readmitted with left hemothorax as a result of sensitivity to Plavix and was subsequently drained with chest tube placement approximately 4 L.  He developed sternal instability as an outpatient and underwent sternal revision with plating in December 2017, readmitted to the hospital in January 2018 with hematoma of chest wall which was nonsurgical.  He reports today after visiting the emergency department for fall in which his sternum made first contact with the ground followed by his head, chest x-ray revealed fracture of sternal plating at the base of the sternum, he has significant pain, reports inability to perform job duties effectively due to his pain.  Off 6 weeks from work, feels unable to return due to pain intolerance.  He reports today in follow-up with cardiothoracic and vascular surgery to discuss plan of care regarding broken hardware and unstable sternum.    11/2016: Carotid duplex: BICA <50%   11/22/16: CABG x4 LIMA-LAD, SVG-OM, SVG-DX, SVG-PDA, EVH  1/3/17: CXR: LARGE LEFT HEMOTHORAX, CT Guided thoracentesis 500cc  1/4/16: CT placed 2.5L sanginous fluid drained  12/19/17  Sternal revision and Repair with Sternal Plates.     November 2016 echocardiogram: EF 50-55%, mild MAC,  trace MR, diastolic dysfunction  2016 LHC: 70% LAD, 70% circumflex, 80% RCA, 70% diagonal    The following portions of the patient's history were reviewed and updated as appropriate: allergies, current medications, past family history, past medical history, past social history, past surgical history and problem list.  Recent images independently reviewed.  Available laboratory values reviewed.      Past Medical History:   Diagnosis Date   • Accident caused by hypodermic needle    • Guerra esophagus    • CAD (coronary artery disease)    • Contact with and (suspected) exposure to potentially hazardous body fluids    • Diabetes mellitus (CMS/HCC)    • GERD (gastroesophageal reflux disease)    • Kidney stones    • Meniere's disease    • MRSA infection     right ing hernia   • PAD (peripheral artery disease) (CMS/HCC)      Past Surgical History:   Procedure Laterality Date   • APPENDECTOMY     • CARDIAC CATHETERIZATION     • CARDIAC SURGERY  2016    CABG   • CHEST TUBE INSERTION     • CORONARY ARTERY BYPASS GRAFT     • GALLBLADDER SURGERY     • INCISION AND DRAINAGE ABSCESS     • INGUINAL HERNIA REPAIR Bilateral    • LUMBAR LAMINECTOMY     • STERNAL REWIRING N/A 2017    Procedure: REPAIR STERNAL DEHISCENCE WITH STERNAL DEBRIDEMENT  Need plate and screws;  Surgeon: Homero Sutton MD;  Location: [facility];  Service:    • UMBILICAL HERNIA REPAIR       History reviewed. No pertinent family history.  Social History     Tobacco Use   • Smoking status: Current Every Day Smoker     Packs/day: 0.50     Years: 40.00     Pack years: 20.00     Types: Cigarettes     Last attempt to quit: 1/3/2017     Years since quittin.2   • Smokeless tobacco: Never Used   Substance Use Topics   • Alcohol use: No   • Drug use: No       ALLERGIES:   Amiodarone; Contrast dye; Iodine; Multivitamins; Proton pump inhibitors; Shellfish-derived products; Dexamethasone; Livalo [pitavastatin]; Plavix [clopidogrel bisulfate];  Statins; Tetracyclines & related; and Penicillins    MEDICATIONS:      Current Outpatient Medications:   •  ascorbic acid (VITAMIN C) 1000 MG tablet, Take 1,000 mg by mouth 2 (Two) Times a Day., Disp: , Rfl:   •  aspirin 325 MG tablet, Take 325 mg by mouth Daily., Disp: , Rfl:   •  CARTIA  MG 24 hr capsule, TAKE 1 CAPSULE BY MOUTH EVERY NIGHT, Disp: 90 capsule, Rfl: 0  •  Coenzyme Q10 (COQ10) 100 MG capsule, Take 1 tablet by mouth Daily., Disp: , Rfl:   •  diphenhydrAMINE (BENADRYL) 25 mg capsule, Take 25 mg by mouth Every 6 (Six) Hours As Needed for Itching., Disp: , Rfl:   •  ibuprofen (ADVIL,MOTRIN) 200 MG tablet, Take 200 mg by mouth Every 6 (Six) Hours As Needed for Mild Pain ., Disp: , Rfl:   •  insulin NPH (HUMULIN N) 100 UNIT/ML injection, Inject 42 Units under the skin 2 (Two) Times a Day., Disp: , Rfl:   •  NOVOLIN R 100 UNIT/ML injection, Inject 22 Units as directed 2 (Two) Times a Day., Disp: , Rfl:   •  oxyCODONE-acetaminophen (PERCOCET)  MG per tablet, Take 1 tablet by mouth Every 6 (Six) Hours As Needed for Moderate Pain ., Disp: 15 tablet, Rfl: 0  •  raNITIdine (ZANTAC) 300 MG tablet, Take 300 mg by mouth 2 (Two) Times a Day., Disp: , Rfl:   •  Biotin 5000 MCG tablet, Take 5,000 mcg by mouth Daily. 2 tablets, Disp: , Rfl:     Review of Systems   Constitution: Negative for decreased appetite, weakness, malaise/fatigue, weight gain and weight loss.   HENT: Negative for congestion, hoarse voice and sore throat.    Eyes: Negative for blurred vision and visual disturbance.   Cardiovascular: Positive for chest pain. Negative for dyspnea on exertion and leg swelling.        Reports instability of sternum clicking in chest   Respiratory: Negative for cough, shortness of breath and sputum production.    Endocrine: Negative for polyuria.   Hematologic/Lymphatic: Negative for bleeding problem. Does not bruise/bleed easily.        Does not report S/S of adverse bleeding event including nose bleeds,  hematuria, melena, gingival bleeding, or hematemesis.   Skin: Negative for nail changes, poor wound healing and suspicious lesions.   Musculoskeletal: Positive for arthritis, back pain and joint pain.        Pain in sternum   Gastrointestinal: Negative for bloating, abdominal pain, hematemesis, hematochezia, jaundice, nausea and vomiting.   Genitourinary: Negative for dysuria and hematuria.   Neurological: Negative for brief paralysis, focal weakness, light-headedness, numbness and paresthesias.        No amaurosis fugax, TIA, or CVA.     Psychiatric/Behavioral: Negative for altered mental status, hallucinations, memory loss and suicidal ideas.   Allergic/Immunologic: Negative for hives and persistent infections.     Vitals:    04/17/19 1105   BP: 162/90   Pulse: 82   Temp: 98.3 °F (36.8 °C)   SpO2: 98%      Objective      Body mass index is 34.45 kg/m².  Physical Exam   Constitutional: He is oriented to person, place, and time. He appears well-developed and well-nourished.   HENT:   Head: Normocephalic and atraumatic.   Mouth/Throat: Oropharynx is clear and moist.   Eyes: Conjunctivae and EOM are normal. Pupils are equal, round, and reactive to light.   Neck: Normal range of motion. Neck supple. No JVD present.   Cardiovascular: Normal rate, regular rhythm, normal heart sounds and intact distal pulses.   Sternum unstable to palpation and cough, audible clicking present   Pulmonary/Chest: Effort normal and breath sounds normal. He has no wheezes.   Abdominal: Soft. Bowel sounds are normal. He exhibits no distension. There is no tenderness.   Musculoskeletal: He exhibits tenderness (sternum). He exhibits no edema.   ROM limited due to pain in sternum   Lymphadenopathy:     He has no cervical adenopathy.   Neurological: He is alert and oriented to person, place, and time. No cranial nerve deficit. Coordination normal.   Skin: Skin is warm and dry. Capillary refill takes less than 2 seconds.   Psychiatric: He has a  normal mood and affect. His behavior is normal. Judgment normal.   Nursing note and vitals reviewed.      03/07/2019 CXR          Assessment & Plan       Unstable sternum status post sternal revision with Plating  Sternal instability on palpation with cough.  Chest x-ray demonstrates fracture hardware base of sternum at the level of the seventh rib.  Recommend weight lifting restrictions no greater than 10 pounds for 6 weeks, use heart hugger device for sternum stabilization with activity.  Additional 4 weeks off work then return to light duty.  Plan for CT of chest no contrast in May, follow-up with Dr. Sutton in June.  Pain management with percocet per PCP.      -CT of chest without contrast    ASCVD  Medical management of coronary artery disease at this time includes ASA, CCB.  He is intolerant of statins and Plavix, full dose ASA regimen.  Currently does not report symptoms of angina or angina equivalents.  Patient is educated and understands the S/S of acute coronary syndrome and is aware to report to the nearest emergency department if these symptoms were to persist at rest.      Tobacco Dependence Syndrome  Smoking cessation assistance options offered including behavioral counseling (Smoking Cessation Classes), Nicotine replacement therapy (patches or gum), pharmacologic therapy (Chantix, Wellbutrin). Understands tobacco increases risk of expanding AAA, MI, CVA, PAD, carcinoma. Discussion and question answer period 5-7 minutes.    Lifestyle Modifications  Recommended lifestyle modifications to reduce the progression of vascular disease include increasing moderate activity to 150 minutes per week, avoidance of smoking, optimizing blood pressure control, strict control of diabetes, and management of hyperlipidemia.  Choose a diet that emphasizes intake of vegetables, fruits, and whole grains; includes low-fat dairy products, poultry, fish, legumes, nontropical vegetable oils, and nuts; and limits intake of  sweets, sugar-sweetened beverages, and red meats.  Limit alcohol intake to one drink per day in females and 2 drinks per day in men.          Detailed discussion regarding risks, benefits, and treatment plan. Images independently reviewed. Patient understands, agrees, and wishes to proceed with plan.

## 2019-05-09 ENCOUNTER — APPOINTMENT (OUTPATIENT)
Dept: CT IMAGING | Facility: HOSPITAL | Age: 59
End: 2019-05-09

## 2019-06-07 ENCOUNTER — HOSPITAL ENCOUNTER (OUTPATIENT)
Facility: HOSPITAL | Age: 59
Setting detail: OBSERVATION
Discharge: HOME | End: 2019-06-08
Attending: INTERNAL MEDICINE | Admitting: FAMILY MEDICINE

## 2019-06-07 ENCOUNTER — APPOINTMENT (OUTPATIENT)
Dept: CARDIOLOGY | Facility: HOSPITAL | Age: 59
End: 2019-06-07

## 2019-06-07 ENCOUNTER — APPOINTMENT (OUTPATIENT)
Dept: GENERAL RADIOLOGY | Facility: HOSPITAL | Age: 59
End: 2019-06-07

## 2019-06-07 DIAGNOSIS — I25.110 CORONARY ARTERY DISEASE INVOLVING NATIVE CORONARY ARTERY OF NATIVE HEART WITH UNSTABLE ANGINA PECTORIS (HCC): ICD-10-CM

## 2019-06-07 DIAGNOSIS — R07.2 PRECORDIAL PAIN: Primary | ICD-10-CM

## 2019-06-07 PROBLEM — R07.9 CHEST PAIN: Status: ACTIVE | Noted: 2019-06-07

## 2019-06-07 LAB
ALBUMIN SERPL-MCNC: 4.2 G/DL (ref 3.5–5.2)
ALBUMIN/GLOB SERPL: 1.6 G/DL
ALP SERPL-CCNC: 58 U/L (ref 39–117)
ALT SERPL W P-5'-P-CCNC: 14 U/L (ref 1–41)
ANION GAP SERPL CALCULATED.3IONS-SCNC: 11 MMOL/L
AST SERPL-CCNC: 14 U/L (ref 1–40)
BH CV ECHO MEAS - ACS: 2 CM
BH CV ECHO MEAS - AO MAX PG (FULL): 2.4 MMHG
BH CV ECHO MEAS - AO MAX PG: 7.1 MMHG
BH CV ECHO MEAS - AO MEAN PG (FULL): 2 MMHG
BH CV ECHO MEAS - AO MEAN PG: 4 MMHG
BH CV ECHO MEAS - AO ROOT AREA (BSA CORRECTED): 1.7
BH CV ECHO MEAS - AO ROOT AREA: 10.2 CM^2
BH CV ECHO MEAS - AO ROOT DIAM: 3.6 CM
BH CV ECHO MEAS - AO V2 MAX: 133 CM/SEC
BH CV ECHO MEAS - AO V2 MEAN: 91.1 CM/SEC
BH CV ECHO MEAS - AO V2 VTI: 25.6 CM
BH CV ECHO MEAS - AVA(I,A): 2.9 CM^2
BH CV ECHO MEAS - AVA(I,D): 2.9 CM^2
BH CV ECHO MEAS - AVA(V,A): 2.8 CM^2
BH CV ECHO MEAS - AVA(V,D): 2.8 CM^2
BH CV ECHO MEAS - BSA(HAYCOCK): 2.2 M^2
BH CV ECHO MEAS - BSA: 2.1 M^2
BH CV ECHO MEAS - BZI_BMI: 33.8 KILOGRAMS/M^2
BH CV ECHO MEAS - BZI_METRIC_HEIGHT: 172.7 CM
BH CV ECHO MEAS - BZI_METRIC_WEIGHT: 100.7 KG
BH CV ECHO MEAS - EDV(CUBED): 73.6 ML
BH CV ECHO MEAS - EDV(TEICH): 78.1 ML
BH CV ECHO MEAS - EF(CUBED): 59.1 %
BH CV ECHO MEAS - EF(TEICH): 51.1 %
BH CV ECHO MEAS - ESV(CUBED): 30.1 ML
BH CV ECHO MEAS - ESV(TEICH): 38.2 ML
BH CV ECHO MEAS - FS: 25.8 %
BH CV ECHO MEAS - IVS/LVPW: 0.91
BH CV ECHO MEAS - IVSD: 0.92 CM
BH CV ECHO MEAS - LA DIMENSION: 3.6 CM
BH CV ECHO MEAS - LA/AO: 1
BH CV ECHO MEAS - LV MASS(C)D: 130.3 GRAMS
BH CV ECHO MEAS - LV MASS(C)DI: 61 GRAMS/M^2
BH CV ECHO MEAS - LV MAX PG: 4.7 MMHG
BH CV ECHO MEAS - LV MEAN PG: 2 MMHG
BH CV ECHO MEAS - LV V1 MAX: 108 CM/SEC
BH CV ECHO MEAS - LV V1 MEAN: 64.7 CM/SEC
BH CV ECHO MEAS - LV V1 VTI: 21.3 CM
BH CV ECHO MEAS - LVIDD: 4.2 CM
BH CV ECHO MEAS - LVIDS: 3.1 CM
BH CV ECHO MEAS - LVOT AREA (M): 3.5 CM^2
BH CV ECHO MEAS - LVOT AREA: 3.5 CM^2
BH CV ECHO MEAS - LVOT DIAM: 2.1 CM
BH CV ECHO MEAS - LVPWD: 1 CM
BH CV ECHO MEAS - MV A MAX VEL: 78.6 CM/SEC
BH CV ECHO MEAS - MV DEC SLOPE: 508 CM/SEC^2
BH CV ECHO MEAS - MV E MAX VEL: 102 CM/SEC
BH CV ECHO MEAS - MV E/A: 1.3
BH CV ECHO MEAS - MV MAX PG: 4.8 MMHG
BH CV ECHO MEAS - MV MEAN PG: 1 MMHG
BH CV ECHO MEAS - MV P1/2T MAX VEL: 107 CM/SEC
BH CV ECHO MEAS - MV P1/2T: 61.7 MSEC
BH CV ECHO MEAS - MV V2 MAX: 109 CM/SEC
BH CV ECHO MEAS - MV V2 MEAN: 52.8 CM/SEC
BH CV ECHO MEAS - MV V2 VTI: 24.7 CM
BH CV ECHO MEAS - MVA P1/2T LCG: 2.1 CM^2
BH CV ECHO MEAS - MVA(P1/2T): 3.6 CM^2
BH CV ECHO MEAS - MVA(VTI): 3 CM^2
BH CV ECHO MEAS - PA MAX PG: 4.9 MMHG
BH CV ECHO MEAS - PA V2 MAX: 111 CM/SEC
BH CV ECHO MEAS - RAP SYSTOLE: 10 MMHG
BH CV ECHO MEAS - RVDD: 2.4 CM
BH CV ECHO MEAS - RVSP: 33.4 MMHG
BH CV ECHO MEAS - SI(AO): 122 ML/M^2
BH CV ECHO MEAS - SI(CUBED): 20.3 ML/M^2
BH CV ECHO MEAS - SI(LVOT): 34.5 ML/M^2
BH CV ECHO MEAS - SI(TEICH): 18.7 ML/M^2
BH CV ECHO MEAS - SV(AO): 260.6 ML
BH CV ECHO MEAS - SV(CUBED): 43.5 ML
BH CV ECHO MEAS - SV(LVOT): 73.8 ML
BH CV ECHO MEAS - SV(TEICH): 39.9 ML
BH CV ECHO MEAS - TR MAX VEL: 242 CM/SEC
BILIRUB SERPL-MCNC: 0.3 MG/DL (ref 0.2–1.2)
BUN BLD-MCNC: 16 MG/DL (ref 8–23)
BUN/CREAT SERPL: 17 (ref 7–25)
CALCIUM SPEC-SCNC: 9.7 MG/DL (ref 8.6–10.5)
CHLORIDE SERPL-SCNC: 105 MMOL/L (ref 98–107)
CHOLEST SERPL-MCNC: 224 MG/DL (ref 0–200)
CO2 SERPL-SCNC: 29 MMOL/L (ref 22–29)
CREAT BLD-MCNC: 0.94 MG/DL (ref 0.76–1.27)
GFR SERPL CREATININE-BSD FRML MDRD: 82 ML/MIN/1.73
GLOBULIN UR ELPH-MCNC: 2.7 GM/DL
GLUCOSE BLD-MCNC: 71 MG/DL (ref 65–99)
GLUCOSE BLDC GLUCOMTR-MCNC: 102 MG/DL (ref 70–130)
GLUCOSE BLDC GLUCOMTR-MCNC: 154 MG/DL (ref 70–130)
HBA1C MFR BLD: 6 % (ref 4.8–5.6)
HDLC SERPL-MCNC: 48 MG/DL (ref 40–60)
INR PPP: 0.9 (ref 0.8–1.2)
LDLC SERPL CALC-MCNC: 152 MG/DL (ref 0–100)
LDLC/HDLC SERPL: 3.17 {RATIO}
LV EF 2D ECHO EST: 53 %
MAXIMAL PREDICTED HEART RATE: 160 BPM
POTASSIUM BLD-SCNC: 4.4 MMOL/L (ref 3.5–5.2)
PROT SERPL-MCNC: 6.9 G/DL (ref 6–8.5)
PROTHROMBIN TIME: 12 SECONDS (ref 11.1–15.3)
SODIUM BLD-SCNC: 145 MMOL/L (ref 136–145)
STRESS TARGET HR: 136 BPM
TRIGL SERPL-MCNC: 120 MG/DL (ref 0–150)
TROPONIN T SERPL-MCNC: <0.01 NG/ML (ref 0–0.03)
TROPONIN T SERPL-MCNC: <0.01 NG/ML (ref 0–0.03)
VLDLC SERPL-MCNC: 24 MG/DL

## 2019-06-07 PROCEDURE — 93306 TTE W/DOPPLER COMPLETE: CPT

## 2019-06-07 PROCEDURE — 83036 HEMOGLOBIN GLYCOSYLATED A1C: CPT | Performed by: INTERNAL MEDICINE

## 2019-06-07 PROCEDURE — G0378 HOSPITAL OBSERVATION PER HR: HCPCS

## 2019-06-07 PROCEDURE — 93005 ELECTROCARDIOGRAM TRACING: CPT | Performed by: INTERNAL MEDICINE

## 2019-06-07 PROCEDURE — 99213 OFFICE O/P EST LOW 20 MIN: CPT | Performed by: INTERNAL MEDICINE

## 2019-06-07 PROCEDURE — 82962 GLUCOSE BLOOD TEST: CPT

## 2019-06-07 PROCEDURE — 96361 HYDRATE IV INFUSION ADD-ON: CPT

## 2019-06-07 PROCEDURE — 71046 X-RAY EXAM CHEST 2 VIEWS: CPT

## 2019-06-07 PROCEDURE — 85610 PROTHROMBIN TIME: CPT | Performed by: INTERNAL MEDICINE

## 2019-06-07 PROCEDURE — 63710000001 INSULIN ISOPHANE HUMAN PER 5 UNITS: Performed by: INTERNAL MEDICINE

## 2019-06-07 PROCEDURE — 84484 ASSAY OF TROPONIN QUANT: CPT | Performed by: INTERNAL MEDICINE

## 2019-06-07 PROCEDURE — 80053 COMPREHEN METABOLIC PANEL: CPT | Performed by: INTERNAL MEDICINE

## 2019-06-07 PROCEDURE — 63710000001 INSULIN ASPART PER 5 UNITS: Performed by: INTERNAL MEDICINE

## 2019-06-07 PROCEDURE — 93306 TTE W/DOPPLER COMPLETE: CPT | Performed by: INTERNAL MEDICINE

## 2019-06-07 PROCEDURE — 96360 HYDRATION IV INFUSION INIT: CPT

## 2019-06-07 PROCEDURE — 94760 N-INVAS EAR/PLS OXIMETRY 1: CPT

## 2019-06-07 PROCEDURE — 80061 LIPID PANEL: CPT | Performed by: INTERNAL MEDICINE

## 2019-06-07 PROCEDURE — 93010 ELECTROCARDIOGRAM REPORT: CPT | Performed by: INTERNAL MEDICINE

## 2019-06-07 PROCEDURE — 94799 UNLISTED PULMONARY SVC/PX: CPT

## 2019-06-07 RX ORDER — CARVEDILOL 3.12 MG/1
3.12 TABLET ORAL 2 TIMES DAILY WITH MEALS
Status: DISCONTINUED | OUTPATIENT
Start: 2019-06-07 | End: 2019-06-07

## 2019-06-07 RX ORDER — DEXTROSE MONOHYDRATE 25 G/50ML
25 INJECTION, SOLUTION INTRAVENOUS
Status: DISCONTINUED | OUTPATIENT
Start: 2019-06-07 | End: 2019-06-08 | Stop reason: HOSPADM

## 2019-06-07 RX ORDER — LOSARTAN POTASSIUM 50 MG/1
50 TABLET ORAL
Status: DISCONTINUED | OUTPATIENT
Start: 2019-06-07 | End: 2019-06-08 | Stop reason: HOSPADM

## 2019-06-07 RX ORDER — FAMOTIDINE 40 MG/1
40 TABLET, FILM COATED ORAL DAILY
Status: DISCONTINUED | OUTPATIENT
Start: 2019-06-08 | End: 2019-06-08 | Stop reason: HOSPADM

## 2019-06-07 RX ORDER — ACETAMINOPHEN 325 MG/1
650 TABLET ORAL EVERY 6 HOURS PRN
Status: DISCONTINUED | OUTPATIENT
Start: 2019-06-07 | End: 2019-06-08 | Stop reason: HOSPADM

## 2019-06-07 RX ORDER — BISACODYL 5 MG/1
5 TABLET, DELAYED RELEASE ORAL DAILY PRN
Status: DISCONTINUED | OUTPATIENT
Start: 2019-06-07 | End: 2019-06-08 | Stop reason: HOSPADM

## 2019-06-07 RX ORDER — NICOTINE POLACRILEX 4 MG
15 LOZENGE BUCCAL
Status: DISCONTINUED | OUTPATIENT
Start: 2019-06-07 | End: 2019-06-08 | Stop reason: HOSPADM

## 2019-06-07 RX ORDER — ONDANSETRON 2 MG/ML
4 INJECTION INTRAMUSCULAR; INTRAVENOUS EVERY 6 HOURS PRN
Status: DISCONTINUED | OUTPATIENT
Start: 2019-06-07 | End: 2019-06-08 | Stop reason: HOSPADM

## 2019-06-07 RX ORDER — SODIUM CHLORIDE 0.9 % (FLUSH) 0.9 %
3 SYRINGE (ML) INJECTION EVERY 12 HOURS SCHEDULED
Status: DISCONTINUED | OUTPATIENT
Start: 2019-06-07 | End: 2019-06-08 | Stop reason: HOSPADM

## 2019-06-07 RX ORDER — NITROGLYCERIN 0.4 MG/1
0.4 TABLET SUBLINGUAL
Status: DISCONTINUED | OUTPATIENT
Start: 2019-06-07 | End: 2019-06-08 | Stop reason: HOSPADM

## 2019-06-07 RX ORDER — ASPIRIN 81 MG/1
81 TABLET ORAL DAILY
Status: DISCONTINUED | OUTPATIENT
Start: 2019-06-07 | End: 2019-06-08 | Stop reason: HOSPADM

## 2019-06-07 RX ORDER — SODIUM CHLORIDE 9 MG/ML
100 INJECTION, SOLUTION INTRAVENOUS CONTINUOUS
Status: DISCONTINUED | OUTPATIENT
Start: 2019-06-07 | End: 2019-06-08 | Stop reason: HOSPADM

## 2019-06-07 RX ORDER — DILTIAZEM HYDROCHLORIDE 180 MG/1
180 CAPSULE, COATED, EXTENDED RELEASE ORAL
Status: DISCONTINUED | OUTPATIENT
Start: 2019-06-07 | End: 2019-06-08 | Stop reason: HOSPADM

## 2019-06-07 RX ORDER — SODIUM CHLORIDE 0.9 % (FLUSH) 0.9 %
3-10 SYRINGE (ML) INJECTION AS NEEDED
Status: DISCONTINUED | OUTPATIENT
Start: 2019-06-07 | End: 2019-06-08 | Stop reason: HOSPADM

## 2019-06-07 RX ORDER — OXYCODONE AND ACETAMINOPHEN 10; 325 MG/1; MG/1
1 TABLET ORAL EVERY 6 HOURS PRN
Status: DISCONTINUED | OUTPATIENT
Start: 2019-06-07 | End: 2019-06-08 | Stop reason: HOSPADM

## 2019-06-07 RX ORDER — HYDRALAZINE HYDROCHLORIDE 20 MG/ML
10 INJECTION INTRAMUSCULAR; INTRAVENOUS EVERY 6 HOURS PRN
Status: DISCONTINUED | OUTPATIENT
Start: 2019-06-07 | End: 2019-06-08 | Stop reason: HOSPADM

## 2019-06-07 RX ADMIN — LOSARTAN POTASSIUM 50 MG: 50 TABLET, FILM COATED ORAL at 17:29

## 2019-06-07 RX ADMIN — INSULIN ASPART 22 UNITS: 100 INJECTION, SOLUTION INTRAVENOUS; SUBCUTANEOUS at 20:46

## 2019-06-07 RX ADMIN — SODIUM CHLORIDE 100 ML/HR: 9 INJECTION, SOLUTION INTRAVENOUS at 15:12

## 2019-06-07 RX ADMIN — HUMAN INSULIN 42 UNITS: 100 INJECTION, SUSPENSION SUBCUTANEOUS at 20:43

## 2019-06-07 RX ADMIN — SODIUM CHLORIDE, PRESERVATIVE FREE 3 ML: 5 INJECTION INTRAVENOUS at 15:11

## 2019-06-07 RX ADMIN — DILTIAZEM HYDROCHLORIDE 180 MG: 180 CAPSULE, EXTENDED RELEASE ORAL at 20:42

## 2019-06-07 NOTE — NURSING NOTE
Patient is refusing lab work at this time. Clinical leader CONNIE Holland in room to discuss importance of lab work. Patient is still refusing labs.

## 2019-06-07 NOTE — PLAN OF CARE
Problem: Patient Care Overview  Goal: Individualization and Mutuality  Outcome: Ongoing (interventions implemented as appropriate)   06/07/19 0152   Individualization   Patient Specific Preferences patient doesnt like diet sodas   Patient Specific Goals (Include Timeframe) CP free   Patient Specific Interventions monitor pain     Goal: Discharge Needs Assessment  Outcome: Ongoing (interventions implemented as appropriate)      Problem: Cardiac: ACS (Acute Coronary Syndrome) (Adult)  Goal: Signs and Symptoms of Listed Potential Problems Will be Absent, Minimized or Managed (Cardiac: ACS)  Outcome: Ongoing (interventions implemented as appropriate)

## 2019-06-07 NOTE — CONSULTS
CARDIOLOGY CONSULTATION NOTE    Referring Provider: Ryan Sullivan MD    Reason for Consultation: Evaluation of chest pain    Paddy Brantley  1960  58 y.o. male      HPI  Mr. Eller is a 58-year-old male with multiple medical issues including coronary artery disease status post CABG, multiple PCI prior to CABG, GERD, diabetes mellitus, chronic pain syndrome, nicotine dependence, BPH, multiple drug allergies.  I am seeing him after a long interval. I had seen him in my office back in August 2017.  The patient apparently went to work at [email or web address] earlier today and developed retrosternal chest pain which spread across the front of the chest associated with some degree of shortness of breath and diaphoresis.  He was there was no nausea, vomiting, melena, hematemesis.  He was evaluated at the hospital ER and subsequently admitted to [facility] for further management.  His initial set of cardiac enzymes were negative for myocardial injury and EKG showed no acute ST-T wave changes.  He has an extensive cardiac history and underwent PCI to left anterior descending coronary artery, diagonal coronary artery in 2005, left circumflex coronary artery in 2007.  Cardiac catheterization in 11/2016 showed significant progression of CAD with multivessel disease and he underwent CABG by Dr. Sutton.  LIMA to LAD, SVG to OM, SVG to diagonal, SVG to PDA.    Post operative course was complex and as described below:     11/22/16: CABG x4 LIMA-LAD, SVG-OM, SVG-DX, SVG-PDA, EVH  1/3/17: CXR: LARGE LEFT HEMOTHORAX, CT Guided thoracentesis 500cc  1/4/16: CT placed 2.5L sanginous fluid drained   2/28/17: CXR: Improved Small Left effusion  10/12/17 CXR:  Atelectasis  Sternal wire fracture   10/18/17  CT Chest:  Fracture nonunion midsternal body at 4th sternal wires.    12/19/17  Sternal revision and Repair with Sternal Plates.   01/02/18  Chest xray:  Appliance intact  Left atelectasis   1/5/2018 CT Chest:  Mild to  moderate fluid collection anterior sternum.  3/4 plates intact.  Inferior plates appears to have pulled thru RIGHT hemisternum.    Apparently had a fall earlier this year in February has had sternal chest pain periodically since then.  I understand that he has been followed by CT surgery.  Pain earlier today however he indicates was different.  The patient has multiple drug allergies and has been unable to take multiple medications including statins, Plavix, beta-blockers to name a few.    He unfortunately continues to smoke and denied any alcohol or drug use.  He was pain-free at the time of my examination    SUBJECTIVE    Past Medical History:   Diagnosis Date   • Accident caused by hypodermic needle    • Guerra esophagus    • CAD (coronary artery disease)    • Contact with and (suspected) exposure to potentially hazardous body fluids    • Diabetes mellitus (CMS/HCC)    • GERD (gastroesophageal reflux disease)    • Kidney stones    • Meniere's disease    • MRSA infection     right ing hernia   • PAD (peripheral artery disease) (CMS/HCC)          Past Surgical History:   Procedure Laterality Date   • APPENDECTOMY     • CARDIAC CATHETERIZATION     • CARDIAC SURGERY  11/22/2016    CABG   • CHEST TUBE INSERTION     • CORONARY ARTERY BYPASS GRAFT     • GALLBLADDER SURGERY     • INCISION AND DRAINAGE ABSCESS     • INGUINAL HERNIA REPAIR Bilateral    • LUMBAR LAMINECTOMY     • STERNAL REWIRING N/A 12/19/2017    Procedure: REPAIR STERNAL DEHISCENCE WITH STERNAL DEBRIDEMENT  Need plate and screws;  Surgeon: Homero Sutton MD;  Location: [facility];  Service:    • UMBILICAL HERNIA REPAIR           No family history on file.      Social History     Socioeconomic History   • Marital status: Single     Spouse name: Not on file   • Number of children: Not on file   • Years of education: Not on file   • Highest education level: Not on file   Tobacco Use   • Smoking status: Current Every Day Smoker     Packs/day: 0.50      "Years: 40.00     Pack years: 20.00     Types: Cigarettes     Last attempt to quit: 1/3/2017     Years since quittin.4   • Smokeless tobacco: Never Used   Substance and Sexual Activity   • Alcohol use: No   • Drug use: No   • Sexual activity: Defer         Allergies   Allergen Reactions   • Amiodarone Anaphylaxis   • Contrast Dye Anaphylaxis   • Iodine Anaphylaxis   • Multivitamins Anaphylaxis     Oyster calcium   • Proton Pump Inhibitors Shortness Of Breath   • Shellfish-Derived Products Anaphylaxis   • Dexamethasone Other (See Comments)     hiccups   • Livalo [Pitavastatin] Swelling   • Plavix [Clopidogrel Bisulfate] Other (See Comments)     Excessive bleeding   • Statins Swelling   • Tetracyclines & Related Other (See Comments)     \"skin falls off\"   • Penicillins Rash         Current Facility-Administered Medications   Medication Dose Route Frequency Provider Last Rate Last Dose   • acetaminophen (TYLENOL) tablet 650 mg  650 mg Oral Q6H PRN Ryan Sullivan MD       • aspirin EC tablet 81 mg  81 mg Oral Daily Ryan Sullivan MD       • bisacodyl (DULCOLAX) EC tablet 5 mg  5 mg Oral Daily PRN Ryan Sullivan MD       • dextrose (D50W) 25 g/ 50mL Intravenous Solution 25 g  25 g Intravenous Q15 Min PRN Ryan Sullivan MD       • dextrose (GLUTOSE) oral gel 15 g  15 g Oral Q15 Min PRN Ryan Sullivan MD       • enoxaparin (LOVENOX) syringe 40 mg  40 mg Subcutaneous Q24H Ryan Sullivan MD       • [START ON 2019] famotidine (PEPCID) tablet 40 mg  40 mg Oral Daily Ryan Sullivan MD       • glucagon (human recombinant) (GLUCAGEN DIAGNOSTIC) injection 1 mg  1 mg Subcutaneous PRN Ryan Sullivan MD       • hydrALAZINE (APRESOLINE) injection 10 mg  10 mg Intravenous Q6H PRN Ryan Sullivan MD       • insulin aspart (novoLOG) injection 0-14 Units  0-14 Units Subcutaneous 4x Daily AC & at Bedtime Ryan Sullivan MD       • insulin aspart (novoLOG) injection 22 Units  22 Units " "Subcutaneous BID Ryan Sullivan MD       • insulin NPH (humuLIN N,novoLIN N) injection 42 Units  42 Units Subcutaneous Q12H Ryan Sullivan MD       • losartan (COZAAR) tablet 50 mg  50 mg Oral Q24H Ryan Sullivan MD   50 mg at 06/07/19 1729   • nitroglycerin (NITROSTAT) SL tablet 0.4 mg  0.4 mg Sublingual Q5 Min PRN Ryan Sullivan MD       • ondansetron (ZOFRAN) injection 4 mg  4 mg Intravenous Q6H PRN Ryan Sullivan MD       • oxyCODONE-acetaminophen (PERCOCET)  MG per tablet 1 tablet  1 tablet Oral Q6H PRN Ryan Sullivan MD       • sodium chloride 0.9 % flush 3 mL  3 mL Intravenous Q12H Ryan Sullivan MD   3 mL at 06/07/19 1511   • sodium chloride 0.9 % flush 3-10 mL  3-10 mL Intravenous PRN Ryan Sullivan MD       • sodium chloride 0.9 % infusion  100 mL/hr Intravenous Continuous Ryan Sullivan  mL/hr at 06/07/19 1729 100 mL/hr at 06/07/19 1729         OBJECTIVE    /67 (BP Location: Right arm, Patient Position: Sitting)   Pulse 75   Temp 96.5 °F (35.8 °C) (Oral)   Resp 18   Ht 172.7 cm (68\")   Wt 101 kg (222 lb 2 oz)   SpO2 96%   BMI 33.77 kg/m²       Review of Systems :    Constitutional:  Denies recent weight loss, weight gain, fever or chills, no change in exercise tolerance.     HENT:  Denies any hearing loss, epistaxis, hoarseness, or difficulty speaking.     Eyes: Wears eyeglasses or contact lenses .    Respiratory:  Dyspnea with exertion,no cough, wheezing, or hemoptysis.     Cardiovascular: See HPI    Gastrointestinal:  Denies change in bowel habits, dyspepsia, ulcer disease, hematochezia, or melena.     Endocrine: Negative for cold intolerance, heat intolerance, polydipsia, polyphagia and polyuria. Denies any history of weight change, heat/cold intolerance, polydipsia, polyuria.     Genitourinary: BPH      Musculoskeletal: HPI.  History of chronic sternal pain    Skin:  Denies any change in hair or nails, rashes, or skin lesions. "     Allergic/Immunologic: Negative.  Negative for environmental allergies, food allergies and immunocompromised state.     Neurological:  Denies any history of recurrent headaches, strokes, TIA, or seizure disorder.     Hematological: Denies any food allergies, seasonal allergies, bleeding disorders, or lymphadenopathy.     Psychiatric/Behavioral: Anxiety      Physical Exam:     Constitutional: Cooperative, alert and oriented, in no acute distress.     HENT:   Head: Normocephalic, normal hair patterns, no masses or tenderness.  Ears, Nose, and Throat: No gross abnormalities. No pallor or cyanosis. Dentition good.   Eyes: EOMS intact, PERRL, conjunctivae and lids unremarkable. Fundoscopic exam and visual fields not performed.   Neck: No palpable masses or adenopathy, no thyromegaly, no JVD, carotid pulses are full and equal bilaterally and without bruit.     Cardiovascular: Regular rhythm, S1 and S2 normal, no S3 or S4. Apical impulse not displaced. No murmurs, gallops, or rubs detected.     Pulmonary/Chest: Chest: normal symmetry, Sternal  tenderness to palpation, normal respiratory excursion, no intercostal retraction, no use of accessory muscles. Pulmonary: Normal breath sounds - no rales or rhonchi.    Abdominal: Abdomen soft, bowel sounds normoactive, no masses, no hepatosplenomegaly, non-tender, no bruits.     Musculoskeletal: No deformities, clubbing, cyanosis, erythema, or edema observed. There are no spinal abnormalities noted. Normal muscle strength and tone. Pulses full and equal in all extremities, no bruits auscultated.     Neurological: No gross motor or sensory deficits noted, Cranial nerves 2-12 normal. affect appropriate, oriented to time, person, place.     Skin: Warm and dry to the touch, no apparent skin lesions or masses noted.     Psychiatric: Normal mood and affect. Behavior is normal. Judgment and thought content normal.     RESULTS  Lab Results (last 24 hours)     Procedure Component Value  Units Date/Time    Comprehensive Metabolic Panel [[id number]] Collected:  06/07/19 1446    Specimen:  Blood Updated:  06/07/19 1522     Glucose 71 mg/dL      BUN 16 mg/dL      Creatinine 0.94 mg/dL      Sodium 145 mmol/L      Potassium 4.4 mmol/L      Chloride 105 mmol/L      CO2 29.0 mmol/L      Calcium 9.7 mg/dL      Total Protein 6.9 g/dL      Albumin 4.20 g/dL      ALT (SGPT) 14 U/L      AST (SGOT) 14 U/L      Alkaline Phosphatase 58 U/L      Total Bilirubin 0.3 mg/dL      eGFR Non African Amer 82 mL/min/1.73      Globulin 2.7 gm/dL      A/G Ratio 1.6 g/dL      BUN/Creatinine Ratio 17.0     Anion Gap 11.0 mmol/L     Narrative:       GFR Normal >60  Chronic Kidney Disease <60  Kidney Failure <15    Lipid Panel [[id number]]  (Abnormal) Collected:  06/07/19 1446    Specimen:  Blood Updated:  06/07/19 1519     Total Cholesterol 224 mg/dL      Triglycerides 120 mg/dL      HDL Cholesterol 48 mg/dL      LDL Cholesterol  152 mg/dL      VLDL Cholesterol 24 mg/dL      LDL/HDL Ratio 3.17    Narrative:       Cholesterol Reference Ranges  (U.S. Department of Health and Human Services ATP III Classifications)    Desirable          <200 mg/dL  Borderline High    200-239 mg/dL  High Risk          >240 mg/dL      Triglyceride Reference Ranges  (U.S. Department of Health and Human Services ATP III Classifications)    Normal           <150 mg/dL  Borderline High  150-199 mg/dL  High             200-499 mg/dL  Very High        >500 mg/dL    HDL Reference Ranges  (U.S. Department of Health and Human Services ATP III Classifcations)    Low     <40 mg/dl (major risk factor for CHD)  High    >60 mg/dl ('negative' risk factor for CHD)        LDL Reference Ranges  (U.S. Department of Health and Human Services ATP III Classifcations)    Optimal          <100 mg/dL  Near Optimal     100-129 mg/dL  Borderline High  130-159 mg/dL  High             160-189 mg/dL  Very High        >189 mg/dL    Protime-INR [[id number]]  (Normal) Collected:   06/07/19 1446    Specimen:  Blood Updated:  06/07/19 1515     Protime 12.0 Seconds      INR 0.90    Narrative:       Therapeutic range for most indications is 2.0-3.0 INR,  or 2.5-3.5 for mechanical heart valves.    Troponin [[id number]]  (Normal) Collected:  06/07/19 1446    Specimen:  Blood Updated:  06/07/19 1514     Troponin T <0.010 ng/mL     Narrative:       Troponin T Reference Range:  <= 0.03 ng/mL-   Negative for AMI  >0.03 ng/mL-     Abnormal for myocardial necrosis.  Clinicians would have to utilize clinical acumen, EKG, Troponin and serial changes to determine if it is an Acute Myocardial Infarction or myocardial injury due to an underlying chronic condition.     Hemoglobin A1c [[id number]]  (Abnormal) Collected:  06/07/19 1446    Specimen:  Blood Updated:  06/07/19 1508     Hemoglobin A1C 6.00 %     Narrative:       Hemoglobin A1C Ranges:    Increased Risk for Diabetes  5.7% to 6.4%  Diabetes                     >= 6.5%  Diabetic Goal                < 7.0%        Imaging Results (last 24 hours)     Procedure Component Value Units Date/Time    XR Chest PA & Lateral [[id number]] Collected:  06/07/19 1427     Updated:  06/07/19 1733    Narrative:         [facility] [place]    Patient Name: SPEARSPALOMO    ATTENDING: BRIAN CHEUNG     REFERRING: BRIAN CHEUNG    ORDERING: BRIAN CHEUNG    -----------------------    PROCEDURE: Chest, PA and lateral    DATE OF EXAM: 6/7/2019    HISTORY: Mid chest pain    PA and lateral views of the chest were obtained. Study is  compared to prior chest of 3/7/2019.    The lungs are satisfactorily expanded and clear of infiltrates or  effusions. Mild chronic elevation of left hemidiaphragm with  scarring in the left base is unchanged from prior exam. The heart  size is normal. The vasculature does not appear congested and  costophrenic angles are clear. Sternal hardware is seen from  previous surgery.      Impression:       Chronic changes, no active  disease.      Electronically signed by:  Ricky Nava MD  6/7/2019 5:32 PM CDT  Workstation: [id number]            ASSESSMENT AND PLAN    Paddy Brantley is a 58-year-old male with multiple medical issues as discussed in detail under history of present illness and has presented with chest pain in the background of known coronary artery disease status post CABG in 2016, sternal pain including sternal fracture, multiple medication allergies/intolerance/ ? Compliance and ongoing tobacco use.  There is no objective evidence of myocardial injury at the present time.  I had a long discussion with him about different treatment options and the patient has had life-threatening reaction to iodine-containing contrast in the past.  Hence, understandably, he is reluctant to undergo any testing involving contrast.  I agree with the addition of losartan 50 mg for optimization of blood pressure and Cardizem  mg daily in p.m. may be continued.  Antiplatelet therapy with aspirin may be continued.  Though he would benefit from statins or PCSK9 inhibitors, has been unable to tolerate this medication.  Smoking cessation was stressed  The plan would be to arrange for a Lexiscan Cardiolite stress test as an outpatient if cardiac enzymes are consistently negative for myocardial injury.  Though nitrates could be considered, the patient uses Cialis 5 mg daily for BPH on a regular basis and does not wish to discontinue this.  Further recommendations will follow.  Thank you for asked me to see this patient.    Gordo Resendez MD  6/7/2019  5:34 PM

## 2019-06-07 NOTE — H&P
[facility]  INPATIENT HISTORY AND PHYSICAL       Patient Care Team:  Nick Modi MD as PCP - General    Chief complaint: Chest pain.    Subjective     Patient is a 58 y.o. male with history of coronary artery disease status post PCI, status post CABG in 2016, GERD, diabetes mellitus, chronic pain syndrome, nicotine dependence who was transferred from [facility] as a direct admission to [facility] due to chest pain.  Patient started having retrosternal chest pain on his way to work this morning.  Pain was nonradiating and was associated with shortness of air and diaphoresis.  Patient denies nausea, vomiting, hematemesis, melena, hematochezia palpitation, syncope or presyncope.    His initial troponin was negative and EKG did not show any acute changes.  Patient became chest pain-free while still at the emergency department and remained asymptomatic thereafter.       Review of Systems   Constitutional: Positive for diaphoresis. Negative for activity change, appetite change, chills, fatigue and fever.   HENT: Negative for trouble swallowing and voice change.    Eyes: Negative for photophobia and visual disturbance.   Respiratory: Positive for shortness of breath. Negative for cough, choking, chest tightness, wheezing and stridor.    Cardiovascular: Positive for chest pain. Negative for palpitations and leg swelling.   Gastrointestinal: Negative for abdominal distention, abdominal pain, blood in stool, constipation, diarrhea, nausea and vomiting.   Endocrine: Negative for cold intolerance, heat intolerance, polydipsia, polyphagia and polyuria.   Genitourinary: Negative for decreased urine volume, difficulty urinating, dysuria, enuresis, flank pain, frequency, hematuria and urgency.   Musculoskeletal: Negative for arthralgias, gait problem, myalgias, neck pain and neck stiffness.   Skin: Negative for pallor, rash and wound.    Neurological: Negative for dizziness, tremors, seizures, syncope, facial asymmetry, speech difficulty, weakness, light-headedness, numbness and headaches.   Hematological: Does not bruise/bleed easily.   Psychiatric/Behavioral: Negative for agitation, behavioral problems and confusion.       Family and social history: Patient currently lives with his mother and at baseline able to take care of his activities of daily living.  He smokes about 4 sticks of cigarettes a day and denies history of alcohol use.  There is family history of heart disease.    History  Past Medical History:   Diagnosis Date   • Accident caused by hypodermic needle    • Guerra esophagus    • CAD (coronary artery disease)    • Contact with and (suspected) exposure to potentially hazardous body fluids    • Diabetes mellitus (CMS/HCC)    • GERD (gastroesophageal reflux disease)    • Kidney stones    • Meniere's disease    • MRSA infection     right ing hernia   • PAD (peripheral artery disease) (CMS/HCC)      Past Surgical History:   Procedure Laterality Date   • APPENDECTOMY     • CARDIAC CATHETERIZATION     • CARDIAC SURGERY  2016    CABG   • CHEST TUBE INSERTION     • CORONARY ARTERY BYPASS GRAFT     • GALLBLADDER SURGERY     • INCISION AND DRAINAGE ABSCESS     • INGUINAL HERNIA REPAIR Bilateral    • LUMBAR LAMINECTOMY     • STERNAL REWIRING N/A 2017    Procedure: REPAIR STERNAL DEHISCENCE WITH STERNAL DEBRIDEMENT  Need plate and screws;  Surgeon: Homero Sutton MD;  Location: [facility];  Service:    • UMBILICAL HERNIA REPAIR       No family history on file.  Social History     Tobacco Use   • Smoking status: Current Every Day Smoker     Packs/day: 0.50     Years: 40.00     Pack years: 20.00     Types: Cigarettes     Last attempt to quit: 1/3/2017     Years since quittin.4   • Smokeless tobacco: Never Used   Substance Use Topics   • Alcohol use: No   • Drug use: No     Medications Prior to Admission   Medication Sig  Dispense Refill Last Dose   • ascorbic acid (VITAMIN C) 1000 MG tablet Take 1,000 mg by mouth 2 (Two) Times a Day.   6/6/2019 at Unknown time   • aspirin 325 MG tablet Take 325 mg by mouth Every 12 (Twelve) Hours.   6/7/2019 at Unknown time   • Biotin 5000 MCG tablet Take 5,000 mcg by mouth Daily. 2 tablets   6/6/2019 at Unknown time   • CARTIA  MG 24 hr capsule TAKE 1 CAPSULE BY MOUTH EVERY NIGHT 90 capsule 0 6/6/2019 at Unknown time   • diphenhydrAMINE (BENADRYL) 25 mg capsule Take 25 mg by mouth Every 6 (Six) Hours As Needed for Itching.   Past Week at Unknown time   • insulin NPH (HUMULIN N) 100 UNIT/ML injection Inject 42 Units under the skin 2 (Two) Times a Day.   6/6/2019 at Unknown time   • NOVOLIN R 100 UNIT/ML injection Inject 22 Units as directed 2 (Two) Times a Day.   6/6/2019 at Unknown time   • oxyCODONE-acetaminophen (PERCOCET)  MG per tablet Take 1 tablet by mouth Every 6 (Six) Hours As Needed for Moderate Pain . 15 tablet 0 6/6/2019 at Unknown time   • raNITIdine (ZANTAC) 300 MG tablet Take 300 mg by mouth 2 (Two) Times a Day.   6/7/2019 at Unknown time   • Coenzyme Q10 (COQ10) 100 MG capsule Take 1 tablet by mouth Daily.   Unknown at Unknown time   • ibuprofen (ADVIL,MOTRIN) 200 MG tablet Take 200 mg by mouth Every 6 (Six) Hours As Needed for Mild Pain .   Unknown at Unknown time     Allergies:  Amiodarone; Contrast dye; Iodine; Multivitamins; Proton pump inhibitors; Shellfish-derived products; Dexamethasone; Livalo [pitavastatin]; Plavix [clopidogrel bisulfate]; Statins; Tetracyclines & related; and Penicillins  Prior to Admission medications    Medication Sig Start Date End Date Taking? Authorizing Provider   ascorbic acid (VITAMIN C) 1000 MG tablet Take 1,000 mg by mouth 2 (Two) Times a Day.   Yes ProviderJaylen MD   aspirin 325 MG tablet Take 325 mg by mouth Every 12 (Twelve) Hours.   Yes ProviderJaylen MD   Biotin 5000 MCG tablet Take 5,000 mcg by mouth Daily. 2  tablets   Yes Jaylen Ghotra MD   CARTIA  MG 24 hr capsule TAKE 1 CAPSULE BY MOUTH EVERY NIGHT 1/15/19  Yes Gordo Resendez MD   diphenhydrAMINE (BENADRYL) 25 mg capsule Take 25 mg by mouth Every 6 (Six) Hours As Needed for Itching.   Yes Jaylen Ghotra MD   insulin NPH (HUMULIN N) 100 UNIT/ML injection Inject 42 Units under the skin 2 (Two) Times a Day.   Yes Jaylen Ghotra MD   NOVOLIN R 100 UNIT/ML injection Inject 22 Units as directed 2 (Two) Times a Day. 10/19/17  Yes Jaylen Ghotra MD   oxyCODONE-acetaminophen (PERCOCET)  MG per tablet Take 1 tablet by mouth Every 6 (Six) Hours As Needed for Moderate Pain . 2/12/19  Yes Skip Bojorquez MD   raNITIdine (ZANTAC) 300 MG tablet Take 300 mg by mouth 2 (Two) Times a Day.   Yes Jaylen Ghotra MD   Coenzyme Q10 (COQ10) 100 MG capsule Take 1 tablet by mouth Daily.    Jaylen Ghotra MD   ibuprofen (ADVIL,MOTRIN) 200 MG tablet Take 200 mg by mouth Every 6 (Six) Hours As Needed for Mild Pain .    Jaylen Ghotra MD       Objective        Vital Signs  Temp:  [96.6 °F (35.9 °C)] 96.6 °F (35.9 °C)  Heart Rate:  [55-72] 55  Resp:  [18] 18  BP: (184)/(86) 184/86      Physical Exam   Constitutional: He is oriented to person, place, and time. He appears well-developed and well-nourished. No distress.   HENT:   Head: Normocephalic and atraumatic.   Eyes: EOM are normal. Pupils are equal, round, and reactive to light. No scleral icterus.   Neck: Normal range of motion. Neck supple. No JVD present. No thyromegaly present.   Cardiovascular: Normal rate, regular rhythm and normal heart sounds. Exam reveals no gallop and no friction rub.   No murmur heard.  Pulmonary/Chest: Effort normal and breath sounds normal. He has no wheezes. He has no rales. He exhibits no tenderness.   Abdominal: Soft. Bowel sounds are normal. He exhibits no distension and no mass. There is no tenderness. There is no rebound and no  guarding.   Musculoskeletal: He exhibits no edema, tenderness or deformity.   Neurological: He is alert and oriented to person, place, and time. No cranial nerve deficit. He exhibits normal muscle tone. Coordination normal.   Skin: Skin is warm and dry. No rash noted. He is not diaphoretic. No erythema. No pallor.   Psychiatric: He has a normal mood and affect. His behavior is normal. Judgment and thought content normal.   Nursing note and vitals reviewed.        Results Review: Pending.          Invalid input(s): LABALBU, PROT                    Imaging Results (last 7 days)     ** No results found for the last 168 hours. **          Assessment / Plan       Hospital Problem List:  Active Problems:    Chest pain: Patient be admitted to telemetry to rule out MI and started on guideline directed medical therapy.  He will be started on aspirin and nitrates.  Will hold beta-blocker secondary to relative bradycardia.  Patient is reportedly allergic to statins.  Trend troponin, EKG, check echocardiogram, lipid profile and consult cardiologist.    Hypertension: Patient  is currently not medicated and denies history of hypertension.  Will begin losartan and make adjustments as needed for optimal blood pressure control.    Diabetes mellitus: Continue anti-glycemic with Accu-Cheks and sliding scale insulin.  Check hemoglobin A1c.    Continue Pepcid for GERD and begin DVT prophylaxis.    Nicotine dependence: Nicotine patch was offered to the patient but he declined.  States cessation counseling has been discussed with the patient.    Additional orders and treatment plan as hospital course dictates.      I discussed the patient's findings and my recommendations with patient.     Ryan Sullivan MD  06/07/19  1:04 PM        EMR Dragon/Transcription disclaimer:   Much of this encounter note is an electronic transcription/translation of spoken language to printed text. The electronic translation of spoken language may permit  erroneous, or at times, nonsensical words or phrases to be inadvertently transcribed; Although I have reviewed the note for such errors, some may still exist.

## 2019-06-08 VITALS
BODY MASS INDEX: 33.43 KG/M2 | TEMPERATURE: 97 F | SYSTOLIC BLOOD PRESSURE: 144 MMHG | RESPIRATION RATE: 18 BRPM | OXYGEN SATURATION: 96 % | WEIGHT: 220.6 LBS | DIASTOLIC BLOOD PRESSURE: 68 MMHG | HEIGHT: 68 IN | HEART RATE: 69 BPM

## 2019-06-08 LAB
ANION GAP SERPL CALCULATED.3IONS-SCNC: 9 MMOL/L
BASOPHILS # BLD AUTO: 0.06 10*3/MM3 (ref 0–0.2)
BASOPHILS NFR BLD AUTO: 0.8 % (ref 0–1.5)
BUN BLD-MCNC: 17 MG/DL (ref 8–23)
BUN/CREAT SERPL: 14.7 (ref 7–25)
CALCIUM SPEC-SCNC: 8.6 MG/DL (ref 8.6–10.5)
CHLORIDE SERPL-SCNC: 108 MMOL/L (ref 98–107)
CO2 SERPL-SCNC: 26 MMOL/L (ref 22–29)
CREAT BLD-MCNC: 1.16 MG/DL (ref 0.76–1.27)
DEPRECATED RDW RBC AUTO: 41.7 FL (ref 37–54)
EOSINOPHIL # BLD AUTO: 0.3 10*3/MM3 (ref 0–0.4)
EOSINOPHIL NFR BLD AUTO: 3.9 % (ref 0.3–6.2)
ERYTHROCYTE [DISTWIDTH] IN BLOOD BY AUTOMATED COUNT: 12.7 % (ref 12.3–15.4)
GFR SERPL CREATININE-BSD FRML MDRD: 64 ML/MIN/1.73
GLUCOSE BLD-MCNC: 96 MG/DL (ref 65–99)
GLUCOSE BLDC GLUCOMTR-MCNC: 84 MG/DL (ref 70–130)
HCT VFR BLD AUTO: 44.6 % (ref 37.5–51)
HGB BLD-MCNC: 14.3 G/DL (ref 13–17.7)
IMM GRANULOCYTES # BLD AUTO: 0.03 10*3/MM3 (ref 0–0.05)
IMM GRANULOCYTES NFR BLD AUTO: 0.4 % (ref 0–0.5)
LYMPHOCYTES # BLD AUTO: 2.12 10*3/MM3 (ref 0.7–3.1)
LYMPHOCYTES NFR BLD AUTO: 27.3 % (ref 19.6–45.3)
MCH RBC QN AUTO: 29.1 PG (ref 26.6–33)
MCHC RBC AUTO-ENTMCNC: 32.1 G/DL (ref 31.5–35.7)
MCV RBC AUTO: 90.8 FL (ref 79–97)
MONOCYTES # BLD AUTO: 0.68 10*3/MM3 (ref 0.1–0.9)
MONOCYTES NFR BLD AUTO: 8.8 % (ref 5–12)
NEUTROPHILS # BLD AUTO: 4.58 10*3/MM3 (ref 1.7–7)
NEUTROPHILS NFR BLD AUTO: 58.8 % (ref 42.7–76)
NRBC BLD AUTO-RTO: 0 /100 WBC (ref 0–0.2)
PLATELET # BLD AUTO: 176 10*3/MM3 (ref 140–450)
PMV BLD AUTO: 10.4 FL (ref 6–12)
POTASSIUM BLD-SCNC: 4.3 MMOL/L (ref 3.5–5.2)
RBC # BLD AUTO: 4.91 10*6/MM3 (ref 4.14–5.8)
SODIUM BLD-SCNC: 143 MMOL/L (ref 136–145)
TROPONIN T SERPL-MCNC: <0.01 NG/ML (ref 0–0.03)
WBC NRBC COR # BLD: 7.77 10*3/MM3 (ref 3.4–10.8)

## 2019-06-08 PROCEDURE — 80048 BASIC METABOLIC PNL TOTAL CA: CPT | Performed by: INTERNAL MEDICINE

## 2019-06-08 PROCEDURE — 82962 GLUCOSE BLOOD TEST: CPT

## 2019-06-08 PROCEDURE — 96361 HYDRATE IV INFUSION ADD-ON: CPT

## 2019-06-08 PROCEDURE — 63710000001 INSULIN ISOPHANE HUMAN PER 5 UNITS: Performed by: INTERNAL MEDICINE

## 2019-06-08 PROCEDURE — 84484 ASSAY OF TROPONIN QUANT: CPT | Performed by: INTERNAL MEDICINE

## 2019-06-08 PROCEDURE — 63710000001 INSULIN ASPART PER 5 UNITS: Performed by: INTERNAL MEDICINE

## 2019-06-08 PROCEDURE — G0378 HOSPITAL OBSERVATION PER HR: HCPCS

## 2019-06-08 PROCEDURE — 85025 COMPLETE CBC W/AUTO DIFF WBC: CPT | Performed by: INTERNAL MEDICINE

## 2019-06-08 RX ORDER — LOSARTAN POTASSIUM 50 MG/1
50 TABLET ORAL
Qty: 30 TABLET | Refills: 0 | Status: SHIPPED | OUTPATIENT
Start: 2019-06-08 | End: 2019-07-08 | Stop reason: SDUPTHER

## 2019-06-08 RX ORDER — ACETAMINOPHEN 325 MG/1
650 TABLET ORAL EVERY 6 HOURS PRN
Start: 2019-06-08 | End: 2021-04-12 | Stop reason: HOSPADM

## 2019-06-08 RX ADMIN — LOSARTAN POTASSIUM 50 MG: 50 TABLET, FILM COATED ORAL at 09:29

## 2019-06-08 RX ADMIN — ASPIRIN 81 MG: 81 TABLET, COATED ORAL at 09:29

## 2019-06-08 RX ADMIN — HUMAN INSULIN 42 UNITS: 100 INJECTION, SUSPENSION SUBCUTANEOUS at 09:29

## 2019-06-08 RX ADMIN — SODIUM CHLORIDE 100 ML/HR: 9 INJECTION, SOLUTION INTRAVENOUS at 01:01

## 2019-06-08 RX ADMIN — INSULIN ASPART 22 UNITS: 100 INJECTION, SOLUTION INTRAVENOUS; SUBCUTANEOUS at 09:29

## 2019-06-08 NOTE — DISCHARGE SUMMARY
[facility]  DISCHARGE SUMMARY       Date of Admission: 6/7/2019  Date of Discharge:  6/8/2019  Primary Care Physician: Nick Modi MD    Presenting Problem/History of Present Illness:  Chest pain [R07.9]     Final Discharge Diagnoses:  Active Hospital Problems    Diagnosis   • Chest pain       Consults:   Consults     Date and Time Order Name Status Description    6/7/2019 1301 Inpatient Cardiology Consult            Procedures Performed:                 Pertinent Test Results:    Laboratory Data:   Results from last 7 days   Lab Units 06/08/19  0517 06/07/19  1446   SODIUM mmol/L 143 145   POTASSIUM mmol/L 4.3 4.4   CHLORIDE mmol/L 108* 105   CO2 mmol/L 26.0 29.0   BUN mg/dL 17 16   CREATININE mg/dL 1.16 0.94   GLUCOSE mg/dL 96 71   CALCIUM mg/dL 8.6 9.7   BILIRUBIN mg/dL  --  0.3   ALK PHOS U/L  --  58   ALT (SGPT) U/L  --  14   AST (SGOT) U/L  --  14   ANION GAP mmol/L 9.0 11.0     Estimated Creatinine Clearance: 77.6 mL/min (by C-G formula based on SCr of 1.16 mg/dL).          Results from last 7 days   Lab Units 06/08/19  0517   WBC 10*3/mm3 7.77   HEMOGLOBIN g/dL 14.3   HEMATOCRIT % 44.6   PLATELETS 10*3/mm3 176     Results from last 7 days   Lab Units 06/07/19  1446   INR  0.90       Culture Data:   No results found for: BLOODCX  No results found for: URINECX  No results found for: RESPCX  No results found for: WOUNDCX  No results found for: STOOLCX  No components found for: BODYFLD    Microbiology                            Radiology Data:   Imaging Results (all)     Procedure Component Value Units Date/Time    XR Chest PA & Lateral [[id number]] Collected:  06/07/19 1427     Updated:  06/07/19 1733    Narrative:         [facility] [place]    Patient Name: REGAN PALOMO    ATTENDING: BRIAN CHEUNG     REFERRING: BRIAN CHEUNG    ORDERING: BRIAN CHEUNG    -----------------------    PROCEDURE: Chest, PA and  "lateral    DATE OF EXAM: 6/7/2019    HISTORY: Mid chest pain    PA and lateral views of the chest were obtained. Study is  compared to prior chest of 3/7/2019.    The lungs are satisfactorily expanded and clear of infiltrates or  effusions. Mild chronic elevation of left hemidiaphragm with  scarring in the left base is unchanged from prior exam. The heart  size is normal. The vasculature does not appear congested and  costophrenic angles are clear. Sternal hardware is seen from  previous surgery.      Impression:       Chronic changes, no active disease.      Electronically signed by:  Ricky Nava MD  6/7/2019 5:32 PM CDT  Workstation: [id number]          Chief Complaint on Day of Discharge: none    HPI:from chart \"Patient is a 58 y.o. male with history of coronary artery disease status post PCI, status post CABG in 2016, GERD, diabetes mellitus, chronic pain syndrome, nicotine dependence who was transferred from [facility] as a direct admission to [facility] due to chest pain.  Patient started having retrosternal chest pain on his way to work this morning.  Pain was nonradiating and was associated with shortness of air and diaphoresis.  Patient denies nausea, vomiting, hematemesis, melena, hematochezia palpitation, syncope or presyncope.     His initial troponin was negative and EKG did not show any acute changes.  Patient became chest pain-free while still at the emergency department and remained asymptomatic thereafter.\"      Hospital Course: Patient admitted with chest pain.  Patient was evaluated by cardiology he had serial negative troponins, patient does have severe allergy to contrast.  Cardiology cleared patient for discharge, and to have a stress test on Monday, 6/10/2019.  Patient is agreeable to plan, time of discharge patient was asymptomatic his physical exam was unremarkable.    Condition on Discharge:  Improved and Stable    Physical Exam on Discharge:  Temp:  [96.5 °F " (35.8 °C)-97.9 °F (36.6 °C)] 97 °F (36.1 °C)  Heart Rate:  [47-79] 62  Resp:  [18] 18  BP: (132-184)/(58-86) 132/63    Constitutional: Patient is AAO x 3. Patient appears well-developed and well-nourished.   Cardiovascular: Normal rate, regular rhythm, normal heart sounds and intact distal pulses.  Exam reveals no gallop and no friction rub.  No murmur heard.  Pulmonary/Chest: Effort normal and breath sounds normal. No respiratory distress. No wheezes, rales or rhonchi.  Abdominal: Soft. Bowel sounds are normal. No distension, no tenderness. There is no rebound and no guarding. No hernia.   Musculoskeletal: No cyanosis, clubbing or edema.    Discharge Disposition:  Home or Self Care    Discharge Medications:     Discharge Medications      New Medications      Instructions Start Date   acetaminophen 325 MG tablet  Commonly known as:  TYLENOL   650 mg, Oral, Every 6 Hours PRN      losartan 50 MG tablet  Commonly known as:  COZAAR   50 mg, Oral, Every 24 Hours Scheduled         Continue These Medications      Instructions Start Date   ascorbic acid 1000 MG tablet  Commonly known as:  VITAMIN C   1,000 mg, Oral, 2 Times Daily      aspirin 325 MG tablet   325 mg, Oral, Every 12 Hours Scheduled      Biotin 5000 MCG tablet   5,000 mcg, Oral, Daily, 2 tablets      CARTIA  MG 24 hr capsule  Generic drug:  diltiaZEM CD   180 mg, Oral, Nightly      CoQ10 100 MG capsule   1 tablet, Oral, Daily      diphenhydrAMINE 25 mg capsule  Commonly known as:  BENADRYL   25 mg, Oral, Every 6 Hours PRN      HUMULIN N 100 UNIT/ML injection  Generic drug:  insulin NPH   42 Units, Subcutaneous, 2 Times Daily      NOVOLIN R 100 UNIT/ML injection  Generic drug:  insulin regular   22 Units, Injection, 2 Times Daily      oxyCODONE-acetaminophen  MG per tablet  Commonly known as:  PERCOCET   1 tablet, Oral, Every 6 Hours PRN      raNITIdine 300 MG tablet  Commonly known as:  ZANTAC   300 mg, Oral, 2 Times Daily         Stop These  Medications    ibuprofen 200 MG tablet  Commonly known as:  ADVIL,MOTRIN            Discharge Diet:   Diet Instructions     Diet: Consistent Carbohydrate, Cardiac      Discharge Diet:   Consistent Carbohydrate  Cardiac             Activity at Discharge:   Activity Instructions     Activity as Tolerated            All the abnormal findings were discussed with the patient in detail and the patient verbalized understanding of needing to follow up with PCP and other specialties.  Outpatient follow up for further evaluation and management.    Discharge Care Plan/Instructions: follow up with PCP in 2-3 days.    Follow-up Appointments:   No future appointments.    Test Results Pending at Discharge:      This document has been electronically signed by Mike García MD on June 8, 2019 8:07 AM

## 2019-06-10 ENCOUNTER — DOCUMENTATION (OUTPATIENT)
Dept: CARDIOLOGY | Facility: CLINIC | Age: 59
End: 2019-06-10

## 2019-06-10 NOTE — PROGRESS NOTES
Informed patient per Dr. Weems he does not need to return to work until after his stress test.  Patient understood.

## 2019-06-11 ENCOUNTER — TELEPHONE (OUTPATIENT)
Dept: CARDIOLOGY | Facility: CLINIC | Age: 59
End: 2019-06-11

## 2019-06-12 ENCOUNTER — HOSPITAL ENCOUNTER (OUTPATIENT)
Dept: NUCLEAR MEDICINE | Facility: HOSPITAL | Age: 59
Discharge: HOME | End: 2019-06-12

## 2019-06-12 ENCOUNTER — HOSPITAL ENCOUNTER (OUTPATIENT)
Dept: CARDIOLOGY | Facility: HOSPITAL | Age: 59
Discharge: HOME | End: 2019-06-12

## 2019-06-12 DIAGNOSIS — R07.2 PRECORDIAL PAIN: ICD-10-CM

## 2019-06-12 DIAGNOSIS — I25.110 CORONARY ARTERY DISEASE INVOLVING NATIVE CORONARY ARTERY OF NATIVE HEART WITH UNSTABLE ANGINA PECTORIS (HCC): ICD-10-CM

## 2019-06-12 LAB
BH CV STRESS BP STAGE 1: NORMAL
BH CV STRESS COMMENTS STAGE 1: NORMAL
BH CV STRESS DOSE REGADENOSON STAGE 1: 0.4
BH CV STRESS DURATION MIN STAGE 1: 0
BH CV STRESS DURATION SEC STAGE 1: 10
BH CV STRESS HR STAGE 1: 85
BH CV STRESS PROTOCOL 1: NORMAL
BH CV STRESS RECOVERY BP: NORMAL MMHG
BH CV STRESS RECOVERY HR: 98 BPM
BH CV STRESS STAGE 1: 1
LV EF NUC BP: 67 %
MAXIMAL PREDICTED HEART RATE: 160 BPM
PERCENT MAX PREDICTED HR: 66.25 %
STRESS BASELINE BP: NORMAL MMHG
STRESS BASELINE HR: 85 BPM
STRESS PERCENT HR: 78 %
STRESS POST ESTIMATED WORKLOAD: 1 METS
STRESS POST PEAK BP: NORMAL MMHG
STRESS POST PEAK HR: 106 BPM
STRESS TARGET HR: 136 BPM

## 2019-06-12 PROCEDURE — 93017 CV STRESS TEST TRACING ONLY: CPT

## 2019-06-12 PROCEDURE — A9500 TC99M SESTAMIBI: HCPCS | Performed by: INTERNAL MEDICINE

## 2019-06-12 PROCEDURE — 0 TECHNETIUM SESTAMIBI: Performed by: INTERNAL MEDICINE

## 2019-06-12 PROCEDURE — 78452 HT MUSCLE IMAGE SPECT MULT: CPT | Performed by: INTERNAL MEDICINE

## 2019-06-12 PROCEDURE — 78452 HT MUSCLE IMAGE SPECT MULT: CPT

## 2019-06-12 PROCEDURE — 93018 CV STRESS TEST I&R ONLY: CPT | Performed by: INTERNAL MEDICINE

## 2019-06-12 PROCEDURE — 25010000002 REGADENOSON 0.4 MG/5ML SOLUTION: Performed by: INTERNAL MEDICINE

## 2019-06-12 RX ORDER — 0.9 % SODIUM CHLORIDE 0.9 %
10 VIAL (ML) INJECTION ONCE
Status: COMPLETED | OUTPATIENT
Start: 2019-06-12 | End: 2019-06-12

## 2019-06-12 RX ADMIN — REGADENOSON 0.4 MG: 0.08 INJECTION, SOLUTION INTRAVENOUS at 09:32

## 2019-06-12 RX ADMIN — TECHNETIUM TC 99M SESTAMIBI 1 DOSE: 1 INJECTION INTRAVENOUS at 09:32

## 2019-06-12 RX ADMIN — SODIUM CHLORIDE, PRESERVATIVE FREE 10 ML: 5 INJECTION INTRAVENOUS at 09:32

## 2019-06-12 RX ADMIN — TECHNETIUM TC 99M SESTAMIBI 1 DOSE: 1 INJECTION INTRAVENOUS at 08:15

## 2019-06-13 ENCOUNTER — DOCUMENTATION (OUTPATIENT)
Dept: CARDIOLOGY | Facility: CLINIC | Age: 59
End: 2019-06-13

## 2019-06-27 ENCOUNTER — APPOINTMENT (OUTPATIENT)
Dept: CT IMAGING | Facility: HOSPITAL | Age: 59
End: 2019-06-27

## 2019-06-28 ENCOUNTER — TELEPHONE (OUTPATIENT)
Dept: GENERAL RADIOLOGY | Facility: HOSPITAL | Age: 59
End: 2019-06-28

## 2019-07-02 ENCOUNTER — HOSPITAL ENCOUNTER (OUTPATIENT)
Dept: CT IMAGING | Facility: HOSPITAL | Age: 59
Discharge: HOME | End: 2019-07-02
Admitting: NURSE PRACTITIONER

## 2019-07-02 DIAGNOSIS — S22.23XK CLOSED STERNAL MANUBRIAL DISSOCIATION FRACTURE WITH NONUNION, SUBSEQUENT ENCOUNTER: ICD-10-CM

## 2019-07-02 PROCEDURE — 71250 CT THORAX DX C-: CPT

## 2019-07-08 ENCOUNTER — OFFICE VISIT (OUTPATIENT)
Dept: CARDIAC SURGERY | Facility: CLINIC | Age: 59
End: 2019-07-08

## 2019-07-08 VITALS
BODY MASS INDEX: 32.28 KG/M2 | HEART RATE: 100 BPM | SYSTOLIC BLOOD PRESSURE: 155 MMHG | WEIGHT: 213 LBS | DIASTOLIC BLOOD PRESSURE: 75 MMHG | TEMPERATURE: 97.9 F | HEIGHT: 68 IN | OXYGEN SATURATION: 98 %

## 2019-07-08 DIAGNOSIS — E78.2 MIXED HYPERLIPIDEMIA: ICD-10-CM

## 2019-07-08 DIAGNOSIS — I73.9 PAD (PERIPHERAL ARTERY DISEASE) (HCC): ICD-10-CM

## 2019-07-08 DIAGNOSIS — I25.110 CORONARY ARTERY DISEASE INVOLVING NATIVE CORONARY ARTERY OF NATIVE HEART WITH UNSTABLE ANGINA PECTORIS (HCC): ICD-10-CM

## 2019-07-08 DIAGNOSIS — S22.22XA CLOSED FRACTURE OF BODY OF STERNUM, INITIAL ENCOUNTER: Primary | ICD-10-CM

## 2019-07-08 DIAGNOSIS — I10 ESSENTIAL HYPERTENSION: ICD-10-CM

## 2019-07-08 DIAGNOSIS — N18.2 CHRONIC KIDNEY DISEASE, STAGE 2 (MILD): ICD-10-CM

## 2019-07-08 DIAGNOSIS — E11.8 TYPE 2 DIABETES MELLITUS WITH COMPLICATION, WITH LONG-TERM CURRENT USE OF INSULIN (HCC): ICD-10-CM

## 2019-07-08 DIAGNOSIS — Z79.4 TYPE 2 DIABETES MELLITUS WITH COMPLICATION, WITH LONG-TERM CURRENT USE OF INSULIN (HCC): ICD-10-CM

## 2019-07-08 PROBLEM — E11.9 DIABETES MELLITUS (HCC): Status: ACTIVE | Noted: 2019-07-08

## 2019-07-08 PROCEDURE — 99214 OFFICE O/P EST MOD 30 MIN: CPT | Performed by: THORACIC SURGERY (CARDIOTHORACIC VASCULAR SURGERY)

## 2019-07-08 RX ORDER — LOSARTAN POTASSIUM 50 MG/1
50 TABLET ORAL
Qty: 30 TABLET | Refills: 6 | Status: SHIPPED | OUTPATIENT
Start: 2019-07-08 | End: 2020-12-03

## 2020-09-30 ENCOUNTER — HOSPITAL ENCOUNTER (OUTPATIENT)
Facility: HOSPITAL | Age: 60
Setting detail: OBSERVATION
Discharge: HOME | End: 2020-10-01
Attending: EMERGENCY MEDICINE | Admitting: FAMILY MEDICINE

## 2020-09-30 ENCOUNTER — APPOINTMENT (OUTPATIENT)
Dept: GENERAL RADIOLOGY | Facility: HOSPITAL | Age: 60
End: 2020-09-30

## 2020-09-30 DIAGNOSIS — R07.9 CHEST PAIN, RULE OUT ACUTE MYOCARDIAL INFARCTION: Primary | ICD-10-CM

## 2020-09-30 PROBLEM — I25.118 CORONARY ARTERY DISEASE OF NATIVE HEART WITH STABLE ANGINA PECTORIS (HCC): Status: ACTIVE | Noted: 2020-09-30

## 2020-09-30 PROBLEM — I25.708 CORONARY ARTERY DISEASE OF BYPASS GRAFT OF NATIVE HEART WITH STABLE ANGINA PECTORIS (HCC): Status: ACTIVE | Noted: 2020-09-30

## 2020-09-30 LAB
ALBUMIN SERPL-MCNC: 4.3 G/DL (ref 3.5–5.2)
ALBUMIN/GLOB SERPL: 2 G/DL
ALP SERPL-CCNC: 48 U/L (ref 39–117)
ALT SERPL W P-5'-P-CCNC: 11 U/L (ref 1–41)
ANION GAP SERPL CALCULATED.3IONS-SCNC: 8 MMOL/L (ref 5–15)
ANION GAP SERPL CALCULATED.3IONS-SCNC: 9 MMOL/L (ref 5–15)
AST SERPL-CCNC: 12 U/L (ref 1–40)
BASOPHILS # BLD AUTO: 0.05 10*3/MM3 (ref 0–0.2)
BASOPHILS NFR BLD AUTO: 0.6 % (ref 0–1.5)
BILIRUB SERPL-MCNC: 0.3 MG/DL (ref 0–1.2)
BUN SERPL-MCNC: 22 MG/DL (ref 8–23)
BUN SERPL-MCNC: 24 MG/DL (ref 8–23)
BUN/CREAT SERPL: 20.2 (ref 7–25)
BUN/CREAT SERPL: 22.2 (ref 7–25)
CALCIUM SPEC-SCNC: 9.2 MG/DL (ref 8.6–10.5)
CALCIUM SPEC-SCNC: 9.5 MG/DL (ref 8.6–10.5)
CHLORIDE SERPL-SCNC: 104 MMOL/L (ref 98–107)
CHLORIDE SERPL-SCNC: 105 MMOL/L (ref 98–107)
CHOLEST SERPL-MCNC: 211 MG/DL (ref 0–200)
CHOLEST SERPL-MCNC: 213 MG/DL (ref 0–200)
CK MB SERPL-CCNC: 2.19 NG/ML
CK SERPL-CCNC: 92 U/L (ref 20–200)
CO2 SERPL-SCNC: 29 MMOL/L (ref 22–29)
CO2 SERPL-SCNC: 30 MMOL/L (ref 22–29)
CREAT SERPL-MCNC: 1.08 MG/DL (ref 0.76–1.27)
CREAT SERPL-MCNC: 1.09 MG/DL (ref 0.76–1.27)
DEPRECATED RDW RBC AUTO: 42.3 FL (ref 37–54)
EOSINOPHIL # BLD AUTO: 0.12 10*3/MM3 (ref 0–0.4)
EOSINOPHIL NFR BLD AUTO: 1.4 % (ref 0.3–6.2)
ERYTHROCYTE [DISTWIDTH] IN BLOOD BY AUTOMATED COUNT: 12.9 % (ref 12.3–15.4)
GFR SERPL CREATININE-BSD FRML MDRD: 69 ML/MIN/1.73
GFR SERPL CREATININE-BSD FRML MDRD: 69 ML/MIN/1.73
GLOBULIN UR ELPH-MCNC: 2.2 GM/DL
GLUCOSE BLDC GLUCOMTR-MCNC: 79 MG/DL (ref 70–130)
GLUCOSE SERPL-MCNC: 115 MG/DL (ref 65–99)
GLUCOSE SERPL-MCNC: 96 MG/DL (ref 65–99)
HBA1C MFR BLD: 5.6 % (ref 4.8–5.6)
HCT VFR BLD AUTO: 45.9 % (ref 37.5–51)
HDLC SERPL-MCNC: 43 MG/DL (ref 40–60)
HDLC SERPL-MCNC: 44 MG/DL (ref 40–60)
HGB BLD-MCNC: 15.5 G/DL (ref 13–17.7)
HOLD SPECIMEN: NORMAL
HOLD SPECIMEN: NORMAL
IMM GRANULOCYTES # BLD AUTO: 0.04 10*3/MM3 (ref 0–0.05)
IMM GRANULOCYTES NFR BLD AUTO: 0.5 % (ref 0–0.5)
LDLC SERPL CALC-MCNC: 133 MG/DL (ref 0–100)
LDLC SERPL CALC-MCNC: 144 MG/DL (ref 0–100)
LDLC/HDLC SERPL: 3.01 {RATIO}
LDLC/HDLC SERPL: 3.35 {RATIO}
LYMPHOCYTES # BLD AUTO: 1.85 10*3/MM3 (ref 0.7–3.1)
LYMPHOCYTES NFR BLD AUTO: 21.5 % (ref 19.6–45.3)
MCH RBC QN AUTO: 30.2 PG (ref 26.6–33)
MCHC RBC AUTO-ENTMCNC: 33.8 G/DL (ref 31.5–35.7)
MCV RBC AUTO: 89.5 FL (ref 79–97)
MONOCYTES # BLD AUTO: 0.75 10*3/MM3 (ref 0.1–0.9)
MONOCYTES NFR BLD AUTO: 8.7 % (ref 5–12)
NEUTROPHILS NFR BLD AUTO: 5.78 10*3/MM3 (ref 1.7–7)
NEUTROPHILS NFR BLD AUTO: 67.3 % (ref 42.7–76)
NRBC BLD AUTO-RTO: 0 /100 WBC (ref 0–0.2)
NT-PROBNP SERPL-MCNC: 253 PG/ML (ref 0–900)
PLATELET # BLD AUTO: 173 10*3/MM3 (ref 140–450)
PMV BLD AUTO: 9.6 FL (ref 6–12)
POTASSIUM SERPL-SCNC: 4.2 MMOL/L (ref 3.5–5.2)
POTASSIUM SERPL-SCNC: 4.7 MMOL/L (ref 3.5–5.2)
PROT SERPL-MCNC: 6.5 G/DL (ref 6–8.5)
RBC # BLD AUTO: 5.13 10*6/MM3 (ref 4.14–5.8)
SARS-COV-2 N GENE RESP QL NAA+PROBE: NOT DETECTED
SODIUM SERPL-SCNC: 142 MMOL/L (ref 136–145)
SODIUM SERPL-SCNC: 143 MMOL/L (ref 136–145)
TRIGL SERPL-MCNC: 130 MG/DL (ref 0–150)
TRIGL SERPL-MCNC: 172 MG/DL (ref 0–150)
TROPONIN T SERPL-MCNC: <0.01 NG/ML (ref 0–0.03)
VLDLC SERPL-MCNC: 26 MG/DL
VLDLC SERPL-MCNC: 34.4 MG/DL
WBC # BLD AUTO: 8.59 10*3/MM3 (ref 3.4–10.8)
WHOLE BLOOD HOLD SPECIMEN: NORMAL

## 2020-09-30 PROCEDURE — 25010000002 ONDANSETRON PER 1 MG: Performed by: EMERGENCY MEDICINE

## 2020-09-30 PROCEDURE — 84484 ASSAY OF TROPONIN QUANT: CPT | Performed by: EMERGENCY MEDICINE

## 2020-09-30 PROCEDURE — C9803 HOPD COVID-19 SPEC COLLECT: HCPCS

## 2020-09-30 PROCEDURE — 93005 ELECTROCARDIOGRAM TRACING: CPT | Performed by: STUDENT IN AN ORGANIZED HEALTH CARE EDUCATION/TRAINING PROGRAM

## 2020-09-30 PROCEDURE — 87635 SARS-COV-2 COVID-19 AMP PRB: CPT | Performed by: EMERGENCY MEDICINE

## 2020-09-30 PROCEDURE — 84484 ASSAY OF TROPONIN QUANT: CPT | Performed by: CHIROPRACTOR

## 2020-09-30 PROCEDURE — G0378 HOSPITAL OBSERVATION PER HR: HCPCS

## 2020-09-30 PROCEDURE — 83036 HEMOGLOBIN GLYCOSYLATED A1C: CPT | Performed by: CHIROPRACTOR

## 2020-09-30 PROCEDURE — 99220 PR INITIAL OBSERVATION CARE/DAY 70 MINUTES: CPT | Performed by: CHIROPRACTOR

## 2020-09-30 PROCEDURE — 63710000001 PREDNISONE PER 1 MG: Performed by: STUDENT IN AN ORGANIZED HEALTH CARE EDUCATION/TRAINING PROGRAM

## 2020-09-30 PROCEDURE — 94799 UNLISTED PULMONARY SVC/PX: CPT

## 2020-09-30 PROCEDURE — 83880 ASSAY OF NATRIURETIC PEPTIDE: CPT | Performed by: EMERGENCY MEDICINE

## 2020-09-30 PROCEDURE — 96376 TX/PRO/DX INJ SAME DRUG ADON: CPT

## 2020-09-30 PROCEDURE — 93010 ELECTROCARDIOGRAM REPORT: CPT | Performed by: INTERNAL MEDICINE

## 2020-09-30 PROCEDURE — 25010000002 MORPHINE PER 10 MG: Performed by: EMERGENCY MEDICINE

## 2020-09-30 PROCEDURE — 82962 GLUCOSE BLOOD TEST: CPT

## 2020-09-30 PROCEDURE — 63710000001 INSULIN ASPART PER 5 UNITS: Performed by: STUDENT IN AN ORGANIZED HEALTH CARE EDUCATION/TRAINING PROGRAM

## 2020-09-30 PROCEDURE — 96374 THER/PROPH/DIAG INJ IV PUSH: CPT

## 2020-09-30 PROCEDURE — 94640 AIRWAY INHALATION TREATMENT: CPT

## 2020-09-30 PROCEDURE — 96375 TX/PRO/DX INJ NEW DRUG ADDON: CPT

## 2020-09-30 PROCEDURE — 80053 COMPREHEN METABOLIC PANEL: CPT | Performed by: EMERGENCY MEDICINE

## 2020-09-30 PROCEDURE — 63710000001 INSULIN ISOPHANE HUMAN PER 5 UNITS: Performed by: STUDENT IN AN ORGANIZED HEALTH CARE EDUCATION/TRAINING PROGRAM

## 2020-09-30 PROCEDURE — 82550 ASSAY OF CK (CPK): CPT | Performed by: CHIROPRACTOR

## 2020-09-30 PROCEDURE — 80061 LIPID PANEL: CPT | Performed by: CHIROPRACTOR

## 2020-09-30 PROCEDURE — 99284 EMERGENCY DEPT VISIT MOD MDM: CPT

## 2020-09-30 PROCEDURE — 71046 X-RAY EXAM CHEST 2 VIEWS: CPT

## 2020-09-30 PROCEDURE — 82553 CREATINE MB FRACTION: CPT | Performed by: CHIROPRACTOR

## 2020-09-30 PROCEDURE — 25010000002 MORPHINE PER 10 MG: Performed by: CHIROPRACTOR

## 2020-09-30 PROCEDURE — 85025 COMPLETE CBC W/AUTO DIFF WBC: CPT | Performed by: EMERGENCY MEDICINE

## 2020-09-30 PROCEDURE — 63710000001 PREDNISONE PER 5 MG: Performed by: STUDENT IN AN ORGANIZED HEALTH CARE EDUCATION/TRAINING PROGRAM

## 2020-09-30 PROCEDURE — 93005 ELECTROCARDIOGRAM TRACING: CPT | Performed by: EMERGENCY MEDICINE

## 2020-09-30 PROCEDURE — 80048 BASIC METABOLIC PNL TOTAL CA: CPT | Performed by: CHIROPRACTOR

## 2020-09-30 RX ORDER — IPRATROPIUM BROMIDE AND ALBUTEROL SULFATE 2.5; .5 MG/3ML; MG/3ML
3 SOLUTION RESPIRATORY (INHALATION) ONCE
Status: COMPLETED | OUTPATIENT
Start: 2020-09-30 | End: 2020-09-30

## 2020-09-30 RX ORDER — ASPIRIN 325 MG
325 TABLET ORAL ONCE
Status: COMPLETED | OUTPATIENT
Start: 2020-09-30 | End: 2020-09-30

## 2020-09-30 RX ORDER — LOSARTAN POTASSIUM 50 MG/1
50 TABLET ORAL
Status: DISCONTINUED | OUTPATIENT
Start: 2020-09-30 | End: 2020-10-01 | Stop reason: HOSPADM

## 2020-09-30 RX ORDER — NALOXONE HCL 0.4 MG/ML
0.4 VIAL (ML) INJECTION
Status: DISCONTINUED | OUTPATIENT
Start: 2020-09-30 | End: 2020-10-01

## 2020-09-30 RX ORDER — FAMOTIDINE 40 MG/1
40 TABLET, FILM COATED ORAL DAILY
Status: DISCONTINUED | OUTPATIENT
Start: 2020-09-30 | End: 2020-10-01

## 2020-09-30 RX ORDER — ALBUTEROL SULFATE 90 UG/1
2 AEROSOL, METERED RESPIRATORY (INHALATION) EVERY 4 HOURS PRN
COMMUNITY

## 2020-09-30 RX ORDER — SODIUM CHLORIDE 0.9 % (FLUSH) 0.9 %
10 SYRINGE (ML) INJECTION AS NEEDED
Status: DISCONTINUED | OUTPATIENT
Start: 2020-09-30 | End: 2020-10-01 | Stop reason: HOSPADM

## 2020-09-30 RX ORDER — ONDANSETRON 2 MG/ML
4 INJECTION INTRAMUSCULAR; INTRAVENOUS ONCE
Status: COMPLETED | OUTPATIENT
Start: 2020-09-30 | End: 2020-09-30

## 2020-09-30 RX ORDER — TADALAFIL 20 MG/1
5 TABLET ORAL
COMMUNITY
End: 2021-07-01

## 2020-09-30 RX ORDER — DILTIAZEM HYDROCHLORIDE 180 MG/1
180 CAPSULE, COATED, EXTENDED RELEASE ORAL
Status: DISCONTINUED | OUTPATIENT
Start: 2020-09-30 | End: 2020-10-01 | Stop reason: HOSPADM

## 2020-09-30 RX ORDER — SODIUM CHLORIDE 0.9 % (FLUSH) 0.9 %
10 SYRINGE (ML) INJECTION EVERY 12 HOURS SCHEDULED
Status: DISCONTINUED | OUTPATIENT
Start: 2020-09-30 | End: 2020-10-01 | Stop reason: HOSPADM

## 2020-09-30 RX ORDER — MORPHINE SULFATE 2 MG/ML
2 INJECTION, SOLUTION INTRAMUSCULAR; INTRAVENOUS EVERY 4 HOURS PRN
Status: DISCONTINUED | OUTPATIENT
Start: 2020-09-30 | End: 2020-10-01

## 2020-09-30 RX ORDER — DIPHENHYDRAMINE HYDROCHLORIDE 50 MG/ML
50 INJECTION INTRAMUSCULAR; INTRAVENOUS ONCE
Status: COMPLETED | OUTPATIENT
Start: 2020-10-01 | End: 2020-10-01

## 2020-09-30 RX ORDER — NITROGLYCERIN 0.4 MG/1
0.4 TABLET SUBLINGUAL
Status: DISCONTINUED | OUTPATIENT
Start: 2020-09-30 | End: 2020-10-01 | Stop reason: HOSPADM

## 2020-09-30 RX ADMIN — HUMAN INSULIN 42 UNITS: 100 INJECTION, SUSPENSION SUBCUTANEOUS at 23:16

## 2020-09-30 RX ADMIN — DILTIAZEM HYDROCHLORIDE 180 MG: 180 CAPSULE, COATED, EXTENDED RELEASE ORAL at 23:15

## 2020-09-30 RX ADMIN — NITROGLYCERIN 0.5 INCH: 20 OINTMENT TOPICAL at 09:18

## 2020-09-30 RX ADMIN — INSULIN ASPART 22 UNITS: 100 INJECTION, SOLUTION INTRAVENOUS; SUBCUTANEOUS at 23:22

## 2020-09-30 RX ADMIN — PREDNISONE 50 MG: 20 TABLET ORAL at 23:15

## 2020-09-30 RX ADMIN — LOSARTAN POTASSIUM 50 MG: 50 TABLET, FILM COATED ORAL at 15:29

## 2020-09-30 RX ADMIN — ONDANSETRON HYDROCHLORIDE 4 MG: 2 INJECTION, SOLUTION INTRAMUSCULAR; INTRAVENOUS at 08:37

## 2020-09-30 RX ADMIN — IPRATROPIUM BROMIDE AND ALBUTEROL SULFATE 3 ML: .5; 3 SOLUTION RESPIRATORY (INHALATION) at 08:47

## 2020-09-30 RX ADMIN — ASPIRIN 325 MG: 325 TABLET, COATED ORAL at 08:36

## 2020-09-30 RX ADMIN — MORPHINE SULFATE 2 MG: 2 INJECTION, SOLUTION INTRAMUSCULAR; INTRAVENOUS at 15:25

## 2020-09-30 RX ADMIN — MORPHINE SULFATE 2 MG: 2 INJECTION, SOLUTION INTRAMUSCULAR; INTRAVENOUS at 21:35

## 2020-09-30 RX ADMIN — MORPHINE SULFATE 4 MG: 4 INJECTION, SOLUTION INTRAMUSCULAR; INTRAVENOUS at 08:36

## 2020-10-01 ENCOUNTER — APPOINTMENT (OUTPATIENT)
Dept: NUCLEAR MEDICINE | Facility: HOSPITAL | Age: 60
End: 2020-10-01

## 2020-10-01 ENCOUNTER — APPOINTMENT (OUTPATIENT)
Dept: CARDIOLOGY | Facility: HOSPITAL | Age: 60
End: 2020-10-01

## 2020-10-01 VITALS
HEIGHT: 68 IN | BODY MASS INDEX: 33.04 KG/M2 | RESPIRATION RATE: 18 BRPM | DIASTOLIC BLOOD PRESSURE: 65 MMHG | WEIGHT: 218 LBS | OXYGEN SATURATION: 97 % | TEMPERATURE: 97.9 F | HEART RATE: 76 BPM | SYSTOLIC BLOOD PRESSURE: 161 MMHG

## 2020-10-01 LAB
ANION GAP SERPL CALCULATED.3IONS-SCNC: 11 MMOL/L (ref 5–15)
BASOPHILS # BLD AUTO: 0.03 10*3/MM3 (ref 0–0.2)
BASOPHILS NFR BLD AUTO: 0.4 % (ref 0–1.5)
BH CV STRESS BP STAGE 1: NORMAL
BH CV STRESS COMMENTS STAGE 1: NORMAL
BH CV STRESS DOSE REGADENOSON STAGE 1: 0.4
BH CV STRESS DURATION MIN STAGE 1: 3
BH CV STRESS DURATION SEC STAGE 1: 0
BH CV STRESS GRADE STAGE 1: 10
BH CV STRESS HR STAGE 1: 96
BH CV STRESS METS STAGE 1: 5
BH CV STRESS PROTOCOL 1: NORMAL
BH CV STRESS RECOVERY BP: NORMAL MMHG
BH CV STRESS RECOVERY HR: 96 BPM
BH CV STRESS SPEED STAGE 1: 1.7
BH CV STRESS STAGE 1: 1
BUN SERPL-MCNC: 25 MG/DL (ref 8–23)
BUN/CREAT SERPL: 21.4 (ref 7–25)
CALCIUM SPEC-SCNC: 9.6 MG/DL (ref 8.6–10.5)
CHLORIDE SERPL-SCNC: 103 MMOL/L (ref 98–107)
CO2 SERPL-SCNC: 25 MMOL/L (ref 22–29)
CREAT SERPL-MCNC: 1.17 MG/DL (ref 0.76–1.27)
DEPRECATED RDW RBC AUTO: 42 FL (ref 37–54)
EOSINOPHIL # BLD AUTO: 0.02 10*3/MM3 (ref 0–0.4)
EOSINOPHIL NFR BLD AUTO: 0.3 % (ref 0.3–6.2)
ERYTHROCYTE [DISTWIDTH] IN BLOOD BY AUTOMATED COUNT: 12.8 % (ref 12.3–15.4)
GFR SERPL CREATININE-BSD FRML MDRD: 63 ML/MIN/1.73
GLUCOSE BLDC GLUCOMTR-MCNC: 125 MG/DL (ref 70–130)
GLUCOSE BLDC GLUCOMTR-MCNC: 128 MG/DL (ref 70–130)
GLUCOSE BLDC GLUCOMTR-MCNC: 223 MG/DL (ref 70–130)
GLUCOSE BLDC GLUCOMTR-MCNC: 240 MG/DL (ref 70–130)
GLUCOSE SERPL-MCNC: 132 MG/DL (ref 65–99)
HCT VFR BLD AUTO: 49.9 % (ref 37.5–51)
HGB BLD-MCNC: 16.5 G/DL (ref 13–17.7)
IMM GRANULOCYTES # BLD AUTO: 0.05 10*3/MM3 (ref 0–0.05)
IMM GRANULOCYTES NFR BLD AUTO: 0.7 % (ref 0–0.5)
LV EF NUC BP: 65 %
LYMPHOCYTES # BLD AUTO: 1 10*3/MM3 (ref 0.7–3.1)
LYMPHOCYTES NFR BLD AUTO: 13.6 % (ref 19.6–45.3)
MAXIMAL PREDICTED HEART RATE: 158 BPM
MCH RBC QN AUTO: 29.5 PG (ref 26.6–33)
MCHC RBC AUTO-ENTMCNC: 33.1 G/DL (ref 31.5–35.7)
MCV RBC AUTO: 89.1 FL (ref 79–97)
MONOCYTES # BLD AUTO: 0.1 10*3/MM3 (ref 0.1–0.9)
MONOCYTES NFR BLD AUTO: 1.4 % (ref 5–12)
NEUTROPHILS NFR BLD AUTO: 6.13 10*3/MM3 (ref 1.7–7)
NEUTROPHILS NFR BLD AUTO: 83.6 % (ref 42.7–76)
NRBC BLD AUTO-RTO: 0 /100 WBC (ref 0–0.2)
PERCENT MAX PREDICTED HR: 65.82 %
PLATELET # BLD AUTO: 179 10*3/MM3 (ref 140–450)
PMV BLD AUTO: 9.8 FL (ref 6–12)
POTASSIUM SERPL-SCNC: 4.8 MMOL/L (ref 3.5–5.2)
RBC # BLD AUTO: 5.6 10*6/MM3 (ref 4.14–5.8)
SODIUM SERPL-SCNC: 139 MMOL/L (ref 136–145)
STRESS BASELINE BP: NORMAL MMHG
STRESS BASELINE HR: 74 BPM
STRESS PERCENT HR: 77 %
STRESS POST PEAK BP: NORMAL MMHG
STRESS POST PEAK HR: 104 BPM
STRESS TARGET HR: 134 BPM
WBC # BLD AUTO: 7.33 10*3/MM3 (ref 3.4–10.8)

## 2020-10-01 PROCEDURE — 78452 HT MUSCLE IMAGE SPECT MULT: CPT

## 2020-10-01 PROCEDURE — 25010000002 REGADENOSON 0.4 MG/5ML SOLUTION: Performed by: NURSE PRACTITIONER

## 2020-10-01 PROCEDURE — 80048 BASIC METABOLIC PNL TOTAL CA: CPT | Performed by: CHIROPRACTOR

## 2020-10-01 PROCEDURE — G0378 HOSPITAL OBSERVATION PER HR: HCPCS

## 2020-10-01 PROCEDURE — 93017 CV STRESS TEST TRACING ONLY: CPT

## 2020-10-01 PROCEDURE — 63710000001 PREDNISONE PER 5 MG: Performed by: STUDENT IN AN ORGANIZED HEALTH CARE EDUCATION/TRAINING PROGRAM

## 2020-10-01 PROCEDURE — 25010000002 DIPHENHYDRAMINE PER 50 MG: Performed by: STUDENT IN AN ORGANIZED HEALTH CARE EDUCATION/TRAINING PROGRAM

## 2020-10-01 PROCEDURE — A9500 TC99M SESTAMIBI: HCPCS | Performed by: CHIROPRACTOR

## 2020-10-01 PROCEDURE — 78452 HT MUSCLE IMAGE SPECT MULT: CPT | Performed by: INTERNAL MEDICINE

## 2020-10-01 PROCEDURE — 63710000001 INSULIN ISOPHANE HUMAN PER 5 UNITS: Performed by: STUDENT IN AN ORGANIZED HEALTH CARE EDUCATION/TRAINING PROGRAM

## 2020-10-01 PROCEDURE — 63710000001 PREDNISONE PER 1 MG: Performed by: STUDENT IN AN ORGANIZED HEALTH CARE EDUCATION/TRAINING PROGRAM

## 2020-10-01 PROCEDURE — 99214 OFFICE O/P EST MOD 30 MIN: CPT | Performed by: NURSE PRACTITIONER

## 2020-10-01 PROCEDURE — 99232 SBSQ HOSP IP/OBS MODERATE 35: CPT | Performed by: CHIROPRACTOR

## 2020-10-01 PROCEDURE — 93005 ELECTROCARDIOGRAM TRACING: CPT | Performed by: NURSE PRACTITIONER

## 2020-10-01 PROCEDURE — 82962 GLUCOSE BLOOD TEST: CPT

## 2020-10-01 PROCEDURE — 0 TECHNETIUM SESTAMIBI: Performed by: CHIROPRACTOR

## 2020-10-01 PROCEDURE — 85025 COMPLETE CBC W/AUTO DIFF WBC: CPT | Performed by: CHIROPRACTOR

## 2020-10-01 PROCEDURE — 93018 CV STRESS TEST I&R ONLY: CPT | Performed by: INTERNAL MEDICINE

## 2020-10-01 PROCEDURE — 93010 ELECTROCARDIOGRAM REPORT: CPT | Performed by: INTERNAL MEDICINE

## 2020-10-01 PROCEDURE — 93016 CV STRESS TEST SUPVJ ONLY: CPT | Performed by: INTERNAL MEDICINE

## 2020-10-01 RX ORDER — SODIUM CHLORIDE 0.9 % (FLUSH) 0.9 %
10 SYRINGE (ML) INJECTION ONCE
Status: COMPLETED | OUTPATIENT
Start: 2020-10-01 | End: 2020-10-01

## 2020-10-01 RX ADMIN — Medication 10 ML: at 00:22

## 2020-10-01 RX ADMIN — PREDNISONE 50 MG: 20 TABLET ORAL at 07:51

## 2020-10-01 RX ADMIN — DIPHENHYDRAMINE HYDROCHLORIDE 50 MG: 50 INJECTION INTRAMUSCULAR; INTRAVENOUS at 07:51

## 2020-10-01 RX ADMIN — LOSARTAN POTASSIUM 50 MG: 50 TABLET, FILM COATED ORAL at 09:43

## 2020-10-01 RX ADMIN — TECHNETIUM TC 99M SESTAMIBI 1 DOSE: 1 INJECTION INTRAVENOUS at 09:09

## 2020-10-01 RX ADMIN — HUMAN INSULIN 42 UNITS: 100 INJECTION, SUSPENSION SUBCUTANEOUS at 07:23

## 2020-10-01 RX ADMIN — REGADENOSON 0.4 MG: 0.08 INJECTION, SOLUTION INTRAVENOUS at 09:08

## 2020-10-01 RX ADMIN — PREDNISONE 50 MG: 20 TABLET ORAL at 03:07

## 2020-10-01 RX ADMIN — TECHNETIUM TC 99M SESTAMIBI 1 DOSE: 1 INJECTION INTRAVENOUS at 08:16

## 2020-10-01 RX ADMIN — SODIUM CHLORIDE, PRESERVATIVE FREE 10 ML: 5 INJECTION INTRAVENOUS at 09:09

## 2020-10-01 NOTE — PROGRESS NOTES
FAMILY MEDICINE DAILY PROGRESS NOTE    NAME: Paddy Brantley  : 1960  MRN: [id number]      LOS: 0 days     PROVIDER OF SERVICE: Kwaku Funk MD    Chief Complaint: Coronary artery disease of native heart with stable angina pectoris (CMS/[facility])    Subjective:     Interval History:  History taken from: patient chart RN.  The patient reports that he had one episode of chest pressure last night that ended about 1:00 this morning.  Since that time he has had no recurrence of his symptoms.  He is currently n.p.o. in preparation for his Lexiscan this morning.  I spoke with telemetry and they reported there were no overnight events.  There are currently no fluids running and he is on room air breathing appropriately.    Patient Complaints: Chest pressure that is now intermittent this is an improvement from the constant chest pressure he had on admission.  He also reports excessive sweating.    Patient Denies: Fever, chills, nausea, vomiting, diarrhea, or shortness of breath.  He reports that his appetite is good.    Review of Systems:   Review of Systems   Constitutional: Negative for appetite change, chills, diaphoresis, fatigue, fever and unexpected weight change.   HENT: Negative for congestion, ear discharge, hearing loss, mouth sores, rhinorrhea, sore throat and trouble swallowing.    Eyes: Negative for pain, discharge, redness and visual disturbance.   Respiratory: Negative for cough, chest tightness, shortness of breath and wheezing.    Cardiovascular: Negative for chest pain, palpitations and leg swelling.   Gastrointestinal: Negative for abdominal pain, blood in stool, constipation, diarrhea, nausea and vomiting.   Genitourinary: Negative for difficulty urinating, dysuria and hematuria.   Musculoskeletal: Negative for back pain and joint swelling.   Skin: Negative for color change.   Neurological: Negative for dizziness, weakness, numbness and headaches.   Hematological: Negative for adenopathy.      Psychiatric/Behavioral: Negative for behavioral problems, confusion, hallucinations and sleep disturbance.       Objective:     Vital Signs  Temp:  [97.3 °F (36.3 °C)-98.1 °F (36.7 °C)] 97.3 °F (36.3 °C)  Heart Rate:  [58-76] 65  Resp:  [17-20] 18  BP: (123-192)/(64-93) 143/67  Body mass index is 33.15 kg/m².    Physical Exam  Physical Exam  Constitutional:       Appearance: Normal appearance. He is not ill-appearing or diaphoretic.   HENT:      Head: Normocephalic and atraumatic.      Right Ear: External ear normal.      Left Ear: External ear normal.      Nose: No rhinorrhea.      Mouth/Throat:      Pharynx: No posterior oropharyngeal erythema.   Eyes:      General: No scleral icterus.        Right eye: No discharge.         Left eye: No discharge.      Extraocular Movements: Extraocular movements intact.   Neck:      Musculoskeletal: Neck supple. No neck rigidity or muscular tenderness.      Vascular: Carotid bruit present.      Comments: Bilateral carotid bruits were appreciated on auscultation.  Cardiovascular:      Rate and Rhythm: Normal rate and regular rhythm.      Pulses: Normal pulses.      Heart sounds: Normal heart sounds. No murmur.   Pulmonary:      Effort: Pulmonary effort is normal.      Breath sounds: Normal breath sounds. No wheezing or rhonchi.   Chest:      Chest wall: No tenderness.   Abdominal:      General: Bowel sounds are normal.      Palpations: Abdomen is soft. There is no mass.      Tenderness: There is no abdominal tenderness.      Hernia: No hernia is present.   Musculoskeletal:         General: No swelling or tenderness.      Right lower leg: No edema.      Left lower leg: No edema.   Skin:     General: Skin is warm and dry.      Capillary Refill: Capillary refill takes 2 to 3 seconds.      Findings: No erythema or rash.   Neurological:      General: No focal deficit present.      Mental Status: He is alert and oriented to person, place, and time.      Cranial Nerves: No cranial  nerve deficit.      Motor: No weakness.      Coordination: Coordination normal.   Psychiatric:         Mood and Affect: Mood normal.         Behavior: Behavior normal.         Thought Content: Thought content normal.         Judgment: Judgment normal.         Medication Review    Current Facility-Administered Medications:   •  dilTIAZem CD (CARDIZEM CD) 24 hr capsule 180 mg, 180 mg, Oral, Q24H, Ryan Ceja MD, 180 mg at 09/30/20 2315  •  diphenhydrAMINE (BENADRYL) injection 50 mg, 50 mg, Intravenous, Once, Ryan Ceja MD  •  famotidine (PEPCID) tablet 40 mg, 40 mg, Oral, Daily, Kwaku Funk MD  •  insulin aspart (novoLOG) injection 22 Units, 22 Units, Subcutaneous, BID Brenna LANDEROS Anthony, MD, 22 Units at 09/30/20 2322  •  insulin NPH (humuLIN N,novoLIN N) injection 42 Units, 42 Units, Subcutaneous, BID Brenna LANDEROS Anthony, MD, 42 Units at 09/30/20 2316  •  losartan (COZAAR) tablet 50 mg, 50 mg, Oral, Q24H, Terrance Arguello MD, 50 mg at 09/30/20 1529  •  nitroglycerin (NITROSTAT) SL tablet 0.4 mg, 0.4 mg, Sublingual, Q5 Min PRN, Kwaku Funk MD  •  predniSONE (DELTASONE) tablet 50 mg, 50 mg, Oral, Q6H, Ryan Ceja MD, 50 mg at 10/01/20 0307  •  sodium chloride 0.9 % flush 10 mL, 10 mL, Intravenous, PRN, Eddie Gaines MD  •  sodium chloride 0.9 % flush 10 mL, 10 mL, Intravenous, Q12H, Kwaku Funk MD, 10 mL at 10/01/20 0022  •  sodium chloride 0.9 % flush 10 mL, 10 mL, Intravenous, PRN, Kwaku Funk MD     Diagnostic Data    Lab Results (last 24 hours)     Procedure Component Value Units Date/Time    Basic Metabolic Panel [[id number]]  (Abnormal) Collected: 10/01/20 0535    Specimen: Blood Updated: 10/01/20 0627     Glucose 132 mg/dL      BUN 25 mg/dL      Creatinine 1.17 mg/dL      Sodium 139 mmol/L      Potassium 4.8 mmol/L      Chloride 103 mmol/L      CO2 25.0 mmol/L      Calcium 9.6 mg/dL      eGFR Non African Amer 63 mL/min/1.73      BUN/Creatinine Ratio 21.4     Anion Gap 11.0 mmol/L      Narrative:      GFR Normal >60  Chronic Kidney Disease <60  Kidney Failure <15      POC Glucose Once [[id number]]  (Normal) Collected: 10/01/20 0555    Specimen: Blood Updated: 10/01/20 0624     Glucose 125 mg/dL     POC Glucose Once [[id number]]  (Normal) Collected: 09/30/20 1944    Specimen: Blood Updated: 10/01/20 0622     Glucose 128 mg/dL     CBC & Differential [[id number]]  (Abnormal) Collected: 10/01/20 0535    Specimen: Blood Updated: 10/01/20 0615    Narrative:      The following orders were created for panel order CBC & Differential.  Procedure                               Abnormality         Status                     ---------                               -----------         ------                     CBC Auto Differential[[id number]]        Abnormal            Final result                 Please view results for these tests on the individual orders.    CBC Auto Differential [[id number]]  (Abnormal) Collected: 10/01/20 0535    Specimen: Blood Updated: 10/01/20 0615     WBC 7.33 10*3/mm3      RBC 5.60 10*6/mm3      Hemoglobin 16.5 g/dL      Hematocrit 49.9 %      MCV 89.1 fL      MCH 29.5 pg      MCHC 33.1 g/dL      RDW 12.8 %      RDW-SD 42.0 fl      MPV 9.8 fL      Platelets 179 10*3/mm3      Neutrophil % 83.6 %      Lymphocyte % 13.6 %      Monocyte % 1.4 %      Eosinophil % 0.3 %      Basophil % 0.4 %      Immature Grans % 0.7 %      Neutrophils, Absolute 6.13 10*3/mm3      Lymphocytes, Absolute 1.00 10*3/mm3      Monocytes, Absolute 0.10 10*3/mm3      Eosinophils, Absolute 0.02 10*3/mm3      Basophils, Absolute 0.03 10*3/mm3      Immature Grans, Absolute 0.05 10*3/mm3      nRBC 0.0 /100 WBC     Troponin [[id number]]  (Normal) Collected: 09/30/20 1856    Specimen: Blood Updated: 09/30/20 1919     Troponin T <0.010 ng/mL     Narrative:      Troponin T Reference Range:  <= 0.03 ng/mL-   Negative for AMI  >0.03 ng/mL-     Abnormal for myocardial necrosis.  Clinicians would have to utilize clinical acumen,  EKG, Troponin and serial changes to determine if it is an Acute Myocardial Infarction or myocardial injury due to an underlying chronic condition.       Results may be falsely decreased if patient taking Biotin.      Troponin [[id number]]  (Normal) Collected: 09/30/20 1557    Specimen: Blood Updated: 09/30/20 1640     Troponin T <0.010 ng/mL     Narrative:      Troponin T Reference Range:  <= 0.03 ng/mL-   Negative for AMI  >0.03 ng/mL-     Abnormal for myocardial necrosis.  Clinicians would have to utilize clinical acumen, EKG, Troponin and serial changes to determine if it is an Acute Myocardial Infarction or myocardial injury due to an underlying chronic condition.       Results may be falsely decreased if patient taking Biotin.      POC Glucose Once [[id number]]  (Normal) Collected: 09/30/20 1621    Specimen: Blood Updated: 09/30/20 1637     Glucose 79 mg/dL     Hemoglobin A1c [[id number]]  (Normal) Collected: 09/30/20 0750    Specimen: Blood Updated: 09/30/20 1359     Hemoglobin A1C 5.60 %     Narrative:      Hemoglobin A1C Ranges:    Increased Risk for Diabetes  5.7% to 6.4%  Diabetes                     >= 6.5%  Diabetic Goal                < 7.0%    Basic Metabolic Panel [[id number]]  (Abnormal) Collected: 09/30/20 1316    Specimen: Blood Updated: 09/30/20 1347     Glucose 96 mg/dL      BUN 22 mg/dL      Creatinine 1.09 mg/dL      Sodium 143 mmol/L      Potassium 4.2 mmol/L      Chloride 104 mmol/L      CO2 30.0 mmol/L      Calcium 9.2 mg/dL      eGFR Non African Amer 69 mL/min/1.73      BUN/Creatinine Ratio 20.2     Anion Gap 9.0 mmol/L     Narrative:      GFR Normal >60  Chronic Kidney Disease <60  Kidney Failure <15      Troponin [[id number]]  (Normal) Collected: 09/30/20 1316    Specimen: Blood Updated: 09/30/20 1347     Troponin T <0.010 ng/mL     Narrative:      Troponin T Reference Range:  <= 0.03 ng/mL-   Negative for AMI  >0.03 ng/mL-     Abnormal for myocardial necrosis.  Clinicians would have to  utilize clinical acumen, EKG, Troponin and serial changes to determine if it is an Acute Myocardial Infarction or myocardial injury due to an underlying chronic condition.       Results may be falsely decreased if patient taking Biotin.      Lipid Panel [[id number]]  (Abnormal) Collected: 09/30/20 1316    Specimen: Blood Updated: 09/30/20 1347     Total Cholesterol 211 mg/dL      Triglycerides 172 mg/dL      HDL Cholesterol 44 mg/dL      LDL Cholesterol  133 mg/dL      VLDL Cholesterol 34.4 mg/dL      LDL/HDL Ratio 3.01    Narrative:      Cholesterol Reference Ranges  (U.S. Department of Health and Human Services ATP III Classifications)    Desirable          <200 mg/dL  Borderline High    200-239 mg/dL  High Risk          >240 mg/dL      Triglyceride Reference Ranges  (U.S. Department of Health and Human Services ATP III Classifications)    Normal           <150 mg/dL  Borderline High  150-199 mg/dL  High             200-499 mg/dL  Very High        >500 mg/dL    HDL Reference Ranges  (U.S. Department of Health and Human Services ATP III Classifcations)    Low     <40 mg/dl (major risk factor for CHD)  High    >60 mg/dl ('negative' risk factor for CHD)        LDL Reference Ranges  (U.S. Department of Health and Human Services ATP III Classifcations)    Optimal          <100 mg/dL  Near Optimal     100-129 mg/dL  Borderline High  130-159 mg/dL  High             160-189 mg/dL  Very High        >189 mg/dL    CK Total & CKMB [[id number]]  (Normal) Collected: 09/30/20 0836    Specimen: Blood Updated: 09/30/20 1312     CKMB 2.19 ng/mL      Creatine Kinase 92 U/L     Narrative:      CKMB results may be falsely decreased if patient taking Biotin.    Lipid Panel [[id number]]  (Abnormal) Collected: 09/30/20 0836    Specimen: Blood Updated: 09/30/20 1312     Total Cholesterol 213 mg/dL      Triglycerides 130 mg/dL      HDL Cholesterol 43 mg/dL      LDL Cholesterol  144 mg/dL      VLDL Cholesterol 26 mg/dL      LDL/HDL Ratio 3.35      Narrative:      Cholesterol Reference Ranges  (U.S. Department of Health and Human Services ATP III Classifications)    Desirable          <200 mg/dL  Borderline High    200-239 mg/dL  High Risk          >240 mg/dL      Triglyceride Reference Ranges  (U.S. Department of Health and Human Services ATP III Classifications)    Normal           <150 mg/dL  Borderline High  150-199 mg/dL  High             200-499 mg/dL  Very High        >500 mg/dL    HDL Reference Ranges  (U.S. Department of Health and Human Services ATP III Classifcations)    Low     <40 mg/dl (major risk factor for CHD)  High    >60 mg/dl ('negative' risk factor for CHD)        LDL Reference Ranges  (U.S. Department of Health and Human Services ATP III Classifcations)    Optimal          <100 mg/dL  Near Optimal     100-129 mg/dL  Borderline High  130-159 mg/dL  High             160-189 mg/dL  Very High        >189 mg/dL    COVID-19, [facility] IN-HOUSE, NP SWAB IN TRANSPORT MEDIA 8-10 HR TAT - Swab, Nasopharynx [[id number]]  (Normal) Collected: 09/30/20 0938    Specimen: Swab from Nasopharynx Updated: 09/30/20 1259     COVID19 Not Detected    Narrative:      Testing performed by Real Time RT-PCR  This test has not been approved by the [facility] but is authorized under the Emergency Use Act (EUA)    Fact sheet for providers: https://www.fda.gov/media/227949/download    Fact sheet for patients: https://www.fda.gov/media/193257/download        Max Draw [[id number]] Collected: 09/30/20 0750    Specimen: Blood Updated: 09/30/20 0945    Narrative:      The following orders were created for panel order Max Draw.  Procedure                               Abnormality         Status                     ---------                               -----------         ------                     Light Blue Top[[id number]]                                   Final result               Green Top (Gel)[[id number]]                                  Final result                Lavender Top[[id number]]                                     Final result               Gold Top - SST[[id number]]                                   Final result                 Please view results for these tests on the individual orders.    Light Blue Top [[id number]] Collected: 09/30/20 0836    Specimen: Blood Updated: 09/30/20 0945     Extra Tube hold for add-on     Comment: Auto resulted       Green Top (Gel) [[id number]] Collected: 09/30/20 0836    Specimen: Blood Updated: 09/30/20 0945     Extra Tube Hold for add-ons.     Comment: Auto resulted.       Gold Top - SST [[id number]] Collected: 09/30/20 0836    Specimen: Blood Updated: 09/30/20 0945     Extra Tube Hold for add-ons.     Comment: Auto resulted.       Extra Tubes [[id number]] Collected: 09/30/20 0836    Specimen: Blood Updated: 09/30/20 0945    Narrative:      The following orders were created for panel order Extra Tubes.  Procedure                               Abnormality         Status                     ---------                               -----------         ------                     Lavender Top[[id number]]                                     Final result                 Please view results for these tests on the individual orders.    Lavender Top [[id number]] Collected: 09/30/20 0836    Specimen: Blood Updated: 09/30/20 0945     Extra Tube hold for add-on     Comment: Auto resulted       Troponin [[id number]]  (Normal) Collected: 09/30/20 0836    Specimen: Blood Updated: 09/30/20 0902     Troponin T <0.010 ng/mL     Narrative:      Troponin T Reference Range:  <= 0.03 ng/mL-   Negative for AMI  >0.03 ng/mL-     Abnormal for myocardial necrosis.  Clinicians would have to utilize clinical acumen, EKG, Troponin and serial changes to determine if it is an Acute Myocardial Infarction or myocardial injury due to an underlying chronic condition.       Results may be falsely decreased if patient taking Biotin.      Comprehensive Metabolic Panel  [[id number]]  (Abnormal) Collected: 09/30/20 0836    Specimen: Blood Updated: 09/30/20 0902     Glucose 115 mg/dL      BUN 24 mg/dL      Creatinine 1.08 mg/dL      Sodium 142 mmol/L      Potassium 4.7 mmol/L      Chloride 105 mmol/L      CO2 29.0 mmol/L      Calcium 9.5 mg/dL      Total Protein 6.5 g/dL      Albumin 4.30 g/dL      ALT (SGPT) 11 U/L      AST (SGOT) 12 U/L      Alkaline Phosphatase 48 U/L      Total Bilirubin 0.3 mg/dL      eGFR Non African Amer 69 mL/min/1.73      Globulin 2.2 gm/dL      A/G Ratio 2.0 g/dL      BUN/Creatinine Ratio 22.2     Anion Gap 8.0 mmol/L     Narrative:      GFR Normal >60  Chronic Kidney Disease <60  Kidney Failure <15      BNP [[id number]]  (Normal) Collected: 09/30/20 0836    Specimen: Blood Updated: 09/30/20 0900     proBNP 253.0 pg/mL     Narrative:      Among patients with dyspnea, NT-proBNP is highly sensitive for the detection of acute congestive heart failure. In addition NT-proBNP of <300 pg/ml effectively rules out acute congestive heart failure with 99% negative predictive value.    Results may be falsely decreased if patient taking Biotin.      Lavender Top [[id number]] Collected: 09/30/20 0750    Specimen: Blood Updated: 09/30/20 0900     Extra Tube hold for add-on     Comment: Auto resulted       CBC & Differential [[id number]]  (Normal) Collected: 09/30/20 0750    Specimen: Blood Updated: 09/30/20 0758    Narrative:      The following orders were created for panel order CBC & Differential.  Procedure                               Abnormality         Status                     ---------                               -----------         ------                     CBC Auto Differential[[id number]]        Normal              Final result                 Please view results for these tests on the individual orders.    CBC Auto Differential [[id number]]  (Normal) Collected: 09/30/20 0750    Specimen: Blood Updated: 09/30/20 0758     WBC 8.59 10*3/mm3      RBC 5.13 10*6/mm3       Hemoglobin 15.5 g/dL      Hematocrit 45.9 %      MCV 89.5 fL      MCH 30.2 pg      MCHC 33.8 g/dL      RDW 12.9 %      RDW-SD 42.3 fl      MPV 9.6 fL      Platelets 173 10*3/mm3      Neutrophil % 67.3 %      Lymphocyte % 21.5 %      Monocyte % 8.7 %      Eosinophil % 1.4 %      Basophil % 0.6 %      Immature Grans % 0.5 %      Neutrophils, Absolute 5.78 10*3/mm3      Lymphocytes, Absolute 1.85 10*3/mm3      Monocytes, Absolute 0.75 10*3/mm3      Eosinophils, Absolute 0.12 10*3/mm3      Basophils, Absolute 0.05 10*3/mm3      Immature Grans, Absolute 0.04 10*3/mm3      nRBC 0.0 /100 WBC             I reviewed the patient's new clinical results.    Assessment/Plan:     Active Hospital Problems    Diagnosis POA   • **Coronary artery disease of native heart with stable angina pectoris (CMS/[facility]) [I25.118] Yes     Priority: High     Patient is post CABG x4 with a 3-week history of intermittent chest pressure and acute onset of constant chest pressure, nonexertional, not relieved by aspirin.  Significant history of CAD, PAD, DM, HTN, and hyperlipidemia.  - troponens negative x3  - CXR negative  - ECG within normal limits    Plan  - Consult Dr. Weems with cardiology as this is a former patient of his  - Losartan 50 every 24  - Morphine 2 mg IV as needed for pain  - Nitroglycerin 0.4 g sublingual as needed for pain-pressure  - Fluid restriction 1500 mL's per day  - Sodium restriction 1500 mg/day  - Patient will have a Lexiscan stress test this morning.  His disposition will depend on the results.     • PAD (peripheral artery disease) (CMS/[facility]) [I73.9] Yes     Priority: Medium     Patient reports intermittent claudication in the left leg over the course of the last year.  - Being followed by his PCP     • Mixed hyperlipidemia [E78.2] Yes     Priority: Medium     Elevated triglycerides, LDL, total cholesterol.  - Patient allergic to statin medications.  - Consider alternative to statin to prevent progression of disease.      • Diabetes mellitus (CMS/[facility]) [E11.9] Yes     Priority: Medium     Last A1c 5.60%, diet controlled  - cardiac diet  - Monitor glucose levels consider medical intervention with medications if needed     • S/P CABG (coronary artery bypass graft) [Z95.1] Not Applicable     Priority: Medium     Patient recalls CABG x4 was done almost 4 years ago.  Last visit to cardiologist about 1 year ago.  - Consult Dr. Weems, cardiology, for follow-up     • Essential hypertension [I10] Yes     Priority: Medium     Hypertension well-controlled on home medications.  - Continue losartan 50 mg every 24  - Monitor sodium intake and limit it to 1500 mg/day  - Monitor for signs of new onset heart failure  - Daily standing weights  - Strict I's and O's     • Tobacco abuse [Z72.0] Yes     Priority: Low     Patient reports that he used to smoke 4 packs a day for many years.  Currently smokes quarter of a pack a day.  Total smoking years 42.  - Offered help with nicotine addiction the patient stated he would prefer to try on his own as he has been successful in tapering down.     • GERD (gastroesophageal reflux disease) [K21.9] Yes     Zantac 300 mg twice daily at home.  - PPI inpatient         DVT prophylaxis: SCDs/TEDs  Code status is   Code Status and Medical Interventions:   Ordered at: 09/30/20 1250     Level Of Support Discussed With:    Patient     Code Status:    CPR     Medical Interventions (Level of Support Prior to Arrest):    Full     Comments:    He expressed a desire to only be hooked up to machine for 3 days at the maximum.       Plan for disposition:Where: home and When:  The time of his discharge will depend on the results of today's Lexiscan.      Time: More than 50% of time spent in counseling and coordination of care:  Total face-to-face/floor time 20 min.  Time spent in counseling 10 min. Counseling included the following topics: Proper diet and exercise and control of his lipid levels.         This document has been  electronically signed by Kwaku Funk MD on October 1, 2020 07:33 CDT

## 2020-10-01 NOTE — CONSULTS
[facility] Cardiology Consult    Referring Provider: Eddie Gaines MD    Reason for Consultation: CP, bette of CAD  Patient Care Team:  Tameka Billingsley APRN as PCP - General (Nurse Practitioner)    Chief complaint   Chief Complaint   Patient presents with   • Chest Pain   • Shortness of Breath       Subjective .     History of present illness:     Mr. Paddy Brantley is a 60-year-old  male with multiple medical issues including CAD status post CABG, multiple PCI prior to CABG, GERD, COPD, diabetes mellitus, chronic pain syndrome, nicotine dependence, BPH, multiple drug allergies.  He has extensive cardiac history and underwent PCI to LAD, diagonal in 2005.  PCI to left circumflex in 2007.  Cardiac catheterization 11-20 16 showed significant progression of CAD with multivessel disease and patient underwent CABG by Dr. Sutton: LIMA to LAD, SVG to OM, SVG to diagonal, SVG to PDA.  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  The patient has multiple drug allergies and has been unable to take multiple medications including statins, Plavix, beta-blockers to name a few.    Yesterday he presented to ER complaining of chest tightness and pressure which has been intermittent for the past 3 weeks.  Patient reports he woke up yesterday morning around 4 AM with substernal chest pressure and tightness without any significant aggravating or relieving factors.  EKG in ER showed nonspecific ST and T wave changes.  Troponin x4 negative, proBNP negative.  He was given Nitropaste and morphine along with breathing treatment.  On reevaluation in ER he felt much better.  Chest x-ray negative for acute pulmonary findings.  He was admitted for observation to the resident service.    MPS/Lexiscan: 06/2019    · Findings consistent with an equivocal ECG stress test.  · Left ventricular ejection fraction is normal (Calculated EF = 67%).  · Myocardial perfusion  imaging indicates a normal myocardial perfusion study with no evidence of ischemia.  · Impressions are consistent with a low risk study.    Echo 06/2019:     Left Ventricle Left ventricular systolic function is normal. Estimated EF appears to be in the range of 51 - 55%. Estimated EF = 53%. All left ventricular wall segments contract normally. The left ventricular cavity is borderline dilated.   Right Ventricle Right ventricle not well visualized. Normal right ventricular cavity size and systolic function noted.   Left Atrium Normal left atrial size and volume noted.   Right Atrium Normal right atrial size noted.   Aortic Valve The aortic valve is not well visualized. The valve appears trileaflet. The aortic valve is abnormal in structure. The valve exhibits sclerosis. No aortic valve regurgitation is present. No hemodynamically significant aortic valve stenosis is present.   Mitral Valve The mitral valve is abnormal in structure. Mild MAC is present. Trace mitral valve regurgitation is present.   Tricuspid Valve Tricuspid valve not well visualized. The tricuspid valve is grossly normal.   Pulmonic Valve The pulmonic valve is not well visualized.   Greater Vessels No dilation of the aortic root is present.   Pericardium The pericardium is normal. There is no evidence of pericardial effusion.           The 10-year CVD risk score (D'Agostino, et al., 2008) is: 88.5%    Values used to calculate the score:      Age: 60 years      Sex: Male      Diabetic: Yes      Tobacco smoker: Yes      Systolic Blood Pressure: 196 mmHg      Is BP treated: Yes      HDL Cholesterol: 44 mg/dL      Total Cholesterol: 211 mg/dL      Review of Systems  Review of Systems   Constitutional: Negative for activity change, chills, fatigue, fever and unexpected weight change.   HENT: Negative for hearing loss, nosebleeds, tinnitus, trouble swallowing and voice change.    Eyes: Negative for visual disturbance.   Respiratory: Negative for apnea,  cough, chest tightness, shortness of breath and wheezing.    Cardiovascular: Positive for chest pain (denies current CP). Negative for palpitations and leg swelling.   Gastrointestinal: Negative for abdominal distention, abdominal pain and blood in stool.   Endocrine: Negative for cold intolerance, heat intolerance, polydipsia, polyphagia and polyuria.   Genitourinary: Negative.    Musculoskeletal: Negative for arthralgias and back pain.   Skin: Negative.    Allergic/Immunologic: Negative.    Neurological: Negative for seizures, syncope, speech difficulty, numbness and headaches.   Hematological: Negative for adenopathy. Does not bruise/bleed easily.   Psychiatric/Behavioral: Negative for agitation, behavioral problems and suicidal ideas. The patient is not nervous/anxious.        History  Past Medical History:   Diagnosis Date   • Accident caused by hypodermic needle    • Guerra esophagus    • Contact with and (suspected) exposure to potentially hazardous body fluids    • Diabetes mellitus (CMS/HCC)    • GERD (gastroesophageal reflux disease)    • Kidney stones    • Meniere's disease    • MRSA infection     right ing hernia   • PAD (peripheral artery disease) (CMS/HCC)    ,   Past Surgical History:   Procedure Laterality Date   • APPENDECTOMY     • CARDIAC CATHETERIZATION     • CARDIAC SURGERY  11/22/2016    CABG   • CHEST TUBE INSERTION     • CORONARY ARTERY BYPASS GRAFT     • GALLBLADDER SURGERY     • INCISION AND DRAINAGE ABSCESS     • INGUINAL HERNIA REPAIR Bilateral    • LUMBAR LAMINECTOMY     • STERNAL REWIRING N/A 12/19/2017    Procedure: REPAIR STERNAL DEHISCENCE WITH STERNAL DEBRIDEMENT  Need plate and screws;  Surgeon: Homero Sutton MD;  Location: [facility];  Service:    • UMBILICAL HERNIA REPAIR     , History reviewed. No pertinent family history.,   Social History     Tobacco Use   • Smoking status: Current Every Day Smoker     Packs/day: 0.50     Years: 40.00     Pack years: 20.00     Types:  Cigarettes     Last attempt to quit: 1/3/2017     Years since quitting: 3.7   • Smokeless tobacco: Never Used   Substance Use Topics   • Alcohol use: No   • Drug use: No   ,   Medications Prior to Admission   Medication Sig Dispense Refill Last Dose   • albuterol sulfate  (90 Base) MCG/ACT inhaler Inhale 2 puffs Every 4 (Four) Hours As Needed for Wheezing.      • aspirin 325 MG tablet Take 325 mg by mouth Every 12 (Twelve) Hours.   9/30/2020 at Unknown time   • Biotin 5000 MCG tablet Take 5,000 mcg by mouth Daily. 2 tablets   9/30/2020 at Unknown time   • CARTIA  MG 24 hr capsule TAKE 1 CAPSULE BY MOUTH EVERY NIGHT 90 capsule 0 9/29/2020 at Unknown time   • fluticasone-salmeterol (Wixela Inhub) 250-50 MCG/DOSE DISKUS Inhale 2 (Two) Times a Day.      • insulin NPH (HUMULIN N) 100 UNIT/ML injection Inject 42 Units under the skin 2 (Two) Times a Day.   9/30/2020 at Unknown time   • losartan (COZAAR) 50 MG tablet Take 1 tablet by mouth Daily. 30 tablet 6 9/29/2020 at Unknown time   • NOVOLIN R 100 UNIT/ML injection Inject 22 Units as directed 2 (Two) Times a Day.   9/30/2020 at Unknown time   • oxyCODONE-acetaminophen (PERCOCET)  MG per tablet Take 1 tablet by mouth Every 6 (Six) Hours As Needed for Moderate Pain . 15 tablet 0 9/29/2020 at Unknown time   • raNITIdine (ZANTAC) 300 MG tablet Take 300 mg by mouth 2 (Two) Times a Day.   9/30/2020 at Unknown time   • tadalafil (ADCIRCA) 20 MG tablet tablet Take 5 mg by mouth Daily. 1 tablet daily for BPH      • tiotropium (SPIRIVA) 18 MCG per inhalation capsule Place 1 capsule into inhaler and inhale Daily.      • acetaminophen (TYLENOL) 325 MG tablet Take 2 tablets by mouth Every 6 (Six) Hours As Needed for Mild Pain .   Unknown at Unknown time   • diphenhydrAMINE (BENADRYL) 25 mg capsule Take 25 mg by mouth Every 6 (Six) Hours As Needed for Itching.   Unknown at Unknown time      ALLERGIES  Amiodarone, Contrast dye, Iodine, Multivitamins, Pepcid  "[famotidine], Proton pump inhibitors, Shellfish-derived products, Dexamethasone, Livalo [pitavastatin], Plavix [clopidogrel bisulfate], Statins, Tetracyclines & related, and Penicillins    Objective     Vital Sign Min/Max for last 24 hours  Temp  Min: 97.3 °F (36.3 °C)  Max: 98.1 °F (36.7 °C)   BP  Min: 131/64  Max: 196/87   Pulse  Min: 58  Max: 80   Resp  Min: 18  Max: 18   SpO2  Min: 93 %  Max: 97 %   No data recorded   Weight  Min: 94.6 kg (208 lb 9.6 oz)  Max: 98.9 kg (218 lb)     Flowsheet Rows      First Filed Value   Admission Height  172.7 cm (68\") Documented at 09/30/2020 0732   Admission Weight  94.3 kg (208 lb) Documented at 09/30/2020 0732             Physical Exam:  Physical Exam  Vitals signs and nursing note reviewed.   Constitutional:       General: He is not in acute distress.     Appearance: Normal appearance. He is well-developed. He is not diaphoretic.   HENT:      Head: Normocephalic.      Right Ear: External ear normal.      Left Ear: External ear normal.   Eyes:      General: Lids are normal.      Pupils: Pupils are equal, round, and reactive to light.   Neck:      Thyroid: No thyromegaly.      Vascular: No carotid bruit or JVD.      Trachea: No tracheal deviation.   Cardiovascular:      Rate and Rhythm: Normal rate and regular rhythm.      Heart sounds: Normal heart sounds. No murmur. No friction rub. No gallop.    Pulmonary:      Effort: Pulmonary effort is normal. No respiratory distress.      Breath sounds: Normal breath sounds. No stridor. No wheezing or rales.   Chest:      Chest wall: No tenderness.   Abdominal:      General: Bowel sounds are normal. There is no distension.      Palpations: Abdomen is soft.      Tenderness: There is no abdominal tenderness.   Musculoskeletal: Normal range of motion.         General: No swelling or deformity.      Right lower leg: No edema.      Left lower leg: No edema.   Skin:     General: Skin is warm and dry.      Capillary Refill: Capillary refill " takes 2 to 3 seconds.      Findings: No erythema or rash.   Neurological:      Mental Status: He is alert and oriented to person, place, and time.      Cranial Nerves: No cranial nerve deficit.      Deep Tendon Reflexes: Reflexes are normal and symmetric. Reflexes normal.   Psychiatric:         Behavior: Behavior normal.         Thought Content: Thought content normal.         Judgment: Judgment normal.            Results Review:     Results from last 7 days   Lab Units 10/01/20  0535 09/30/20  1316 09/30/20  0836   SODIUM mmol/L 139 143 142   POTASSIUM mmol/L 4.8 4.2 4.7   CHLORIDE mmol/L 103 104 105   CO2 mmol/L 25.0 30.0* 29.0   BUN mg/dL 25* 22 24*   CREATININE mg/dL 1.17 1.09 1.08   CALCIUM mg/dL 9.6 9.2 9.5   BILIRUBIN mg/dL  --   --  0.3   ALK PHOS U/L  --   --  48   ALT (SGPT) U/L  --   --  11   AST (SGOT) U/L  --   --  12   GLUCOSE mg/dL 132* 96 115*       Estimated Creatinine Clearance: 74.6 mL/min (by C-G formula based on SCr of 1.17 mg/dL).          Results from last 7 days   Lab Units 10/01/20  0535 09/30/20  0750   WBC 10*3/mm3 7.33 8.59   HEMOGLOBIN g/dL 16.5 15.5   PLATELETS 10*3/mm3 179 173             Lab Results   Component Value Date    CKTOTAL 92 09/30/2020    CKMB 2.19 09/30/2020    TROPONINI <0.012 11/18/2016    TROPONINT <0.010 09/30/2020     Lab Results   Component Value Date    PROBNP 253.0 09/30/2020       No intake/output data recorded.    Cardiographics  ECG/EMG Results (last 24 hours)     Procedure Component Value Units Date/Time    ECG 12 Lead [[id number]] Collected: 09/30/20 0745     Updated: 09/30/20 1150    Narrative:      Test Reason : CHEST PAIN  Blood Pressure : **/** mmHG  Vent. Rate : 069 BPM     Atrial Rate : 069 BPM     P-R Int : 146 ms          QRS Dur : 086 ms      QT Int : 368 ms       P-R-T Axes : 069 061 092 degrees     QTc Int : 394 ms    Normal sinus rhythm  Nonspecific T wave abnormality  Abnormal ECG  When compared with ECG of 07-JUN-2019 16:08,  No significant change  was found  Confirmed by BLACK ADASM MD ([id number]) on 9/30/2020 11:50:06 AM    Referred By:  ERDR           Confirmed By:BLACK ADAMS MD    SCANNED EKG [[id number]] Resulted: 09/30/20      Updated: 09/30/20 1937    ECG 12 Lead [[id number]] Collected: 10/01/20 0009     Updated: 10/01/20 0008    Narrative:      Test Reason : chest pain  Blood Pressure : **/** mmHG  Vent. Rate : 059 BPM     Atrial Rate : 059 BPM     P-R Int : 152 ms          QRS Dur : 086 ms      QT Int : 394 ms       P-R-T Axes : 052 036 069 degrees     QTc Int : 390 ms    Sinus bradycardia  Otherwise normal ECG  When compared with ECG of 30-SEP-2020 07:45,  No significant change was found    Referred By:             Confirmed By:     ECG 12 Lead [[id number]] Collected: 10/01/20 0609     Updated: 10/01/20 0608    Narrative:      Test Reason : CP  Blood Pressure : **/** mmHG  Vent. Rate : 064 BPM     Atrial Rate : 064 BPM     P-R Int : 152 ms          QRS Dur : 082 ms      QT Int : 388 ms       P-R-T Axes : 047 031 070 degrees     QTc Int : 400 ms    Normal sinus rhythm  Normal ECG  When compared with ECG of 01-OCT-2020 00:09,  No significant change was found    Referred By:             Confirmed By:         Results for orders placed during the hospital encounter of 06/07/19   Transthoracic Echo Complete With Contrast if Necessary Per Protocol    Narrative · The study is technically difficult for diagnosis.  · The left ventricular cavity is borderline dilated.  · Estimated EF = 53%.  · Left ventricular systolic function is normal.            Xr Chest 2 View    Result Date: 9/30/2020  No acute cardiopulmonary abnormality. Electronically signed by:  Parth Rangel MD  9/30/2020 8:28 AM CDT Workstation: [id number]      Intake/Output     None            Assessment/Plan       Coronary artery disease of native heart with stable angina pectoris (CMS/HCC)    S/P CABG (coronary artery bypass graft)    Essential hypertension    Tobacco abuse    PAD (peripheral  artery disease) (CMS/HCC)    Diabetes mellitus (CMS/HCC)    Mixed hyperlipidemia    GERD (gastroesophageal reflux disease)    Mr. Paddy Brantley is a 60-year-old  male with extensive cardiac history and underwent PCI to LAD, diagonal in 2005.  PCI to left circumflex in 2007.  Cardiac catheterization 11-20 16 showed significant progression of CAD with multivessel disease and patient underwent CABG by Dr. Sutton: LIMA to LAD, SVG to OM, SVG to diagonal, SVG to PDA.  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 Presented with intermittent CP x3 weeks as described above, no exertional component; consistent with atypical pain. Patient currently denies CP. The patient has multiple drug allergies and has been unable to take multiple medications including statins, Plavix, beta-blockers to name a few.  Patient lost to follow-up since 2017.  He was seen in the hospital in 2019 for complaints of chest pain and underwent myocardial perfusion study with Lexiscan at that time showed no evidence of ischemia consistent with a low risk study.    -Troponin x4 negative.   -CXR no acute cardiopulmonary findings.  -EKG negative for acute ST-T wave changes.  -ABRAHAM  -Patient underwent 1 day Lexiscan myocardial perfusion study this morning.  Pending results per Dr. Resendez    HLD: Patient has allergy to statins.  Alternative to statin should be considered.  Patient educated on needed dietary and lifestyle changes.    HTN: Well-controlled on home medications of losartan 50 mg, Cardizem  mg    Tobacco use:    Paddy Brantley  reports that he has been smoking cigarettes. He has a 20.00 pack-year smoking history. He has never used smokeless tobacco.. I have educated him on the risk of diseases from using tobacco products such as cancer, COPD and heart diease.     I advised him to quit and he is not willing to quit.    I spent 3  minutes counseling  the patient.           I discussed the patients findings and my recommendations with patient and Dr. Resendez.                This document has been electronically signed by GISSELLE Khan on October 1, 2020 09:51 CDT

## 2020-10-01 NOTE — DISCHARGE SUMMARY
DISCHARGE SUMMARY    PATIENT NAME: Paddy Brantley       PHYSICIAN: Donn Lee MD  : 1960  MRN: [id number]    ADMITTED: 2020     DISCHARGED: ***    ADMISSION DIAGNOSES:  Active Hospital Problems    Diagnosis  POA   • **Coronary artery disease of native heart with stable angina pectoris (CMS/HCC) [I25.118]  Yes   • GERD (gastroesophageal reflux disease) [K21.9]  Yes   • PAD (peripheral artery disease) (CMS/HCC) [I73.9]  Yes   • Mixed hyperlipidemia [E78.2]  Yes   • Diabetes mellitus (CMS/HCC) [E11.9]  Yes   • Tobacco abuse [Z72.0]  Yes   • S/P CABG (coronary artery bypass graft) [Z95.1]  Not Applicable   • Essential hypertension [I10]  Yes      Resolved Hospital Problems   No resolved problems to display.     DISCHARGE DIAGNOSES:   Active Hospital Problems    Diagnosis  POA   • **Coronary artery disease of native heart with stable angina pectoris (CMS/HCC) [I25.118]  Yes   • GERD (gastroesophageal reflux disease) [K21.9]  Yes   • PAD (peripheral artery disease) (CMS/HCC) [I73.9]  Yes   • Mixed hyperlipidemia [E78.2]  Yes   • Diabetes mellitus (CMS/HCC) [E11.9]  Yes   • Tobacco abuse [Z72.0]  Yes   • S/P CABG (coronary artery bypass graft) [Z95.1]  Not Applicable   • Essential hypertension [I10]  Yes      Resolved Hospital Problems   No resolved problems to display.       SERVICE: Family Medicine Residency  Attending: {FMR Attendings:[id number]}  Resident: Donn Lee MD    CONSULTS:   Consult Orders (all) (From admission, onward)     Start     Ordered    20 1504  Inpatient Cardiology Consult  Once     Specialty:  Cardiology  Provider:  Gordo Resendez MD    20 1503    20 1232  Inpatient Cardiology Consult  Once,   Status:  Canceled     Specialty:  Cardiology  Provider:  Tadeo Kraus MD PhD    20 1250                PROCEDURES:   ***    HISTORY OF PRESENT ILLNESS:   Patient is a 60 y.o. male presented with ***.    DIAGNOSTIC DATA:   ***        HOSPITAL COURSE:  ***    DISCHARGE CONDITION:   ***    DISPOSITION:  Home or Self Care    DISCHARGE MEDICATIONS     Discharge Medications      Continue These Medications      Instructions Start Date   acetaminophen 325 MG tablet  Commonly known as: TYLENOL   650 mg, Oral, Every 6 Hours PRN      albuterol sulfate  (90 Base) MCG/ACT inhaler  Commonly known as: PROVENTIL HFA;VENTOLIN HFA;PROAIR HFA   2 puffs, Inhalation, Every 4 Hours PRN      aspirin 325 MG tablet   325 mg, Oral, Every 12 Hours Scheduled      Biotin 5000 MCG tablet   5,000 mcg, Oral, Daily, 2 tablets      Cartia  MG 24 hr capsule  Generic drug: dilTIAZem CD   180 mg, Oral, Nightly      diphenhydrAMINE 25 mg capsule  Commonly known as: BENADRYL   25 mg, Oral, Every 6 Hours PRN      HumuLIN N 100 UNIT/ML injection  Generic drug: insulin NPH   42 Units, Subcutaneous, 2 Times Daily      losartan 50 MG tablet  Commonly known as: COZAAR   50 mg, Oral, Every 24 Hours Scheduled      NovoLIN R 100 UNIT/ML injection  Generic drug: insulin regular   22 Units, Injection, 2 Times Daily      oxyCODONE-acetaminophen  MG per tablet  Commonly known as: PERCOCET   1 tablet, Oral, Every 6 Hours PRN      raNITIdine 300 MG tablet  Commonly known as: ZANTAC   300 mg, Oral, 2 Times Daily      tadalafil 20 MG tablet tablet  Commonly known as: ADCIRCA   5 mg, Oral, Every 24 Hours Scheduled, 1 tablet daily for BPH       tiotropium 18 MCG per inhalation capsule  Commonly known as: SPIRIVA   1 capsule, Inhalation, Daily - RT      Wixela Inhub 250-50 MCG/DOSE DISKUS  Generic drug: fluticasone-salmeterol   Inhalation, 2 Times Daily - RT             INSTRUCTIONS:  Activity:   Activity Instructions     Activity as Tolerated          Diet:   Diet Instructions     Diet: Cardiac      Discharge Diet: Cardiac          FOLLOW UP:   Additional Instructions for the Follow-ups that You Need to Schedule     Discharge Follow-up with PCP   As directed       Currently  Documented PCP:    Tameka Billingsley APRN    PCP Phone Number:    [phone]     Follow Up Details: Follow up with PCP in 1 week         Discharge Follow-up with Specified Provider: Dr. Weems (cardiology); 1 Week   As directed      To: Dr. Weems (cardiology)    Follow Up: 1 Week           Follow-up Information     Tameka Billingsley APRN .    Specialty: Nurse Practitioner  Why: Follow up with PCP in 1 week  Contact information:  [address] [place] [id number]  [phone]                   PENDING TEST RESULTS AT DISCHARGE      Time: >30 minutes was spent in discharge planning, medication reconciliation and coordination of care for this patient.    {FMR Attendings:[id number]} is the attending at time of discharge, {Blank multiple:[id number]} is aware of the patient's status and agrees with the above discharge summary.    ***

## 2020-10-02 ENCOUNTER — DOCUMENTATION (OUTPATIENT)
Dept: FAMILY MEDICINE CLINIC | Facility: CLINIC | Age: 60
End: 2020-10-02

## 2020-10-02 NOTE — DISCHARGE SUMMARY
DISCHARGE SUMMARY    PATIENT NAME: Paddy Brantley       PHYSICIAN: Kwaku Funk MD  : 1960  MRN: [id number]    ADMITTED: 2020     DISCHARGED: 10/1/2020    ADMISSION DIAGNOSES:  Active Hospital Problems    Diagnosis  POA   • **Coronary artery disease of native heart with stable angina pectoris (CMS/HCC) [I25.118]  Yes     Priority: High   • PAD (peripheral artery disease) (CMS/HCC) [I73.9]  Yes     Priority: Medium   • Mixed hyperlipidemia [E78.2]  Yes     Priority: Medium   • Diabetes mellitus (CMS/HCC) [E11.9]  Yes     Priority: Medium   • S/P CABG (coronary artery bypass graft) [Z95.1]  Not Applicable     Priority: Medium   • Essential hypertension [I10]  Yes     Priority: Medium   • Tobacco abuse [Z72.0]  Yes     Priority: Low   • GERD (gastroesophageal reflux disease) [K21.9]  Yes      Resolved Hospital Problems   No resolved problems to display.     DISCHARGE DIAGNOSES:   Active Hospital Problems    Diagnosis  POA   • **Coronary artery disease of native heart with stable angina pectoris (CMS/HCC) [I25.118]  Yes     Priority: High   • PAD (peripheral artery disease) (CMS/HCC) [I73.9]  Yes     Priority: Medium   • Mixed hyperlipidemia [E78.2]  Yes     Priority: Medium   • Diabetes mellitus (CMS/HCC) [E11.9]  Yes     Priority: Medium   • S/P CABG (coronary artery bypass graft) [Z95.1]  Not Applicable     Priority: Medium   • Essential hypertension [I10]  Yes     Priority: Medium   • Tobacco abuse [Z72.0]  Yes     Priority: Low   • GERD (gastroesophageal reflux disease) [K21.9]  Yes      Resolved Hospital Problems   No resolved problems to display.       SERVICE: Family Medicine Res[facility]  Attending: Virgen Clark MD  Resident: Kwaku Funk MD    CONSULTS:   Consult Orders (all) (From admission, onward)     Start     Ordered    20 1504  Inpatient Cardiology Consult  Once     Specialty:  Cardiology  Provider:  Gordo Resendez MD    20 1503    20 1232  Inpatient  Cardiology Consult  Once,   Status:  Canceled     Specialty:  Cardiology  Provider:  Tadeo Kraus MD PhD    09/30/20 1250                PROCEDURES:   10/1/2020 Lexiscan stress test: Results negative    HISTORY OF PRESENT ILLNESS:     Copied from history taken by Dr. Gaines in the ED on 9/30/2020 and history taken by Dr. Funk on 9/30/2020    Patient is a 60 y.o. male presented with a [facility] of  of coronary artery disease status post CABG  x4, hypertension, hyperlipidemia, diabetes mellitus, chronic bronchitis, and tobacco abuse presented in the ER with chief complaint of chest tightness and pressure intermittent for the last 3 weeks.  Patient reports he woke up this morning around 4 AM with sudden onset of constant substernal chest pressure and tightness without any significant aggravating or relieving factors, no significant shortness of breath, no excessive sweating, and no radiation of his symptoms.  At 4:00 this morning he rated the pressure-like sensation as 6/10.  Currently it is 1/10.  Prior to his arrival he reportedly took some aspirin with no positive effect.  Patient recalled that his last visit to a cardiologist was 3 years ago however, upon consultation with his cardiologist Dr. Weems over the phone it was actually last year that he saw him.    DIAGNOSTIC DATA:   Lab Results (last 72 hours)     Procedure Component Value Units Date/Time    POC Glucose Once [[id number]]  (Abnormal) Collected: 10/01/20 1619    Specimen: Blood Updated: 10/01/20 1632     Glucose 223 mg/dL     POC Glucose Once [[id number]]  (Abnormal) Collected: 10/01/20 1019    Specimen: Blood Updated: 10/01/20 1033     Glucose 240 mg/dL     Basic Metabolic Panel [[id number]]  (Abnormal) Collected: 10/01/20 0535    Specimen: Blood Updated: 10/01/20 0627     Glucose 132 mg/dL      BUN 25 mg/dL      Creatinine 1.17 mg/dL      Sodium 139 mmol/L      Potassium 4.8 mmol/L      Chloride 103 mmol/L      CO2 25.0 mmol/L      Calcium 9.6 mg/dL       eGFR Non African Amer 63 mL/min/1.73      BUN/Creatinine Ratio 21.4     Anion Gap 11.0 mmol/L     Narrative:      GFR Normal >60  Chronic Kidney Disease <60  Kidney Failure <15      POC Glucose Once [[id number]]  (Normal) Collected: 10/01/20 0555    Specimen: Blood Updated: 10/01/20 0624     Glucose 125 mg/dL     POC Glucose Once [[id number]]  (Normal) Collected: 09/30/20 1944    Specimen: Blood Updated: 10/01/20 0622     Glucose 128 mg/dL     CBC & Differential [[id number]]  (Abnormal) Collected: 10/01/20 0535    Specimen: Blood Updated: 10/01/20 0615    Narrative:      The following orders were created for panel order CBC & Differential.  Procedure                               Abnormality         Status                     ---------                               -----------         ------                     CBC Auto Differential[[id number]]        Abnormal            Final result                 Please view results for these tests on the individual orders.    CBC Auto Differential [[id number]]  (Abnormal) Collected: 10/01/20 0535    Specimen: Blood Updated: 10/01/20 0615     WBC 7.33 10*3/mm3      RBC 5.60 10*6/mm3      Hemoglobin 16.5 g/dL      Hematocrit 49.9 %      MCV 89.1 fL      MCH 29.5 pg      MCHC 33.1 g/dL      RDW 12.8 %      RDW-SD 42.0 fl      MPV 9.8 fL      Platelets 179 10*3/mm3      Neutrophil % 83.6 %      Lymphocyte % 13.6 %      Monocyte % 1.4 %      Eosinophil % 0.3 %      Basophil % 0.4 %      Immature Grans % 0.7 %      Neutrophils, Absolute 6.13 10*3/mm3      Lymphocytes, Absolute 1.00 10*3/mm3      Monocytes, Absolute 0.10 10*3/mm3      Eosinophils, Absolute 0.02 10*3/mm3      Basophils, Absolute 0.03 10*3/mm3      Immature Grans, Absolute 0.05 10*3/mm3      nRBC 0.0 /100 WBC     Troponin [[id number]]  (Normal) Collected: 09/30/20 1856    Specimen: Blood Updated: 09/30/20 1919     Troponin T <0.010 ng/mL     Narrative:      Troponin T Reference Range:  <= 0.03 ng/mL-   Negative for  AMI  >0.03 ng/mL-     Abnormal for myocardial necrosis.  Clinicians would have to utilize clinical acumen, EKG, Troponin and serial changes to determine if it is an Acute Myocardial Infarction or myocardial injury due to an underlying chronic condition.       Results may be falsely decreased if patient taking Biotin.      Troponin [[id number]]  (Normal) Collected: 09/30/20 1557    Specimen: Blood Updated: 09/30/20 1640     Troponin T <0.010 ng/mL     Narrative:      Troponin T Reference Range:  <= 0.03 ng/mL-   Negative for AMI  >0.03 ng/mL-     Abnormal for myocardial necrosis.  Clinicians would have to utilize clinical acumen, EKG, Troponin and serial changes to determine if it is an Acute Myocardial Infarction or myocardial injury due to an underlying chronic condition.       Results may be falsely decreased if patient taking Biotin.      POC Glucose Once [[id number]]  (Normal) Collected: 09/30/20 1621    Specimen: Blood Updated: 09/30/20 1637     Glucose 79 mg/dL     Hemoglobin A1c [[id number]]  (Normal) Collected: 09/30/20 0750    Specimen: Blood Updated: 09/30/20 1359     Hemoglobin A1C 5.60 %     Narrative:      Hemoglobin A1C Ranges:    Increased Risk for Diabetes  5.7% to 6.4%  Diabetes                     >= 6.5%  Diabetic Goal                < 7.0%    Basic Metabolic Panel [[id number]]  (Abnormal) Collected: 09/30/20 1316    Specimen: Blood Updated: 09/30/20 1347     Glucose 96 mg/dL      BUN 22 mg/dL      Creatinine 1.09 mg/dL      Sodium 143 mmol/L      Potassium 4.2 mmol/L      Chloride 104 mmol/L      CO2 30.0 mmol/L      Calcium 9.2 mg/dL      eGFR Non African Amer 69 mL/min/1.73      BUN/Creatinine Ratio 20.2     Anion Gap 9.0 mmol/L     Narrative:      GFR Normal >60  Chronic Kidney Disease <60  Kidney Failure <15      Troponin [[id number]]  (Normal) Collected: 09/30/20 1316    Specimen: Blood Updated: 09/30/20 1347     Troponin T <0.010 ng/mL     Narrative:      Troponin T Reference Range:  <= 0.03  ng/mL-   Negative for AMI  >0.03 ng/mL-     Abnormal for myocardial necrosis.  Clinicians would have to utilize clinical acumen, EKG, Troponin and serial changes to determine if it is an Acute Myocardial Infarction or myocardial injury due to an underlying chronic condition.       Results may be falsely decreased if patient taking Biotin.      Lipid Panel [[id number]]  (Abnormal) Collected: 09/30/20 1316    Specimen: Blood Updated: 09/30/20 1347     Total Cholesterol 211 mg/dL      Triglycerides 172 mg/dL      HDL Cholesterol 44 mg/dL      LDL Cholesterol  133 mg/dL      VLDL Cholesterol 34.4 mg/dL      LDL/HDL Ratio 3.01    Narrative:      Cholesterol Reference Ranges  (U.S. Department of Health and Human Services ATP III Classifications)    Desirable          <200 mg/dL  Borderline High    200-239 mg/dL  High Risk          >240 mg/dL      Triglyceride Reference Ranges  (U.S. Department of Health and Human Services ATP III Classifications)    Normal           <150 mg/dL  Borderline High  150-199 mg/dL  High             200-499 mg/dL  Very High        >500 mg/dL    HDL Reference Ranges  (U.S. Department of Health and Human Services ATP III Classifcations)    Low     <40 mg/dl (major risk factor for CHD)  High    >60 mg/dl ('negative' risk factor for CHD)        LDL Reference Ranges  (U.S. Department of Health and Human Services ATP III Classifcations)    Optimal          <100 mg/dL  Near Optimal     100-129 mg/dL  Borderline High  130-159 mg/dL  High             160-189 mg/dL  Very High        >189 mg/dL    CK Total & CKMB [[id number]]  (Normal) Collected: 09/30/20 0836    Specimen: Blood Updated: 09/30/20 1312     CKMB 2.19 ng/mL      Creatine Kinase 92 U/L     Narrative:      CKMB results may be falsely decreased if patient taking Biotin.    Lipid Panel [[id number]]  (Abnormal) Collected: 09/30/20 0836    Specimen: Blood Updated: 09/30/20 1312     Total Cholesterol 213 mg/dL      Triglycerides 130 mg/dL      HDL  Cholesterol 43 mg/dL      LDL Cholesterol  144 mg/dL      VLDL Cholesterol 26 mg/dL      LDL/HDL Ratio 3.35    Narrative:      Cholesterol Reference Ranges  (U.S. Department of Health and Human Services ATP III Classifications)    Desirable          <200 mg/dL  Borderline High    200-239 mg/dL  High Risk          >240 mg/dL      Triglyceride Reference Ranges  (U.S. Department of Health and Human Services ATP III Classifications)    Normal           <150 mg/dL  Borderline High  150-199 mg/dL  High             200-499 mg/dL  Very High        >500 mg/dL    HDL Reference Ranges  (U.S. Department of Health and Human Services ATP III Classifcations)    Low     <40 mg/dl (major risk factor for CHD)  High    >60 mg/dl ('negative' risk factor for CHD)        LDL Reference Ranges  (U.S. Department of Health and Human Services ATP III Classifcations)    Optimal          <100 mg/dL  Near Optimal     100-129 mg/dL  Borderline High  130-159 mg/dL  High             160-189 mg/dL  Very High        >189 mg/dL    COVID-19, [facility] IN-HOUSE, NP SWAB IN TRANSPORT MEDIA 8-10 HR TAT - Swab, Nasopharynx [[id number]]  (Normal) Collected: 09/30/20 0938    Specimen: Swab from Nasopharynx Updated: 09/30/20 1259     COVID19 Not Detected    Narrative:      Testing performed by Real Time RT-PCR  This test has not been approved by the [facility] but is authorized under the Emergency Use Act (EUA)    Fact sheet for providers: https://www.fda.gov/media/731715/download    Fact sheet for patients: https://www.fda.gov/media/827549/download        [place] Draw [[id number]] Collected: 09/30/20 0750    Specimen: Blood Updated: 09/30/20 0945    Narrative:      The following orders were created for panel order [place] Draw.  Procedure                               Abnormality         Status                     ---------                               -----------         ------                     Light Blue Top[[id number]]                                   Final  result               Green Top (Gel)[[id number]]                                  Final result               Lavender Top[[id number]]                                     Final result               Gold Top - SST[[id number]]                                   Final result                 Please view results for these tests on the individual orders.    Light Blue Top [[id number]] Collected: 09/30/20 0836    Specimen: Blood Updated: 09/30/20 0945     Extra Tube hold for add-on     Comment: Auto resulted       Green Top (Gel) [[id number]] Collected: 09/30/20 0836    Specimen: Blood Updated: 09/30/20 0945     Extra Tube Hold for add-ons.     Comment: Auto resulted.       Gold Top - SST [[id number]] Collected: 09/30/20 0836    Specimen: Blood Updated: 09/30/20 0945     Extra Tube Hold for add-ons.     Comment: Auto resulted.       Extra Tubes [[id number]] Collected: 09/30/20 0836    Specimen: Blood Updated: 09/30/20 0945    Narrative:      The following orders were created for panel order Extra Tubes.  Procedure                               Abnormality         Status                     ---------                               -----------         ------                     Lavender Top[[id number]]                                     Final result                 Please view results for these tests on the individual orders.    Lavender Top [[id number]] Collected: 09/30/20 0836    Specimen: Blood Updated: 09/30/20 0945     Extra Tube hold for add-on     Comment: Auto resulted       Troponin [[id number]]  (Normal) Collected: 09/30/20 0836    Specimen: Blood Updated: 09/30/20 0902     Troponin T <0.010 ng/mL     Narrative:      Troponin T Reference Range:  <= 0.03 ng/mL-   Negative for AMI  >0.03 ng/mL-     Abnormal for myocardial necrosis.  Clinicians would have to utilize clinical acumen, EKG, Troponin and serial changes to determine if it is an Acute Myocardial Infarction or myocardial injury due to an underlying chronic condition.        Results may be falsely decreased if patient taking Biotin.      Comprehensive Metabolic Panel [[id number]]  (Abnormal) Collected: 09/30/20 0836    Specimen: Blood Updated: 09/30/20 0902     Glucose 115 mg/dL      BUN 24 mg/dL      Creatinine 1.08 mg/dL      Sodium 142 mmol/L      Potassium 4.7 mmol/L      Chloride 105 mmol/L      CO2 29.0 mmol/L      Calcium 9.5 mg/dL      Total Protein 6.5 g/dL      Albumin 4.30 g/dL      ALT (SGPT) 11 U/L      AST (SGOT) 12 U/L      Alkaline Phosphatase 48 U/L      Total Bilirubin 0.3 mg/dL      eGFR Non African Amer 69 mL/min/1.73      Globulin 2.2 gm/dL      A/G Ratio 2.0 g/dL      BUN/Creatinine Ratio 22.2     Anion Gap 8.0 mmol/L     Narrative:      GFR Normal >60  Chronic Kidney Disease <60  Kidney Failure <15      BNP [[id number]]  (Normal) Collected: 09/30/20 0836    Specimen: Blood Updated: 09/30/20 0900     proBNP 253.0 pg/mL     Narrative:      Among patients with dyspnea, NT-proBNP is highly sensitive for the detection of acute congestive heart failure. In addition NT-proBNP of <300 pg/ml effectively rules out acute congestive heart failure with 99% negative predictive value.    Results may be falsely decreased if patient taking Biotin.      Lavender Top [[id number]] Collected: 09/30/20 0750    Specimen: Blood Updated: 09/30/20 0900     Extra Tube hold for add-on     Comment: Auto resulted       CBC & Differential [[id number]]  (Normal) Collected: 09/30/20 0750    Specimen: Blood Updated: 09/30/20 0758    Narrative:      The following orders were created for panel order CBC & Differential.  Procedure                               Abnormality         Status                     ---------                               -----------         ------                     CBC Auto Differential[[id number]]        Normal              Final result                 Please view results for these tests on the individual orders.    CBC Auto Differential [[id number]]  (Normal) Collected:  09/30/20 0750    Specimen: Blood Updated: 09/30/20 0758     WBC 8.59 10*3/mm3      RBC 5.13 10*6/mm3      Hemoglobin 15.5 g/dL      Hematocrit 45.9 %      MCV 89.5 fL      MCH 30.2 pg      MCHC 33.8 g/dL      RDW 12.9 %      RDW-SD 42.3 fl      MPV 9.6 fL      Platelets 173 10*3/mm3      Neutrophil % 67.3 %      Lymphocyte % 21.5 %      Monocyte % 8.7 %      Eosinophil % 1.4 %      Basophil % 0.6 %      Immature Grans % 0.5 %      Neutrophils, Absolute 5.78 10*3/mm3      Lymphocytes, Absolute 1.85 10*3/mm3      Monocytes, Absolute 0.75 10*3/mm3      Eosinophils, Absolute 0.12 10*3/mm3      Basophils, Absolute 0.05 10*3/mm3      Immature Grans, Absolute 0.04 10*3/mm3      nRBC 0.0 /100 WBC         Imaging Results (Last 7 Days)     Procedure Component Value Units Date/Time    XR Chest 2 View [[id number]] Collected: 09/30/20 0806     Updated: 09/30/20 0829    Narrative:          PROCEDURE: Chest PA and lateral    REASON FOR EXAM: Chest Pain Triage Protocol    FINDINGS: Comparison study dated June 7, 2019.  Stable  postsurgical changes consistent with prior median sternotomy.   Cardiac and pulmonary vasculature are normal . Lungs are clear.  Pleural spaces are normal . No acute osseous abnormality.      Impression:      No acute cardiopulmonary abnormality.    Electronically signed by:  Parth Rangel MD  9/30/2020 8:28 AM CDT  Workstation: [id number]             HOSPITAL COURSE:  Patient entered the ED on 9/30/2020 with primary complaint of chest tightness and pressure that had previously been intermittent for 3 weeks but as of 0400 that morning it been constant and severe.  He was appropriately triaged in the ED and his history was taken.  Twelve-lead EKG showed nonspecific ST and T wave changes.  Initial troponins were negative.  BNP was normal.  Chest x-ray showed no acute cardiopulmonary abnormality.  Lab work was generally unremarkable.  Family medicine residency was consulted.  Dr. Kwaku Funk went to the ED with  his attending and admitted the patient after reviewing all aspects of his chief complaint and history, and performing a thorough examination.  Serial troponins were taken and subsequently all found to be negative.  Patient was scheduled for Lexiscan stress test with myocardial perfusion after consulting with cardiology.  The patient spent the night comfortably in the hospital with no overnight events.  The following day he was again seen by family medicine residency program.  He noted significant improvement in his condition.  Lexiscan stress test was done on 10/1/2020.  This test was read as normal with ejection fraction of 65%.  Since the patient was stable with no further complaints he was discharged on 10/1/2020 with advice to follow-up with cardiology within a week.  He was also advised to follow-up with his primary care physician.    DISCHARGE CONDITION:   Stable    DISPOSITION:  Home or Self Care    DISCHARGE MEDICATIONS     Discharge Medications      Continue These Medications      Instructions Start Date   acetaminophen 325 MG tablet  Commonly known as: TYLENOL   650 mg, Oral, Every 6 Hours PRN      albuterol sulfate  (90 Base) MCG/ACT inhaler  Commonly known as: PROVENTIL HFA;VENTOLIN HFA;PROAIR HFA   2 puffs, Inhalation, Every 4 Hours PRN      aspirin 325 MG tablet   325 mg, Oral, Every 12 Hours Scheduled      Biotin 5000 MCG tablet   5,000 mcg, Oral, Daily, 2 tablets      Cartia  MG 24 hr capsule  Generic drug: dilTIAZem CD   180 mg, Oral, Nightly      diphenhydrAMINE 25 mg capsule  Commonly known as: BENADRYL   25 mg, Oral, Every 6 Hours PRN      HumuLIN N 100 UNIT/ML injection  Generic drug: insulin NPH   42 Units, Subcutaneous, 2 Times Daily      losartan 50 MG tablet  Commonly known as: COZAAR   50 mg, Oral, Every 24 Hours Scheduled      NovoLIN R 100 UNIT/ML injection  Generic drug: insulin regular   22 Units, Injection, 2 Times Daily      oxyCODONE-acetaminophen  MG per  tablet  Commonly known as: PERCOCET   1 tablet, Oral, Every 6 Hours PRN      raNITIdine 300 MG tablet  Commonly known as: ZANTAC   300 mg, Oral, 2 Times Daily      tadalafil 20 MG tablet tablet  Commonly known as: ADCIRCA   5 mg, Oral, Every 24 Hours Scheduled, 1 tablet daily for BPH       tiotropium 18 MCG per inhalation capsule  Commonly known as: SPIRIVA   1 capsule, Inhalation, Daily - RT      Wixela Inhub 250-50 MCG/DOSE DISKUS  Generic drug: fluticasone-salmeterol   Inhalation, 2 Times Daily - RT             INSTRUCTIONS:  Activity:   Activity Instructions     Activity as Tolerated          Diet:   Diet Instructions     Diet: Cardiac      Discharge Diet: Cardiac          FOLLOW UP:   Additional Instructions for the Follow-ups that You Need to Schedule     Discharge Follow-up with PCP   As directed       Currently Documented PCP:    Tameka Billingsley APRN    PCP Phone Number:    [phone]     Follow Up Details: Follow up with PCP in 1 week         Discharge Follow-up with Specified Provider: Dr. Weems (cardiology); 1 Week   As directed      To: Dr. Weems (cardiology)    Follow Up: 1 Week           Follow-up Information     Tameka Billingsley APRN Follow up.    Specialty: Nurse Practitioner  Why: OFFICE WILL CALL TO SCHEDULE HOSPITAL FOLLOW UP APPOINTMENT  Contact information:  [address] [facility] [id number] [phone]             Gordo Resendez MD Follow up.    Specialty: Cardiology  Why: OFFICE WILL CALL TO SCHEDULE HOSPITAL FOLLOW UP APPOINTMENT  Contact information:  [address] [place] [address] [address]  [address]  [place] [address] [phone]             Tameka Billingsley APRN .    Specialty: Nurse Practitioner  Contact information:  [address] [facility] [place] [id number] [phone]                   PENDING TEST RESULTS AT DISCHARGE      Time: >30 minutes was spent in discharge planning, medication reconciliation and coordination of care for this patient.    Virgen Clark MD is  the attending at time of discharge, She is aware of the patient's status and agrees with the above discharge summary.        This document has been electronically signed by Kwaku Funk MD on October 2, 2020 14:05 CDT

## 2020-12-03 ENCOUNTER — OFFICE VISIT (OUTPATIENT)
Dept: CARDIOLOGY | Facility: CLINIC | Age: 60
End: 2020-12-03

## 2020-12-03 VITALS
DIASTOLIC BLOOD PRESSURE: 65 MMHG | BODY MASS INDEX: 33.04 KG/M2 | SYSTOLIC BLOOD PRESSURE: 122 MMHG | HEIGHT: 68 IN | HEART RATE: 73 BPM | WEIGHT: 218 LBS

## 2020-12-03 DIAGNOSIS — Z95.1 S/P CABG (CORONARY ARTERY BYPASS GRAFT): ICD-10-CM

## 2020-12-03 DIAGNOSIS — I10 ESSENTIAL HYPERTENSION: Primary | ICD-10-CM

## 2020-12-03 DIAGNOSIS — I73.9 PAD (PERIPHERAL ARTERY DISEASE) (HCC): ICD-10-CM

## 2020-12-03 DIAGNOSIS — I73.9 PVD (PERIPHERAL VASCULAR DISEASE) WITH CLAUDICATION (HCC): ICD-10-CM

## 2020-12-03 DIAGNOSIS — Z72.0 TOBACCO ABUSE: ICD-10-CM

## 2020-12-03 DIAGNOSIS — E78.2 MIXED HYPERLIPIDEMIA: ICD-10-CM

## 2020-12-03 DIAGNOSIS — I25.118 CORONARY ARTERY DISEASE OF NATIVE HEART WITH STABLE ANGINA PECTORIS, UNSPECIFIED VESSEL OR LESION TYPE (HCC): ICD-10-CM

## 2020-12-03 PROCEDURE — 99442 PR PHYS/QHP TELEPHONE EVALUATION 11-20 MIN: CPT | Performed by: INTERNAL MEDICINE

## 2020-12-03 RX ORDER — AMLODIPINE BESYLATE 5 MG/1
TABLET ORAL
COMMUNITY
End: 2020-12-03 | Stop reason: SDUPTHER

## 2020-12-03 RX ORDER — AMLODIPINE BESYLATE 5 MG/1
5 TABLET ORAL DAILY
Qty: 90 TABLET | Refills: 3 | Status: SHIPPED | OUTPATIENT
Start: 2020-12-03 | End: 2021-07-01

## 2020-12-03 NOTE — PROGRESS NOTES
Paddy Brantley  60 y.o. male    12/03/2020  1. Essential hypertension    2. Mixed hyperlipidemia    3. Coronary artery disease of native heart with stable angina pectoris, unspecified vessel or lesion type (CMS/[facility])    4. S/P CABG (coronary artery bypass graft)    5. Tobacco abuse    6. PAD (peripheral artery disease) (CMS/[facility])    7. PVD (peripheral vascular disease) with claudication (CMS/[facility])        History of Present Illness  This visit has been rescheduled as a phone visit to comply with patient safety concerns in accordance with CDC recommendations. Total time of discussion was 18 minutes.    You have chosen to receive care through a telephone visit. Do you consent to use a telephone visit for your medical care today? Yes    Mr. Brantley is a 58-year-old male with multiple medical issues including coronary artery disease status post CABG, multiple PCI prior to CABG, GERD, diabetes mellitus, chronic pain syndrome, nicotine dependence, BPH, multiple drug allergies.    The patient underwent coronary artery bypass surgery in November 2016 when he received LIMA to LAD, SVG to OM, SVG to diagonal and SVG to PDA.  He developed multiple issues postoperatively including left hemothorax which had to be drained.  In October 2017 he had a fracture/nonunion mid sternal body and underwent sternal revision and repair with sternal plates in December 2017.    He was admitted for evaluation of chest discomfort in October 2020 and cardiac enzymes were negative for myocardial injury.  He underwent the following tests:  Lexiscan Cardiolite stress test in October 2020 showed:  · Findings consistent with an equivocal ECG stress test.  · Left ventricular ejection fraction is normal. (Calculated EF = 65%).  · Myocardial perfusion imaging indicates a normal myocardial perfusion study with no evidence of ischemia.  · Impressions are consistent with a low risk study.    He denies any chest pain or shortness of breath and has been  "compliant with his medications.  His predominant symptom referred to claudication in his left lower extremity.  He unfortunately continues to smoke and does not wish to quit.  His heart rate and blood pressure were in the normal range.    SUBJECTIVE    Allergies   Allergen Reactions   • Amiodarone Anaphylaxis   • Contrast Dye Anaphylaxis   • Iodine Anaphylaxis   • Multivitamins Anaphylaxis     Oyster calcium   • Pepcid [Famotidine] Shortness Of Breath     Pt has taken before with adverse reaction   • Proton Pump Inhibitors Shortness Of Breath   • Shellfish-Derived Products Anaphylaxis   • Dexamethasone Other (See Comments)     hiccups   • Livalo [Pitavastatin] Swelling   • Plavix [Clopidogrel Bisulfate] Other (See Comments)     Excessive bleeding   • Statins Swelling   • Tetracyclines & Related Other (See Comments)     \"skin falls off\"   • Penicillins Rash         Past Medical History:   Diagnosis Date   • Accident caused by hypodermic needle    • Guerra esophagus    • Contact with and (suspected) exposure to potentially hazardous body fluids    • Diabetes mellitus (CMS/HCC)    • GERD (gastroesophageal reflux disease)    • Kidney stones    • Meniere's disease    • MRSA infection     right ing hernia   • PAD (peripheral artery disease) (CMS/HCC)          Past Surgical History:   Procedure Laterality Date   • APPENDECTOMY     • CARDIAC CATHETERIZATION     • CARDIAC SURGERY  11/22/2016    CABG   • CHEST TUBE INSERTION     • CORONARY ARTERY BYPASS GRAFT     • GALLBLADDER SURGERY     • INCISION AND DRAINAGE ABSCESS     • INGUINAL HERNIA REPAIR Bilateral    • LUMBAR LAMINECTOMY     • STERNAL REWIRING N/A 12/19/2017    Procedure: REPAIR STERNAL DEHISCENCE WITH STERNAL DEBRIDEMENT  Need plate and screws;  Surgeon: Homero Sutton MD;  Location: [facility];  Service:    • UMBILICAL HERNIA REPAIR           History reviewed. No pertinent family history.      Social History     Socioeconomic History   • Marital status: " Single     Spouse name: Not on file   • Number of children: Not on file   • Years of education: Not on file   • Highest education level: Not on file   Tobacco Use   • Smoking status: Current Every Day Smoker     Packs/day: 0.50     Years: 40.00     Pack years: 20.00     Types: Cigarettes     Last attempt to quit: 1/3/2017     Years since quitting: 3.9   • Smokeless tobacco: Never Used   Substance and Sexual Activity   • Alcohol use: No   • Drug use: No   • Sexual activity: Defer         Current Outpatient Medications   Medication Sig Dispense Refill   • acetaminophen (TYLENOL) 325 MG tablet Take 2 tablets by mouth Every 6 (Six) Hours As Needed for Mild Pain .     • albuterol sulfate  (90 Base) MCG/ACT inhaler Inhale 2 puffs Every 4 (Four) Hours As Needed for Wheezing.     • amLODIPine (Norvasc) 5 MG tablet Take 1 tablet by mouth Daily. 90 tablet 3   • aspirin 325 MG tablet Take 325 mg by mouth Every 12 (Twelve) Hours.     • Biotin 5000 MCG tablet Take 5,000 mcg by mouth Daily. 2 tablets     • CARTIA  MG 24 hr capsule TAKE 1 CAPSULE BY MOUTH EVERY NIGHT 90 capsule 0   • diphenhydrAMINE (BENADRYL) 25 mg capsule Take 25 mg by mouth Every 6 (Six) Hours As Needed for Itching.     • fluticasone-salmeterol (Wixela Inhub) 250-50 MCG/DOSE DISKUS Inhale 2 (Two) Times a Day.     • insulin NPH (HUMULIN N) 100 UNIT/ML injection Inject 42 Units under the skin 2 (Two) Times a Day.     • NOVOLIN R 100 UNIT/ML injection Inject 22 Units as directed 2 (Two) Times a Day.     • oxyCODONE-acetaminophen (PERCOCET)  MG per tablet Take 1 tablet by mouth Every 6 (Six) Hours As Needed for Moderate Pain . 15 tablet 0   • tadalafil (ADCIRCA) 20 MG tablet tablet Take 5 mg by mouth Daily. 1 tablet daily for BPH     • tiotropium (SPIRIVA) 18 MCG per inhalation capsule Place 1 capsule into inhaler and inhale Daily.       No current facility-administered medications for this visit.          OBJECTIVE    /65 Comment: this was  "taken at home  Pulse 73 Comment: this was taken at home  Ht 172.7 cm (67.99\")   Wt 98.9 kg (218 lb)   BMI 33.15 kg/m²         Review of Systems     Constitutional:  Denies recent weight loss, weight gain, fever or chills     HENT:  Denies any hearing loss, epistaxis, hoarseness, or difficulty speaking.     Eyes: Wears eyeglasses or contact lenses     Respiratory:  Denies dyspnea with exertion,no cough, wheezing, or hemoptysis.     Cardiovascular: Negative for palpations, chest pain, orthopnea, PND, peripheral edema.  See HPI    Gastrointestinal:  Denies change in bowel habits, dyspepsia, ulcer disease, hematochezia, or melena.     Endocrine: Negative for cold intolerance, heat intolerance, polydipsia, polyphagia and polyuria.     Genitourinary: Negative.      Musculoskeletal: Denies any history of arthritic symptoms or back problems     Neurological:  Denies any history of recurrent headaches, strokes, TIA, or seizure disorder.       Procedures      Lab Results   Component Value Date    WBC 7.33 10/01/2020    HGB 16.5 10/01/2020    HCT 49.9 10/01/2020    MCV 89.1 10/01/2020     10/01/2020     Lab Results   Component Value Date    GLUCOSE 132 (H) 10/01/2020    BUN 25 (H) 10/01/2020    CREATININE 1.17 10/01/2020    EGFRIFNONA 63 10/01/2020    BCR 21.4 10/01/2020    CO2 25.0 10/01/2020    CALCIUM 9.6 10/01/2020    ALBUMIN 4.30 09/30/2020    AST 12 09/30/2020    ALT 11 09/30/2020     Lab Results   Component Value Date    CHOL 211 (H) 09/30/2020    CHOL 213 (H) 09/30/2020    CHOL 224 (H) 06/07/2019     Lab Results   Component Value Date    TRIG 172 (H) 09/30/2020    TRIG 130 09/30/2020    TRIG 120 06/07/2019     Lab Results   Component Value Date    HDL 44 09/30/2020    HDL 43 09/30/2020    HDL 48 06/07/2019     No components found for: LDLCALC  Lab Results   Component Value Date     (H) 09/30/2020     (H) 09/30/2020     (H) 06/07/2019     No results found for: HDLLDLRATIO  No components " found for: CHOLHDL  Lab Results   Component Value Date    HGBA1C 5.60 09/30/2020     Lab Results   Component Value Date    TSH 0.21 (L) 11/18/2016    E6POJUQ 96 (L) 11/18/2016    K3OGBHY 6.3 11/18/2016           ASSESSMENT AND PLAN  Paddy Brantley has multiple medical issues as discussed in detail under history of present illness but is stable from a cardiac standpoint with no angina or arrhythmia based on symptoms.  He does have claudication involving his left lower extremity and to further evaluate this and DAISHA is being arranged.   I have continued antiplatelet therapy with aspirin, antihypertensive therapy with Cartia XT and amlodipine.  The patient does not wish to take statins.  He unfortunately continues to smoke and does not wish to quit.    Diagnoses and all orders for this visit:    1. Essential hypertension (Primary)    2. Mixed hyperlipidemia    3. Coronary artery disease of native heart with stable angina pectoris, unspecified vessel or lesion type (CMS/[facility])    4. S/P CABG (coronary artery bypass graft)    5. Tobacco abuse    6. PAD (peripheral artery disease) (CMS/[facility])    7. PVD (peripheral vascular disease) with claudication (CMS/[facility])  -     Doppler Arterial Multi Level Lower Extremity - Bilateral CAR; Future    Other orders  -     amLODIPine (Norvasc) 5 MG tablet; Take 1 tablet by mouth Daily.  Dispense: 90 tablet; Refill: 3        Patient's Body mass index is 33.15 kg/m². BMI is above normal parameters. Recommendations include: exercise counseling and nutrition counseling.      Paddy Brantley  reports that he has been smoking cigarettes. He has a 20.00 pack-year smoking history. He has never used smokeless tobacco.. I have educated him on the risk of diseases from using tobacco products such as cancer, COPD and heart disease.     I advised him to quit and he is not willing to quit.    I spent 3  minutes counseling the patient.       Gordo Resendez MD  12/3/2020  15:19 CST

## 2020-12-10 DIAGNOSIS — I73.9 PVD (PERIPHERAL VASCULAR DISEASE) WITH CLAUDICATION (HCC): Primary | ICD-10-CM

## 2020-12-10 NOTE — PROGRESS NOTES
DAISHA results to patient    Per Dr Weems  Need referral to vascular surgery  Patient wishes to see [place]    Referral order placed

## 2021-01-28 ENCOUNTER — OFFICE VISIT (OUTPATIENT)
Dept: CARDIAC SURGERY | Facility: CLINIC | Age: 61
End: 2021-01-28

## 2021-01-28 ENCOUNTER — PREP FOR SURGERY (OUTPATIENT)
Dept: OTHER | Facility: HOSPITAL | Age: 61
End: 2021-01-28

## 2021-01-28 VITALS
BODY MASS INDEX: 33.34 KG/M2 | HEART RATE: 75 BPM | OXYGEN SATURATION: 98 % | TEMPERATURE: 99 F | SYSTOLIC BLOOD PRESSURE: 126 MMHG | WEIGHT: 220 LBS | HEIGHT: 68 IN | DIASTOLIC BLOOD PRESSURE: 60 MMHG

## 2021-01-28 DIAGNOSIS — I73.9 PAD (PERIPHERAL ARTERY DISEASE) (HCC): ICD-10-CM

## 2021-01-28 DIAGNOSIS — Z95.1 S/P CABG (CORONARY ARTERY BYPASS GRAFT): ICD-10-CM

## 2021-01-28 DIAGNOSIS — N18.2 CHRONIC KIDNEY DISEASE, STAGE 2 (MILD): ICD-10-CM

## 2021-01-28 DIAGNOSIS — E78.2 MIXED HYPERLIPIDEMIA: ICD-10-CM

## 2021-01-28 DIAGNOSIS — I25.118 CORONARY ARTERY DISEASE OF NATIVE ARTERY OF NATIVE HEART WITH STABLE ANGINA PECTORIS (HCC): ICD-10-CM

## 2021-01-28 DIAGNOSIS — E11.59 TYPE 2 DIABETES MELLITUS WITH OTHER CIRCULATORY COMPLICATION, WITH LONG-TERM CURRENT USE OF INSULIN (HCC): ICD-10-CM

## 2021-01-28 DIAGNOSIS — Z79.4 TYPE 2 DIABETES MELLITUS WITH OTHER CIRCULATORY COMPLICATION, WITH LONG-TERM CURRENT USE OF INSULIN (HCC): ICD-10-CM

## 2021-01-28 DIAGNOSIS — I73.9 PVD (PERIPHERAL VASCULAR DISEASE) (HCC): Primary | ICD-10-CM

## 2021-01-28 DIAGNOSIS — I10 ESSENTIAL HYPERTENSION: ICD-10-CM

## 2021-01-28 PROCEDURE — 99205 OFFICE O/P NEW HI 60 MIN: CPT | Performed by: THORACIC SURGERY (CARDIOTHORACIC VASCULAR SURGERY)

## 2021-01-28 RX ORDER — PREDNISONE 50 MG/1
50 TABLET ORAL EVERY 6 HOURS SCHEDULED
Qty: 3 TABLET | Refills: 0 | Status: SHIPPED | OUTPATIENT
Start: 2021-01-28 | End: 2021-03-22

## 2021-02-01 ENCOUNTER — TRANSCRIBE ORDERS (OUTPATIENT)
Dept: CT IMAGING | Facility: HOSPITAL | Age: 61
End: 2021-02-01

## 2021-02-01 DIAGNOSIS — I73.9 PERIPHERAL VASCULAR DISEASE, UNSPECIFIED (HCC): Primary | ICD-10-CM

## 2021-02-03 ENCOUNTER — HOSPITAL ENCOUNTER (OUTPATIENT)
Dept: CT IMAGING | Facility: HOSPITAL | Age: 61
Discharge: HOME | End: 2021-02-03

## 2021-02-03 ENCOUNTER — LAB (OUTPATIENT)
Dept: LAB | Facility: HOSPITAL | Age: 61
End: 2021-02-03

## 2021-02-03 VITALS
DIASTOLIC BLOOD PRESSURE: 63 MMHG | RESPIRATION RATE: 18 BRPM | OXYGEN SATURATION: 96 % | SYSTOLIC BLOOD PRESSURE: 149 MMHG | HEART RATE: 92 BPM

## 2021-02-03 DIAGNOSIS — I73.9 PVD (PERIPHERAL VASCULAR DISEASE) (HCC): ICD-10-CM

## 2021-02-03 DIAGNOSIS — I73.9 PERIPHERAL VASCULAR DISEASE, UNSPECIFIED (HCC): ICD-10-CM

## 2021-02-03 LAB
BUN SERPL-MCNC: 28 MG/DL (ref 8–23)
CREAT SERPL-MCNC: 1.24 MG/DL (ref 0.76–1.27)
GFR SERPL CREATININE-BSD FRML MDRD: 59 ML/MIN/1.73

## 2021-02-03 PROCEDURE — 0 IOPAMIDOL PER 1 ML: Performed by: THORACIC SURGERY (CARDIOTHORACIC VASCULAR SURGERY)

## 2021-02-03 PROCEDURE — 82565 ASSAY OF CREATININE: CPT

## 2021-02-03 PROCEDURE — 36415 COLL VENOUS BLD VENIPUNCTURE: CPT

## 2021-02-03 PROCEDURE — 75635 CT ANGIO ABDOMINAL ARTERIES: CPT

## 2021-02-03 PROCEDURE — 84520 ASSAY OF UREA NITROGEN: CPT

## 2021-02-03 RX ORDER — DIPHENHYDRAMINE HYDROCHLORIDE 50 MG/ML
INJECTION INTRAMUSCULAR; INTRAVENOUS
Status: DISCONTINUED
Start: 2021-02-03 | End: 2021-02-03 | Stop reason: WASHOUT

## 2021-02-03 RX ADMIN — IOPAMIDOL 90 ML: 755 INJECTION, SOLUTION INTRAVENOUS at 09:41

## 2021-02-06 PROBLEM — I73.9 PVD (PERIPHERAL VASCULAR DISEASE) WITH CLAUDICATION (HCC): Status: RESOLVED | Noted: 2020-12-03 | Resolved: 2021-02-06

## 2021-02-06 NOTE — PROGRESS NOTES
1/28/2021    Paddy Brantley  1960    Chief Complaint:    Chief Complaint   Patient presents with   • Peripheral Vascular Disease       HPI:      PCP:  Provider, No Known  Cardiology: Dr. Resendez     60y.o. male with HTN(uncontrolled, increased risk stroke, rupture), Diabetes Mellitus(stable, increased risk cardiovascular events), Obesity(uncontrolled, increased risk cardiovascular events), Smoker(uncontrolled, increased risk cardiovascular events), COPD(stable, increased risk pulmonary complications) and Chronic Kidney Disease(stable, increased risk renal failure) CAD(stable, increase risk cardiovascular events).  smokes 1 PPD. moderate leg pain walking 50ft x4yrs, worse past 6 months. Worse walking incline. Mild LEFT leg swelling (EVH) developed sternal instability after fall following CABG 2016 underwent sternal revision with plating in December 2017. fall in which his sternum made first contact with the ground followed by his head, chest x-ray revealed fracture of sternal plating at the base of the sternum, he has significant pain, reports inability to perform job duties effectively due to his pain.  Off 6 weeks from work, feels unable to return due to pain intolerance. Pain has since improved, feels lower sternal area pop/click.  No other associated signs, symptoms or modifying factors.     11/2016: Carotid duplex: BICA <50%      11/2016 echocardiogram: EF 50-55%, mild MAC, trace MR, diastolic dysfunction  11/2016 Cardiac Catheterization: 70% LAD, 70% circumflex, 80% RCA, 70% diagonal  11/2016 CABG x4 LIMA-LAD, SVG-OM, SVG-DX, SVG-PDA, EVH  12/2017 Sternal revision and Repair with Sternal Plates.   3/2019 CXR:  Broken 4th place distal sternum  7/2019 CT Chest:  First 3 sternal plates intact.  Lower sternal separation with broken plate.  No change or migration of hardware.      The following portions of the patient's history were reviewed and updated as appropriate: allergies, current medications, past  family history, past medical history, past social history, past surgical history and problem list.  Recent images independently reviewed.  Available laboratory values reviewed.    PMH:  Past Medical History:   Diagnosis Date   • Accident caused by hypodermic needle    • Guerra esophagus    • Contact with and (suspected) exposure to potentially hazardous body fluids    • Diabetes mellitus (CMS/HCC)    • GERD (gastroesophageal reflux disease)    • Kidney stones    • Meniere's disease    • MRSA infection     right ing hernia   • PAD (peripheral artery disease) (CMS/HCC)      Past Surgical History:   Procedure Laterality Date   • APPENDECTOMY     • CARDIAC CATHETERIZATION     • CARDIAC SURGERY  2016    CABG   • CHEST TUBE INSERTION     • CORONARY ARTERY BYPASS GRAFT     • GALLBLADDER SURGERY     • INCISION AND DRAINAGE ABSCESS     • INGUINAL HERNIA REPAIR Bilateral    • LUMBAR LAMINECTOMY     • STERNAL REWIRING N/A 2017    Procedure: REPAIR STERNAL DEHISCENCE WITH STERNAL DEBRIDEMENT  Need plate and screws;  Surgeon: Homero Sutton MD;  Location: [facility];  Service:    • UMBILICAL HERNIA REPAIR       No family history on file.  Social History     Tobacco Use   • Smoking status: Current Every Day Smoker     Packs/day: 0.50     Years: 40.00     Pack years: 20.00     Types: Cigarettes     Last attempt to quit: 1/3/2017     Years since quittin.0   • Smokeless tobacco: Never Used   Substance Use Topics   • Alcohol use: No   • Drug use: No       ALLERGIES:  Allergies   Allergen Reactions   • Amiodarone Anaphylaxis   • Contrast Dye Anaphylaxis   • Iodine Anaphylaxis   • Multivitamins Anaphylaxis     Oyster calcium   • Pepcid [Famotidine] Shortness Of Breath     Pt has taken before with adverse reaction   • Proton Pump Inhibitors Shortness Of Breath   • Shellfish-Derived Products Anaphylaxis   • Dexamethasone Other (See Comments)     hiccups   • Livalo [Pitavastatin] Swelling   • Plavix [Clopidogrel  "Bisulfate] Other (See Comments)     Excessive bleeding   • Statins Swelling   • Tetracyclines & Related Other (See Comments)     \"skin falls off\"   • Penicillins Rash         MEDICATIONS:    Current Outpatient Medications:   •  acetaminophen (TYLENOL) 325 MG tablet, Take 2 tablets by mouth Every 6 (Six) Hours As Needed for Mild Pain ., Disp: , Rfl:   •  albuterol sulfate  (90 Base) MCG/ACT inhaler, Inhale 2 puffs Every 4 (Four) Hours As Needed for Wheezing., Disp: , Rfl:   •  amLODIPine (Norvasc) 5 MG tablet, Take 1 tablet by mouth Daily., Disp: 90 tablet, Rfl: 3  •  aspirin 325 MG tablet, Take 325 mg by mouth Every 12 (Twelve) Hours., Disp: , Rfl:   •  Biotin 5000 MCG tablet, Take 5,000 mcg by mouth Daily. 2 tablets, Disp: , Rfl:   •  CARTIA  MG 24 hr capsule, TAKE 1 CAPSULE BY MOUTH EVERY NIGHT, Disp: 90 capsule, Rfl: 0  •  diphenhydrAMINE (BENADRYL) 25 mg capsule, Take 25 mg by mouth Every 6 (Six) Hours As Needed for Itching., Disp: , Rfl:   •  fluticasone-salmeterol (Wixela Inhub) 250-50 MCG/DOSE DISKUS, Inhale 2 (Two) Times a Day., Disp: , Rfl:   •  insulin NPH (HUMULIN N) 100 UNIT/ML injection, Inject 42 Units under the skin 2 (Two) Times a Day., Disp: , Rfl:   •  NOVOLIN R 100 UNIT/ML injection, Inject 22 Units as directed 2 (Two) Times a Day., Disp: , Rfl:   •  oxyCODONE-acetaminophen (PERCOCET)  MG per tablet, Take 1 tablet by mouth Every 6 (Six) Hours As Needed for Moderate Pain ., Disp: 15 tablet, Rfl: 0  •  tadalafil (ADCIRCA) 20 MG tablet tablet, Take 5 mg by mouth Daily. 1 tablet daily for BPH, Disp: , Rfl:   •  tiotropium (SPIRIVA) 18 MCG per inhalation capsule, Place 1 capsule into inhaler and inhale Daily., Disp: , Rfl:   •  predniSONE (DELTASONE) 50 MG tablet, Take 1 tablet by mouth Every 6 (Six) Hours. Take 13 hours, 7 hours, 1 hour prior to contrast adminstration, Disp: 3 tablet, Rfl: 0    Review of Systems   Review of Systems   Constitution: Negative for malaise/fatigue and " "weight loss.   Cardiovascular: Positive for chest pain and claudication. Negative for dyspnea on exertion.   Respiratory: Negative for cough and shortness of breath.    Skin: Negative for color change and poor wound healing.   Musculoskeletal: Positive for arthritis, back pain and joint pain.   Neurological: Negative for dizziness, numbness and weakness.       Physical Exam   Vitals:    01/28/21 1516   BP: 126/60   BP Location: Left arm   Patient Position: Sitting   Cuff Size: Adult   Pulse: 75   Temp: 99 °F (37.2 °C)   TempSrc: Temporal   SpO2: 98%   Weight: 99.8 kg (220 lb)   Height: 172.7 cm (68\")     Physical Exam  Constitutional:       General: He is not in acute distress.     Appearance: He is not ill-appearing.   HENT:      Right Ear: Hearing normal.      Left Ear: Hearing normal.      Nose: No nasal deformity.      Mouth/Throat:      Dentition: Normal dentition. Does not have dentures.   Cardiovascular:      Rate and Rhythm: Normal rate and regular rhythm.      Pulses:           Carotid pulses are 2+ on the right side and 2+ on the left side.       Radial pulses are 2+ on the right side and 2+ on the left side.        Dorsalis pedis pulses are 1+ on the right side and 1+ on the left side.        Posterior tibial pulses are 1+ on the right side and 1+ on the left side.      Heart sounds: No murmur.   Pulmonary:      Effort: Pulmonary effort is normal.      Breath sounds: Normal breath sounds.   Abdominal:      General: There is no distension.      Palpations: Abdomen is soft. There is no mass.      Tenderness: There is no abdominal tenderness.   Musculoskeletal:         General: No deformity.      Comments: Gait normal.    Skin:     General: Skin is warm and dry.      Coloration: Skin is not pale.      Findings: No erythema.      Comments: No venous staining   Neurological:      Mental Status: He is alert and oriented to person, place, and time.   Psychiatric:         Speech: Speech normal.         Behavior: " Behavior is cooperative.         Thought Content: Thought content normal.         Judgment: Judgment normal.         BUN   Date Value Ref Range Status   02/03/2021 28 (H) 8 - 23 mg/dL Final     Creatinine   Date Value Ref Range Status   02/03/2021 1.24 0.76 - 1.27 mg/dL Final     eGFR Non  Amer   Date Value Ref Range Status   02/03/2021 59 (L) >60 mL/min/1.73 Final       ASSESSMENT:  Diagnoses and all orders for this visit:    1. PVD (peripheral vascular disease) (CMS/[facility]) (Primary)  -     CT Angio Abdominal Aorta Bilateral Iliofem Runoff; Future    2. S/P CABG (coronary artery bypass graft)    3. Essential hypertension    4. PAD (peripheral artery disease) (CMS/[facility])    5. Type 2 diabetes mellitus with other circulatory complication, with long-term current use of insulin (CMS/[facility])    6. Chronic kidney disease, stage 2 (mild)    7. Mixed hyperlipidemia    8. Coronary artery disease of native artery of native heart with stable angina pectoris (CMS/[facility])    PLAN:  Detailed discussion with Paddy Brantley regarding situation and options.  Severe claudication lifestyle limiting.  Additional imaging required to evaluate location and extent of atherosclerotic disease..  Multiple risk factors with severe comorbidities.   Risks, benefits discussed.  Understands and wishes to proceed with plan.    CTA aorta runoff 2/3/2021  Contrast sensitivity protocol  Intolerant to statins, plavix    Return after above studies complete  Smoking cessation advised and assistance options offered including behavioral counseling ([facility] Cessation [facility]), Nicotine replacement therapy (patches or gum), pharmacologic therapy (Chantix, Wellbutrin). patient understands that continued use of tobacco products increases risk of heart disease, stroke, cancer; counseling for 3-5min.  Obesity Class  1. Increased risk cardiovascular events, sleep and breathing disorders, joint issues, type 2 diabetes mellitus. Options for weight  management, heart healthy diet, exercise programs, and associated health risks of obesity discussed.    Recommended regular physical activity, progressive walking program.  Continue current medications as directed.  Advance Care Planning   ACP discussion was declined by the patient. Patient does not have an advance directive, declines further assistance.    Thank you for the opportunity to participate in this patient's care.    Copy to primary care provider.

## 2021-03-22 ENCOUNTER — OFFICE VISIT (OUTPATIENT)
Dept: CARDIAC SURGERY | Facility: CLINIC | Age: 61
End: 2021-03-22

## 2021-03-22 ENCOUNTER — TRANSCRIBE ORDERS (OUTPATIENT)
Dept: GENERAL RADIOLOGY | Facility: HOSPITAL | Age: 61
End: 2021-03-22

## 2021-03-22 VITALS
TEMPERATURE: 97.8 F | DIASTOLIC BLOOD PRESSURE: 66 MMHG | HEART RATE: 85 BPM | WEIGHT: 231 LBS | BODY MASS INDEX: 35.01 KG/M2 | OXYGEN SATURATION: 99 % | SYSTOLIC BLOOD PRESSURE: 150 MMHG | HEIGHT: 68 IN

## 2021-03-22 DIAGNOSIS — E78.5 DYSLIPIDEMIA: ICD-10-CM

## 2021-03-22 DIAGNOSIS — I10 ESSENTIAL HYPERTENSION: ICD-10-CM

## 2021-03-22 DIAGNOSIS — E66.01 CLASS 2 SEVERE OBESITY DUE TO EXCESS CALORIES WITH SERIOUS COMORBIDITY AND BODY MASS INDEX (BMI) OF 35.0 TO 35.9 IN ADULT (HCC): ICD-10-CM

## 2021-03-22 DIAGNOSIS — Z01.818 PRE-OP TESTING: Primary | ICD-10-CM

## 2021-03-22 DIAGNOSIS — Z88.8 ALLERGY TO IODINE: ICD-10-CM

## 2021-03-22 DIAGNOSIS — N18.2 CHRONIC KIDNEY DISEASE, STAGE 2 (MILD): ICD-10-CM

## 2021-03-22 DIAGNOSIS — I73.9 PVD (PERIPHERAL VASCULAR DISEASE) (HCC): Primary | ICD-10-CM

## 2021-03-22 DIAGNOSIS — E11.59 TYPE 2 DIABETES MELLITUS WITH OTHER CIRCULATORY COMPLICATION, WITH LONG-TERM CURRENT USE OF INSULIN (HCC): ICD-10-CM

## 2021-03-22 DIAGNOSIS — E78.2 MIXED HYPERLIPIDEMIA: ICD-10-CM

## 2021-03-22 DIAGNOSIS — Z79.4 TYPE 2 DIABETES MELLITUS WITH OTHER CIRCULATORY COMPLICATION, WITH LONG-TERM CURRENT USE OF INSULIN (HCC): ICD-10-CM

## 2021-03-22 DIAGNOSIS — Z95.1 S/P CABG (CORONARY ARTERY BYPASS GRAFT): ICD-10-CM

## 2021-03-22 PROCEDURE — 99215 OFFICE O/P EST HI 40 MIN: CPT | Performed by: THORACIC SURGERY (CARDIOTHORACIC VASCULAR SURGERY)

## 2021-03-22 RX ORDER — SODIUM CHLORIDE 0.9 % (FLUSH) 0.9 %
3 SYRINGE (ML) INJECTION EVERY 12 HOURS SCHEDULED
Status: CANCELLED | OUTPATIENT
Start: 2021-04-01

## 2021-03-22 RX ORDER — PREDNISONE 50 MG/1
TABLET ORAL
Qty: 3 TABLET | Refills: 0 | Status: SHIPPED | OUTPATIENT
Start: 2021-03-22 | End: 2021-04-12

## 2021-03-22 RX ORDER — PREDNISONE 50 MG/1
TABLET ORAL
Qty: 3 TABLET | Refills: 0 | Status: SHIPPED | OUTPATIENT
Start: 2021-03-22 | End: 2021-03-22

## 2021-03-22 RX ORDER — SODIUM CHLORIDE 0.9 % (FLUSH) 0.9 %
10 SYRINGE (ML) INJECTION AS NEEDED
Status: CANCELLED | OUTPATIENT
Start: 2021-04-01

## 2021-03-22 RX ORDER — DIPHENHYDRAMINE HCL 50 MG
CAPSULE ORAL
Qty: 1 CAPSULE | Refills: 0 | Status: SHIPPED | OUTPATIENT
Start: 2021-03-22 | End: 2021-04-12

## 2021-03-22 RX ORDER — SODIUM CHLORIDE 9 MG/ML
100 INJECTION, SOLUTION INTRAVENOUS CONTINUOUS
Status: CANCELLED | OUTPATIENT
Start: 2021-04-01

## 2021-03-22 NOTE — PROGRESS NOTES
3/22/2021    Paddy Brantley  1960    Chief Complaint:    Chief Complaint   Patient presents with   • Peripheral Vascular Disease       HPI:      PCP: Dr Iniguez   Cardiology: Dr. Resendez     60y.o. male with HTN(uncontrolled, increased risk stroke, rupture), Diabetes Mellitus(stable, increased risk cardiovascular events), Obesity(uncontrolled, increased risk cardiovascular events), Smoker(uncontrolled, increased risk cardiovascular events), COPD(stable, increased risk pulmonary complications) and Chronic Kidney Disease(stable, increased risk renal failure) CAD(stable, increase risk cardiovascular events).  smokes 1 PPD. moderate leg pain walking 50ft x4yrs, worse past 6 months. Worse walking incline. 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 fall in which his sternum made first contact with the ground followed by his head, chest x-ray revealed fracture of sternal plating at the base of the sternum, he has significant pain, reports inability to perform job duties effectively due to his pain.  Off 6 weeks from work, feels unable to return due to pain intolerance. Pain has since improved, feels lower sternal area pop/click.  No other associated signs, symptoms or modifying factors.     11/2016: Carotid duplex: BICA <50%     12/2020 DAISHA:  RIGHT .71 biphasic. , LEFT .59 biphasic.  2/2021 CTA Aorta runoff:  RIGHT SFA 80%, 3v runoff.  LEFT common iliac 90/70%, 3v runoff.     11/2016 echocardiogram: EF 50-55%, mild MAC, trace MR, diastolic dysfunction  11/2016 Cardiac Catheterization: 70% LAD, 70% circumflex, 80% RCA, 70% diagonal  11/2016 CABG x4 LIMA-LAD, SVG-OM, SVG-DX, SVG-PDA, EVH  12/2017 Sternal revision and Repair with Sternal Plates.   3/2019 CXR:  Broken 4th place distal sternum  7/2019 CT Chest:  First 3 sternal plates intact.  Lower sternal separation with broken plate.  No change or migration of  hardware.  10/2020 Stress Test:  EF 65%, no ischemia.  Low risk study.    The following portions of the patient's history were reviewed and updated as appropriate: allergies, current medications, past family history, past medical history, past social history, past surgical history and problem list.  Recent images independently reviewed.  Available laboratory values reviewed.    PMH:  Past Medical History:   Diagnosis Date   • Accident caused by hypodermic needle    • Guerra esophagus    • Contact with and (suspected) exposure to potentially hazardous body fluids    • Diabetes mellitus (CMS/HCC)    • GERD (gastroesophageal reflux disease)    • Kidney stones    • Meniere's disease    • MRSA infection     right ing hernia   • PAD (peripheral artery disease) (CMS/HCC)      Past Surgical History:   Procedure Laterality Date   • APPENDECTOMY     • CARDIAC CATHETERIZATION     • CARDIAC SURGERY  2016    CABG   • CHEST TUBE INSERTION     • CORONARY ARTERY BYPASS GRAFT     • GALLBLADDER SURGERY     • INCISION AND DRAINAGE ABSCESS     • INGUINAL HERNIA REPAIR Bilateral    • LUMBAR LAMINECTOMY     • STERNAL REWIRING N/A 2017    Procedure: REPAIR STERNAL DEHISCENCE WITH STERNAL DEBRIDEMENT  Need plate and screws;  Surgeon: Homero Sutton MD;  Location: [facility];  Service:    • UMBILICAL HERNIA REPAIR       No family history on file.  Social History     Tobacco Use   • Smoking status: Current Every Day Smoker     Packs/day: 0.50     Years: 40.00     Pack years: 20.00     Types: Cigarettes     Last attempt to quit: 1/3/2017     Years since quittin.2   • Smokeless tobacco: Never Used   Substance Use Topics   • Alcohol use: No   • Drug use: No       ALLERGIES:  Allergies   Allergen Reactions   • Amiodarone Anaphylaxis   • Contrast Dye Anaphylaxis   • Iodine Anaphylaxis   • Multivitamins Anaphylaxis     Oyster calcium   • Pepcid [Famotidine] Shortness Of Breath     Pt has taken before with adverse reaction  "  • Proton Pump Inhibitors Shortness Of Breath   • Shellfish-Derived Products Anaphylaxis   • Dexamethasone Other (See Comments)     hiccups   • Livalo [Pitavastatin] Swelling   • Plavix [Clopidogrel Bisulfate] Other (See Comments)     Excessive bleeding   • Statins Swelling   • Tetracyclines & Related Other (See Comments)     \"skin falls off\"   • Penicillins Rash         MEDICATIONS:    Current Outpatient Medications:   •  acetaminophen (TYLENOL) 325 MG tablet, Take 2 tablets by mouth Every 6 (Six) Hours As Needed for Mild Pain ., Disp: , Rfl:   •  albuterol sulfate  (90 Base) MCG/ACT inhaler, Inhale 2 puffs Every 4 (Four) Hours As Needed for Wheezing., Disp: , Rfl:   •  amLODIPine (Norvasc) 5 MG tablet, Take 1 tablet by mouth Daily., Disp: 90 tablet, Rfl: 3  •  aspirin 325 MG tablet, Take 325 mg by mouth Every 12 (Twelve) Hours., Disp: , Rfl:   •  Biotin 5000 MCG tablet, Take 5,000 mcg by mouth Daily. 2 tablets, Disp: , Rfl:   •  CARTIA  MG 24 hr capsule, TAKE 1 CAPSULE BY MOUTH EVERY NIGHT, Disp: 90 capsule, Rfl: 0  •  fluticasone-salmeterol (Wixela Inhub) 250-50 MCG/DOSE DISKUS, Inhale 2 (Two) Times a Day., Disp: , Rfl:   •  insulin NPH (HUMULIN N) 100 UNIT/ML injection, Inject 42 Units under the skin 2 (Two) Times a Day., Disp: , Rfl:   •  NOVOLIN R 100 UNIT/ML injection, Inject 22 Units as directed 2 (Two) Times a Day., Disp: , Rfl:   •  oxyCODONE-acetaminophen (PERCOCET)  MG per tablet, Take 1 tablet by mouth Every 6 (Six) Hours As Needed for Moderate Pain ., Disp: 15 tablet, Rfl: 0  •  tadalafil (ADCIRCA) 20 MG tablet tablet, Take 5 mg by mouth Daily. 1 tablet daily for BPH, Disp: , Rfl:   •  tiotropium (SPIRIVA) 18 MCG per inhalation capsule, Place 1 capsule into inhaler and inhale Daily., Disp: , Rfl:   •  diphenhydrAMINE (BENADRYL) 50 MG capsule, Take one dose 730 am on 4-1-21, Disp: 1 capsule, Rfl: 0  •  predniSONE (DELTASONE) 50 MG tablet, Take one dose with dinner 3/31/21 Take one " "dose qhs 3/31/21 Take one dose at 730 am on 4-1-21, Disp: 3 tablet, Rfl: 0    Review of Systems   Review of Systems   Constitutional: Negative for malaise/fatigue and weight loss.   Cardiovascular: Positive for chest pain, claudication and dyspnea on exertion.   Respiratory: Negative for cough and shortness of breath.    Skin: Negative for color change and poor wound healing.   Musculoskeletal: Positive for back pain and muscle weakness.   Neurological: Negative for dizziness, numbness and weakness.       Physical Exam   Vitals:    03/22/21 1414   BP: 150/66   BP Location: Left arm   Patient Position: Sitting   Cuff Size: Adult   Pulse: 85   Temp: 97.8 °F (36.6 °C)   TempSrc: Temporal   SpO2: 99%   Weight: 105 kg (231 lb)   Height: 172.7 cm (68\")     Physical Exam  Constitutional:       General: He is not in acute distress.     Appearance: He is not ill-appearing.   HENT:      Right Ear: Hearing normal.      Left Ear: Hearing normal.      Nose: No nasal deformity.      Mouth/Throat:      Dentition: Normal dentition. Does not have dentures.   Cardiovascular:      Rate and Rhythm: Normal rate and regular rhythm.      Pulses:           Carotid pulses are 2+ on the right side and 2+ on the left side.       Radial pulses are 2+ on the right side and 2+ on the left side.        Dorsalis pedis pulses are 1+ on the right side and 1+ on the left side.        Posterior tibial pulses are 1+ on the right side and 1+ on the left side.      Heart sounds: No murmur heard.     Pulmonary:      Effort: Pulmonary effort is normal.      Breath sounds: Normal breath sounds.   Abdominal:      General: There is no distension.      Palpations: Abdomen is soft. There is no mass.      Tenderness: There is no abdominal tenderness.   Musculoskeletal:         General: No deformity.      Comments: Gait normal.    Skin:     General: Skin is warm and dry.      Coloration: Skin is not pale.      Findings: No erythema.      Comments: No venous " staining   Neurological:      Mental Status: He is alert and oriented to person, place, and time.   Psychiatric:         Speech: Speech normal.         Behavior: Behavior is cooperative.         Thought Content: Thought content normal.         Judgment: Judgment normal.         BUN   Date Value Ref Range Status   02/03/2021 28 (H) 8 - 23 mg/dL Final     Creatinine   Date Value Ref Range Status   02/03/2021 1.24 0.76 - 1.27 mg/dL Final     eGFR Non  Amer   Date Value Ref Range Status   02/03/2021 59 (L) >60 mL/min/1.73 Final       ASSESSMENT:  Diagnoses and all orders for this visit:    1. PVD (peripheral vascular disease) (CMS/HCC) (Primary)  -     Case Request; Standing  -     sodium chloride 0.9 % infusion  -     sodium chloride 0.9 % flush 3 mL  -     sodium chloride 0.9 % flush 10 mL  -     Case Request    2. Allergy to iodine  -     Discontinue: predniSONE (DELTASONE) 50 MG tablet; Take one dose with dinner 3/31/21  Take one dose qhs 3/31/21  Take one dose at 730 am on 4-1-21  Dispense: 3 tablet; Refill: 0  -     diphenhydrAMINE (BENADRYL) 50 MG capsule; Take one dose 730 am on 4-1-21  Dispense: 1 capsule; Refill: 0  -     predniSONE (DELTASONE) 50 MG tablet; Take one dose with dinner 3/31/21 Take one dose qhs 3/31/21 Take one dose at 730 am on 4-1-21  Dispense: 3 tablet; Refill: 0    3. Dyslipidemia    4. Essential hypertension    5. S/P CABG (coronary artery bypass graft)    6. Type 2 diabetes mellitus with other circulatory complication, with long-term current use of insulin (CMS/HCC)    7. Chronic kidney disease, stage 2 (mild)    8. Mixed hyperlipidemia    9. Class 2 severe obesity due to excess calories with serious comorbidity and body mass index (BMI) of 35.0 to 35.9 in adult (CMS/[facility])    Other orders  -     Provide NPO Instructions to Patient; Future  -     Chlorhexidine Skin Prep; Future  -     Obtain informed consent; Standing  -     Clip bilateral groins; Standing  -     Insert Peripheral IV;  Standing  -     Saline Lock & Maintain IV Access; Standing    PLAN:  Detailed discussion with Paddy Brantley regarding situation, options and plans.  Severe lifestyle limiting claudication and functional limitation.  Noninvasive studies indicate severe peripheral vascular disease.  Requires additional urgent evaluation with arteriography to evaluate options for improving blood flow to feet, healing wounds and avoiding limb loss.    Risks, including but not limited to:  pain, infection, bleeding, renal failure (dialysis), nerve or blood vessel injury, need for emergent open operation to restore blood flow.  Benefits: relief of symptoms, reduction in risk of limb loss, improved wound healing.  Options:  Medical therapy, alternative imaging discussed.  Understands and wishes to proceed with plan.    Abdominal Aortogram, bilateral lower extremity runoff, possible balloon angioplasty, thrombolysis, atherectomy or stent.  Local/IV sedation.  SDS.  4/1/2021    Return after above studies complete  Obesity Class  2. Increased risk cardiovascular events, sleep and breathing disorders, joint issues, type 2 diabetes mellitus. Options for weight management, heart healthy diet, exercise programs, and associated health risks of obesity discussed.  Smoking cessation advised and assistance options offered including behavioral counseling ([facility] Cessation Classes), Nicotine replacement therapy (patches or gum), pharmacologic therapy (Chantix, Wellbutrin). patient understands that continued use of tobacco products increases risk of heart disease, stroke, cancer; counseling for 3-5min.    Recommended regular physical activity, progressive walking program.  Continue current medications as directed.  Advance Care Planning   ACP discussion was declined by the patient. Patient does not have an advance directive, declines further assistance.      Thank you for the opportunity to participate in this patient's  care.    Copy to primary care provider.

## 2021-03-29 ENCOUNTER — LAB (OUTPATIENT)
Dept: LAB | Facility: HOSPITAL | Age: 61
End: 2021-03-29

## 2021-03-29 DIAGNOSIS — Z01.818 PRE-OP TESTING: ICD-10-CM

## 2021-03-29 LAB — SARS-COV-2 N GENE RESP QL NAA+PROBE: NOT DETECTED

## 2021-03-29 PROCEDURE — 87635 SARS-COV-2 COVID-19 AMP PRB: CPT

## 2021-03-29 PROCEDURE — C9803 HOPD COVID-19 SPEC COLLECT: HCPCS

## 2021-04-01 ENCOUNTER — APPOINTMENT (OUTPATIENT)
Dept: ULTRASOUND IMAGING | Facility: HOSPITAL | Age: 61
End: 2021-04-01

## 2021-04-01 ENCOUNTER — HOSPITAL ENCOUNTER (OUTPATIENT)
Facility: HOSPITAL | Age: 61
Setting detail: HOSPITAL OUTPATIENT SURGERY
Discharge: HOME | End: 2021-04-01
Attending: THORACIC SURGERY (CARDIOTHORACIC VASCULAR SURGERY) | Admitting: THORACIC SURGERY (CARDIOTHORACIC VASCULAR SURGERY)

## 2021-04-01 VITALS
TEMPERATURE: 98 F | SYSTOLIC BLOOD PRESSURE: 172 MMHG | WEIGHT: 228.4 LBS | HEIGHT: 68 IN | DIASTOLIC BLOOD PRESSURE: 79 MMHG | HEART RATE: 91 BPM | OXYGEN SATURATION: 97 % | BODY MASS INDEX: 34.62 KG/M2 | RESPIRATION RATE: 20 BRPM

## 2021-04-01 DIAGNOSIS — I73.9 PVD (PERIPHERAL VASCULAR DISEASE) (HCC): ICD-10-CM

## 2021-04-01 PROCEDURE — C1894 INTRO/SHEATH, NON-LASER: HCPCS | Performed by: THORACIC SURGERY (CARDIOTHORACIC VASCULAR SURGERY)

## 2021-04-01 PROCEDURE — C2623 CATH, TRANSLUMIN, DRUG-COAT: HCPCS | Performed by: THORACIC SURGERY (CARDIOTHORACIC VASCULAR SURGERY)

## 2021-04-01 PROCEDURE — C1874 STENT, COATED/COV W/DEL SYS: HCPCS | Performed by: THORACIC SURGERY (CARDIOTHORACIC VASCULAR SURGERY)

## 2021-04-01 PROCEDURE — 75716 ARTERY X-RAYS ARMS/LEGS: CPT | Performed by: THORACIC SURGERY (CARDIOTHORACIC VASCULAR SURGERY)

## 2021-04-01 PROCEDURE — 0 IODIXANOL PER 1 ML: Performed by: THORACIC SURGERY (CARDIOTHORACIC VASCULAR SURGERY)

## 2021-04-01 PROCEDURE — 75625 CONTRAST EXAM ABDOMINL AORTA: CPT | Performed by: THORACIC SURGERY (CARDIOTHORACIC VASCULAR SURGERY)

## 2021-04-01 PROCEDURE — 37224 PR REVSC OPN/PRG FEM/POP W/ANGIOPLASTY UNI: CPT | Performed by: THORACIC SURGERY (CARDIOTHORACIC VASCULAR SURGERY)

## 2021-04-01 PROCEDURE — C1769 GUIDE WIRE: HCPCS | Performed by: THORACIC SURGERY (CARDIOTHORACIC VASCULAR SURGERY)

## 2021-04-01 PROCEDURE — 75710 ARTERY X-RAYS ARM/LEG: CPT | Performed by: THORACIC SURGERY (CARDIOTHORACIC VASCULAR SURGERY)

## 2021-04-01 PROCEDURE — C1887 CATHETER, GUIDING: HCPCS | Performed by: THORACIC SURGERY (CARDIOTHORACIC VASCULAR SURGERY)

## 2021-04-01 PROCEDURE — C1760 CLOSURE DEV, VASC: HCPCS | Performed by: THORACIC SURGERY (CARDIOTHORACIC VASCULAR SURGERY)

## 2021-04-01 PROCEDURE — C1725 CATH, TRANSLUMIN NON-LASER: HCPCS | Performed by: THORACIC SURGERY (CARDIOTHORACIC VASCULAR SURGERY)

## 2021-04-01 PROCEDURE — 37221 PR REVSC OPN/PRQ ILIAC ART W/STNT PLMT & ANGIOPLSTY: CPT | Performed by: THORACIC SURGERY (CARDIOTHORACIC VASCULAR SURGERY)

## 2021-04-01 PROCEDURE — 25010000002 MIDAZOLAM PER 1 MG: Performed by: THORACIC SURGERY (CARDIOTHORACIC VASCULAR SURGERY)

## 2021-04-01 PROCEDURE — 25010000002 HEPARIN (PORCINE) PER 1000 UNITS: Performed by: THORACIC SURGERY (CARDIOTHORACIC VASCULAR SURGERY)

## 2021-04-01 PROCEDURE — 76937 US GUIDE VASCULAR ACCESS: CPT

## 2021-04-01 PROCEDURE — 25010000002 FENTANYL CITRATE (PF) 100 MCG/2ML SOLUTION: Performed by: THORACIC SURGERY (CARDIOTHORACIC VASCULAR SURGERY)

## 2021-04-01 DEVICE — IMPLANTABLE DEVICE: Type: IMPLANTABLE DEVICE | Status: FUNCTIONAL

## 2021-04-01 RX ORDER — ACETAMINOPHEN 325 MG/1
650 TABLET ORAL EVERY 4 HOURS PRN
Status: DISCONTINUED | OUTPATIENT
Start: 2021-04-01 | End: 2021-04-01 | Stop reason: HOSPADM

## 2021-04-01 RX ORDER — IODIXANOL 320 MG/ML
INJECTION, SOLUTION INTRAVASCULAR AS NEEDED
Status: DISCONTINUED | OUTPATIENT
Start: 2021-04-01 | End: 2021-04-01 | Stop reason: HOSPADM

## 2021-04-01 RX ORDER — CLOPIDOGREL BISULFATE 75 MG/1
150 TABLET ORAL ONCE
Status: COMPLETED | OUTPATIENT
Start: 2021-04-01 | End: 2021-04-01

## 2021-04-01 RX ORDER — MIDAZOLAM HYDROCHLORIDE 1 MG/ML
INJECTION INTRAMUSCULAR; INTRAVENOUS AS NEEDED
Status: DISCONTINUED | OUTPATIENT
Start: 2021-04-01 | End: 2021-04-01 | Stop reason: HOSPADM

## 2021-04-01 RX ORDER — FENTANYL CITRATE 50 UG/ML
INJECTION, SOLUTION INTRAMUSCULAR; INTRAVENOUS
Status: DISCONTINUED
Start: 2021-04-01 | End: 2021-04-01 | Stop reason: HOSPADM

## 2021-04-01 RX ORDER — PREDNISONE 1 MG/1
TABLET ORAL
Qty: 21 TABLET | Refills: 0 | Status: SHIPPED | OUTPATIENT
Start: 2021-04-01 | End: 2021-04-12

## 2021-04-01 RX ORDER — FENTANYL CITRATE 50 UG/ML
INJECTION, SOLUTION INTRAMUSCULAR; INTRAVENOUS AS NEEDED
Status: DISCONTINUED | OUTPATIENT
Start: 2021-04-01 | End: 2021-04-01 | Stop reason: HOSPADM

## 2021-04-01 RX ORDER — SODIUM CHLORIDE 9 MG/ML
100 INJECTION, SOLUTION INTRAVENOUS CONTINUOUS
Status: DISCONTINUED | OUTPATIENT
Start: 2021-04-01 | End: 2021-04-01 | Stop reason: HOSPADM

## 2021-04-01 RX ORDER — SODIUM CHLORIDE 0.9 % (FLUSH) 0.9 %
10 SYRINGE (ML) INJECTION AS NEEDED
Status: DISCONTINUED | OUTPATIENT
Start: 2021-04-01 | End: 2021-04-01 | Stop reason: HOSPADM

## 2021-04-01 RX ORDER — HEPARIN SODIUM 1000 [USP'U]/ML
INJECTION, SOLUTION INTRAVENOUS; SUBCUTANEOUS AS NEEDED
Status: DISCONTINUED | OUTPATIENT
Start: 2021-04-01 | End: 2021-04-01 | Stop reason: HOSPADM

## 2021-04-01 RX ORDER — HEPARIN SODIUM 1000 [USP'U]/ML
INJECTION, SOLUTION INTRAVENOUS; SUBCUTANEOUS
Status: DISCONTINUED
Start: 2021-04-01 | End: 2021-04-01 | Stop reason: HOSPADM

## 2021-04-01 RX ORDER — SODIUM CHLORIDE 0.9 % (FLUSH) 0.9 %
3 SYRINGE (ML) INJECTION EVERY 12 HOURS SCHEDULED
Status: DISCONTINUED | OUTPATIENT
Start: 2021-04-01 | End: 2021-04-01 | Stop reason: HOSPADM

## 2021-04-01 RX ORDER — MIDAZOLAM HYDROCHLORIDE 1 MG/ML
INJECTION INTRAMUSCULAR; INTRAVENOUS
Status: DISCONTINUED
Start: 2021-04-01 | End: 2021-04-01 | Stop reason: HOSPADM

## 2021-04-01 RX ORDER — LIDOCAINE HYDROCHLORIDE 20 MG/ML
INJECTION, SOLUTION INFILTRATION; PERINEURAL
Status: COMPLETED
Start: 2021-04-01 | End: 2021-04-01

## 2021-04-01 RX ORDER — CLOPIDOGREL BISULFATE 75 MG/1
75 TABLET ORAL DAILY
Qty: 30 TABLET | Refills: 11 | Status: SHIPPED | OUTPATIENT
Start: 2021-04-01 | End: 2021-04-12 | Stop reason: SDUPTHER

## 2021-04-01 RX ADMIN — LIDOCAINE HYDROCHLORIDE: 20 INJECTION, SOLUTION INFILTRATION; PERINEURAL at 09:32

## 2021-04-01 RX ADMIN — CLOPIDOGREL BISULFATE 150 MG: 75 TABLET ORAL at 10:35

## 2021-04-12 ENCOUNTER — OFFICE VISIT (OUTPATIENT)
Dept: CARDIAC SURGERY | Facility: CLINIC | Age: 61
End: 2021-04-12

## 2021-04-12 VITALS
HEIGHT: 68 IN | HEART RATE: 80 BPM | OXYGEN SATURATION: 98 % | BODY MASS INDEX: 35.49 KG/M2 | WEIGHT: 234.2 LBS | DIASTOLIC BLOOD PRESSURE: 84 MMHG | TEMPERATURE: 98.9 F | SYSTOLIC BLOOD PRESSURE: 158 MMHG

## 2021-04-12 DIAGNOSIS — E78.2 MIXED HYPERLIPIDEMIA: ICD-10-CM

## 2021-04-12 DIAGNOSIS — Z72.0 TOBACCO ABUSE: Primary | ICD-10-CM

## 2021-04-12 DIAGNOSIS — I73.9 PVD (PERIPHERAL VASCULAR DISEASE) (HCC): ICD-10-CM

## 2021-04-12 PROCEDURE — 99214 OFFICE O/P EST MOD 30 MIN: CPT | Performed by: NURSE PRACTITIONER

## 2021-04-12 RX ORDER — CLOPIDOGREL BISULFATE 75 MG/1
75 TABLET ORAL DAILY
Qty: 30 TABLET | Refills: 11 | Status: SHIPPED | OUTPATIENT
Start: 2021-04-12 | End: 2022-04-12

## 2021-04-12 NOTE — PATIENT INSTRUCTIONS
The results of your vascular studies of your right leg shows mild disease with good  circulation, in the left leg shows milddisease with good circulation.      If signs and symptoms of ischemia should occur including but not limited to pale/blue discoloration of limb, increasing pain with ambulation or at rest, or a non-healing wound. Patient is to notify [facility] and Vascular center for immediate evaluation.    Return in 6 weeks

## 2021-04-13 NOTE — PROGRESS NOTES
4/13/2021    Paddy Brantley  1960    Chief Complaint:    Chief Complaint   Patient presents with   • Peripheral Vascular Disease     f/u arteriogram 4/1/21       HPI:      PCP: Dr Iniguez   Cardiology: Dr. Resendez     60y.o. male with HTN(uncontrolled, increased risk stroke, rupture), Diabetes Mellitus(stable, increased risk cardiovascular events), Obesity(uncontrolled, increased risk cardiovascular events), Smoker(uncontrolled, increased risk cardiovascular events), COPD(stable, increased risk pulmonary complications) and Chronic Kidney Disease(stable, increased risk renal failure) CAD(stable, increase risk cardiovascular events).  smokes 1 PPD. moderate leg pain walking 50ft x4yrs, worse past 6 months. Worse walking incline. 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  New claudication earlier this year, lifestyle limiting, underwent arteriogram returns today in follow up with DAISHA.   No other associated signs, symptoms or modifying factors.     11/2016: Carotid duplex: BICA <50%     12/2020 DAISHA:  RIGHT .71 biphasic. , LEFT .59 biphasic.  2/2021 CTA Aorta runoff:  RIGHT SFA 80%, 3v runoff.  LEFT common iliac 90/70%, 3v runoff.  3/2021 Aortagram: Bilateral PEGGY stent (kissing), R SFA PTA  4/2021 DAISHA: Right 0.87 triphasic.  Left 0.94 triphasic.       11/2016 echocardiogram: EF 50-55%, mild MAC, trace MR, diastolic dysfunction  11/2016 Cardiac Catheterization: 70% LAD, 70% circumflex, 80% RCA, 70% diagonal  11/2016 CABG x4 LIMA-LAD, SVG-OM, SVG-DX, SVG-PDA, EVH  12/2017 Sternal revision and Repair with Sternal Plates.   3/2019 CXR:  Broken 4th place distal sternum  7/2019 CT Chest:  First 3 sternal plates intact.  Lower sternal separation with broken plate.  No change or migration of hardware.  10/2020 Stress Test:  EF 65%, no ischemia.  Low risk study.    The following portions of the patient's history  were reviewed and updated as appropriate: allergies, current medications, past family history, past medical history, past social history, past surgical history and problem list.  Recent images independently reviewed.  Available laboratory values reviewed.    PMH:  Past Medical History:   Diagnosis Date   • Accident caused by hypodermic needle    • Guerra esophagus    • Contact with and (suspected) exposure to potentially hazardous body fluids    • Diabetes mellitus (CMS/HCC)    • GERD (gastroesophageal reflux disease)    • Kidney stones    • Meniere's disease    • MRSA infection     right ing hernia   • PAD (peripheral artery disease) (CMS/HCC)      Past Surgical History:   Procedure Laterality Date   • APPENDECTOMY     • ARTERIOGRAM N/A 2021    Procedure: lower extremity Arteriogram possible angioplasty stent atherectomy thrombolysis;  Surgeon: Homero Sutton MD;  Location: [facility];  Service: Interventional Radiology;  Laterality: N/A;   • CARDIAC CATHETERIZATION     • CARDIAC SURGERY  2016    CABG   • CHEST TUBE INSERTION     • CORONARY ARTERY BYPASS GRAFT     • GALLBLADDER SURGERY     • INCISION AND DRAINAGE ABSCESS     • INGUINAL HERNIA REPAIR Bilateral    • LUMBAR LAMINECTOMY     • STERNAL REWIRING N/A 2017    Procedure: REPAIR STERNAL DEHISCENCE WITH STERNAL DEBRIDEMENT  Need plate and screws;  Surgeon: Homero Sutton MD;  Location: [facility] OR;  Service:    • UMBILICAL HERNIA REPAIR       History reviewed. No pertinent family history.  Social History     Tobacco Use   • Smoking status: Current Every Day Smoker     Packs/day: 1.00     Years: 40.00     Pack years: 40.00     Types: Cigarettes     Last attempt to quit: 1/3/2017     Years since quittin.2   • Smokeless tobacco: Never Used   Vaping Use   • Vaping Use: Never used   Substance Use Topics   • Alcohol use: No   • Drug use: No       ALLERGIES:  Allergies   Allergen Reactions   • Amiodarone  "Anaphylaxis   • Contrast Dye Anaphylaxis   • Iodine Anaphylaxis   • Multivitamins Anaphylaxis     Oyster calcium   • Pepcid [Famotidine] Shortness Of Breath     Pt has taken before with adverse reaction   • Proton Pump Inhibitors Shortness Of Breath   • Shellfish-Derived Products Anaphylaxis   • Dexamethasone Other (See Comments)     hiccups   • Livalo [Pitavastatin] Swelling   • Statins Swelling   • Tetracyclines & Related Other (See Comments)     \"skin falls off\"   • Penicillins Rash         MEDICATIONS:    Current Outpatient Medications:   •  albuterol sulfate  (90 Base) MCG/ACT inhaler, Inhale 2 puffs Every 4 (Four) Hours As Needed for Wheezing., Disp: , Rfl:   •  amLODIPine (Norvasc) 5 MG tablet, Take 1 tablet by mouth Daily. (Patient taking differently: Take 5 mg by mouth Daily. Takes 1/2 tablet in the AM and 1/2 tablet in PM), Disp: 90 tablet, Rfl: 3  •  aspirin 325 MG tablet, Take 325 mg by mouth Every 12 (Twelve) Hours., Disp: , Rfl:   •  Biotin 5000 MCG tablet, Take 5,000 mcg by mouth Daily. 2 tablets, Disp: , Rfl:   •  CARTIA  MG 24 hr capsule, TAKE 1 CAPSULE BY MOUTH EVERY NIGHT, Disp: 90 capsule, Rfl: 0  •  clopidogrel (Plavix) 75 MG tablet, Take 1 tablet by mouth Daily., Disp: 30 tablet, Rfl: 11  •  fluticasone-salmeterol (Wixela Inhub) 250-50 MCG/DOSE DISKUS, Inhale 2 (Two) Times a Day., Disp: , Rfl:   •  insulin NPH (HUMULIN N) 100 UNIT/ML injection, Inject 42 Units under the skin 2 (Two) Times a Day., Disp: , Rfl:   •  NOVOLIN R 100 UNIT/ML injection, Inject 22 Units as directed 2 (Two) Times a Day., Disp: , Rfl:   •  tadalafil (ADCIRCA) 20 MG tablet tablet, Take 5 mg by mouth Daily. 1 tablet daily for BPH, Disp: , Rfl:   •  tiotropium (SPIRIVA) 18 MCG per inhalation capsule, Place 1 capsule into inhaler and inhale Daily., Disp: , Rfl:     Review of Systems   Review of Systems   Constitutional: Negative for malaise/fatigue and weight loss.   Cardiovascular: Positive for chest pain, " "claudication and dyspnea on exertion.   Respiratory: Negative for cough and shortness of breath.    Skin: Negative for color change and poor wound healing.   Musculoskeletal: Positive for back pain and muscle weakness.   Neurological: Negative for dizziness, numbness and weakness.       Physical Exam   Vitals:    04/12/21 1543   BP: 158/84   BP Location: Left arm   Pulse: 80   Temp: 98.9 °F (37.2 °C)   TempSrc: Infrared   SpO2: 98%   Weight: 106 kg (234 lb 3.2 oz)   Height: 172.7 cm (68\")     Physical Exam  Constitutional:       General: He is not in acute distress.     Appearance: He is not ill-appearing.   HENT:      Right Ear: Hearing normal.      Left Ear: Hearing normal.      Nose: No nasal deformity.      Mouth/Throat:      Dentition: Normal dentition. Does not have dentures.   Cardiovascular:      Rate and Rhythm: Normal rate and regular rhythm.      Pulses:           Carotid pulses are 2+ on the right side and 2+ on the left side.       Radial pulses are 2+ on the right side and 2+ on the left side.        Dorsalis pedis pulses are 1+ on the right side and 1+ on the left side.        Posterior tibial pulses are 1+ on the right side and 1+ on the left side.      Heart sounds: No murmur heard.     Pulmonary:      Effort: Pulmonary effort is normal.      Breath sounds: Normal breath sounds.   Abdominal:      General: There is no distension.      Palpations: Abdomen is soft. There is no mass.      Tenderness: There is no abdominal tenderness.   Musculoskeletal:         General: No deformity.      Comments: Gait normal.    Skin:     General: Skin is warm and dry.      Coloration: Skin is not pale.      Findings: No erythema.      Comments: No venous staining   Neurological:      Mental Status: He is alert and oriented to person, place, and time.   Psychiatric:         Speech: Speech normal.         Behavior: Behavior is cooperative.         Thought Content: Thought content normal.         Judgment: Judgment " normal.         BUN   Date Value Ref Range Status   02/03/2021 28 (H) 8 - 23 mg/dL Final     Creatinine   Date Value Ref Range Status   02/03/2021 1.24 0.76 - 1.27 mg/dL Final     eGFR Non  Amer   Date Value Ref Range Status   02/03/2021 59 (L) >60 mL/min/1.73 Final       ASSESSMENT/PLAN:  4/2021 DAISHA: Right 0.87 triphasic.  Left 0.94 triphasic.    1. Tobacco abuse  Smoking cessation assistance options offered including behavioral counseling (Smoking Cessation Classes), Nicotine replacement therapy (patches or gum), pharmacologic therapy (Chantix, Wellbutrin). Understands tobacco increases risk of expanding AAA, MI, CVA, PAD, carcinoma. Discussion and question answer period 5-7 minutes.    2. PVD (peripheral vascular disease) (CMS/HCC)  Improved resting perfusion post bilateral PEGGY intervention  Long term DAPT, recommend smoking cessation  Repeat DAISHA and stent ultrasound in 6 weeks    - Doppler Ankle Brachial Index Single Level CAR; Future  - Duplex Aorta IVC Iliac Graft Limited CAR; Future    3. Mixed hyperlipidemia  Lipid-lowering therapy has been proven beneficial in patients with cardio-vascular disease. Current guidelines recommend statin treatment for all patients with PAD,CAD and carotid stenosis. Statins are beneficial in preventing cardiovascular events, increasing functional capacity and lower the risk of adverse limb loss in PAD. Statins decrease the progression of plaque formation and may improve peripheral vessel lining, and aid in reversing atherosclerosis.        This document has been electronically signed by Alexander Thompson [id number]-BC @  On April 13, 2021 09:40 CDT          Thank you for the opportunity to participate in this patient's care.    Copy to primary care provider.

## 2021-06-02 ENCOUNTER — OFFICE VISIT (OUTPATIENT)
Dept: CARDIAC SURGERY | Facility: CLINIC | Age: 61
End: 2021-06-02

## 2021-06-02 VITALS
TEMPERATURE: 98 F | HEIGHT: 68 IN | OXYGEN SATURATION: 99 % | DIASTOLIC BLOOD PRESSURE: 80 MMHG | HEART RATE: 75 BPM | WEIGHT: 229 LBS | BODY MASS INDEX: 34.71 KG/M2 | SYSTOLIC BLOOD PRESSURE: 154 MMHG

## 2021-06-02 DIAGNOSIS — F17.200 TOBACCO DEPENDENCE: ICD-10-CM

## 2021-06-02 DIAGNOSIS — I73.9 PVD (PERIPHERAL VASCULAR DISEASE) (HCC): Primary | ICD-10-CM

## 2021-06-02 DIAGNOSIS — N18.2 CHRONIC KIDNEY DISEASE, STAGE 2 (MILD): ICD-10-CM

## 2021-06-02 DIAGNOSIS — I10 ESSENTIAL HYPERTENSION: ICD-10-CM

## 2021-06-02 DIAGNOSIS — E78.2 MIXED HYPERLIPIDEMIA: ICD-10-CM

## 2021-06-02 PROCEDURE — 99214 OFFICE O/P EST MOD 30 MIN: CPT | Performed by: NURSE PRACTITIONER

## 2021-06-02 NOTE — PATIENT INSTRUCTIONS
Normal  Arterial Flow bilateral Lower Extremity improved  Medical Management: ASA,PLAVIX  Intolerant to Statin therapy-Heart Healthy Diet  If signs and symptoms of ischemia should occur including but not limited to pale/blue discoloration of limb, increasing pain with ambulation or at rest, or a non-healing wound. Patient is to notify Heart and Vascular center for immediate evaluation.   Return 3 mos- DAISHA    Smoking cessation advised.  Tobacco increases risk of expanding AAA, MI, CVA, PAD, carcinoma.     Your BMI is 34 and falls within the overweight range. BMI is strongly correlated with increased health risks. Review options for weight management, heart healthy diet, and exercise programs.

## 2021-06-07 NOTE — PROGRESS NOTES
6/7/2021    Paddy Brantley  1960    Chief Complaint:    Chief Complaint   Patient presents with   • Peripheral Vascular Disease       HPI:      PCP: Dr Iniguez   Cardiology: Dr. Resendez     60y.o. male with HTN(uncontrolled, increased risk stroke, rupture), Diabetes Mellitus(stable, increased risk cardiovascular events), Obesity(uncontrolled, increased risk cardiovascular events), Smoker(uncontrolled, increased risk cardiovascular events), COPD(stable, increased risk pulmonary complications) and Chronic Kidney Disease(stable, increased risk renal failure) CAD(stable, increase risk cardiovascular events).  smokes 1 PPD. moderate leg pain walking 50ft x4yrs, worse past 6 months. Worse walking incline. 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  New claudication earlier this year, lifestyle limiting, underwent arteriogram returns today in follow up with DAISHA.   No other associated signs, symptoms or modifying factors.     11/2016: Carotid duplex: BICA <50%     12/2020 DAISHA:  RIGHT .71 biphasic. , LEFT .59 biphasic.  2/2021 CTA Aorta runoff:  RIGHT SFA 80%, 3v runoff.  LEFT common iliac 90/70%, 3v runoff.  3/2021 Aortagram: Bilateral PEGGY stent (kissing), R SFA PTA  4/2021 DAISHA: Right 0.87 triphasic.  Left 0.94 triphasic.  6/2/21: DAISHA: RIGHT 0.8 Triphasic LEFT 0.93 Triphasic      11/2016 echocardiogram: EF 50-55%, mild MAC, trace MR, diastolic dysfunction  11/2016 Cardiac Catheterization: 70% LAD, 70% circumflex, 80% RCA, 70% diagonal  11/2016 CABG x4 LIMA-LAD, SVG-OM, SVG-DX, SVG-PDA, EVH  12/2017 Sternal revision and Repair with Sternal Plates.   3/2019 CXR:  Broken 4th place distal sternum  7/2019 CT Chest:  First 3 sternal plates intact.  Lower sternal separation with broken plate.  No change or migration of hardware.  10/2020 Stress Test:  EF 65%, no ischemia.  Low risk study.    The following portions of  the patient's history were reviewed and updated as appropriate: allergies, current medications, past family history, past medical history, past social history, past surgical history and problem list.  Recent images independently reviewed.  Available laboratory values reviewed.    PMH:  Past Medical History:   Diagnosis Date   • Accident caused by hypodermic needle    • Guerra esophagus    • Contact with and (suspected) exposure to potentially hazardous body fluids    • Diabetes mellitus (CMS/HCC)    • GERD (gastroesophageal reflux disease)    • Kidney stones    • Meniere's disease    • MRSA infection     right ing hernia   • PAD (peripheral artery disease) (CMS/HCC)      Past Surgical History:   Procedure Laterality Date   • APPENDECTOMY     • ARTERIOGRAM N/A 2021    Procedure: lower extremity Arteriogram possible angioplasty stent atherectomy thrombolysis;  Surgeon: Homreo Sutton MD;  Location: [facility] ANGIO INVASIVE LOCATION;  Service: Interventional Radiology;  Laterality: N/A;   • CARDIAC CATHETERIZATION     • CARDIAC SURGERY  2016    CABG   • CHEST TUBE INSERTION     • CORONARY ARTERY BYPASS GRAFT     • GALLBLADDER SURGERY     • INCISION AND DRAINAGE ABSCESS     • INGUINAL HERNIA REPAIR Bilateral    • LUMBAR LAMINECTOMY     • STERNAL REWIRING N/A 2017    Procedure: REPAIR STERNAL DEHISCENCE WITH STERNAL DEBRIDEMENT  Need plate and screws;  Surgeon: Homero Sutton MD;  Location: [facility] OR;  Service:    • UMBILICAL HERNIA REPAIR       History reviewed. No pertinent family history.  Social History     Tobacco Use   • Smoking status: Current Every Day Smoker     Packs/day: 1.00     Years: 40.00     Pack years: 40.00     Types: Cigarettes     Last attempt to quit: 1/3/2017     Years since quittin.4   • Smokeless tobacco: Never Used   Vaping Use   • Vaping Use: Never used   Substance Use Topics   • Alcohol use: No   • Drug use: No       ALLERGIES:  Allergies   Allergen Reactions  "  • Amiodarone Anaphylaxis   • Contrast Dye Anaphylaxis   • Iodine Anaphylaxis   • Multivitamins Anaphylaxis     Oyster calcium   • Pepcid [Famotidine] Shortness Of Breath     Pt has taken before with adverse reaction   • Proton Pump Inhibitors Shortness Of Breath   • Shellfish-Derived Products Anaphylaxis   • Dexamethasone Other (See Comments)     hiccups   • Livalo [Pitavastatin] Swelling   • Statins Swelling   • Tetracyclines & Related Other (See Comments)     \"skin falls off\"   • Penicillins Rash         MEDICATIONS:    Current Outpatient Medications:   •  albuterol sulfate  (90 Base) MCG/ACT inhaler, Inhale 2 puffs Every 4 (Four) Hours As Needed for Wheezing., Disp: , Rfl:   •  amLODIPine (Norvasc) 5 MG tablet, Take 1 tablet by mouth Daily. (Patient taking differently: Take 5 mg by mouth Daily. Takes 1/2 tablet in the AM and 1/2 tablet in PM), Disp: 90 tablet, Rfl: 3  •  aspirin 325 MG tablet, Take 325 mg by mouth Every 12 (Twelve) Hours., Disp: , Rfl:   •  Biotin 5000 MCG tablet, Take 5,000 mcg by mouth Daily. 2 tablets, Disp: , Rfl:   •  CARTIA  MG 24 hr capsule, TAKE 1 CAPSULE BY MOUTH EVERY NIGHT, Disp: 90 capsule, Rfl: 0  •  clopidogrel (Plavix) 75 MG tablet, Take 1 tablet by mouth Daily., Disp: 30 tablet, Rfl: 11  •  fluticasone-salmeterol (Wixela Inhub) 250-50 MCG/DOSE DISKUS, Inhale 2 (Two) Times a Day., Disp: , Rfl:   •  insulin NPH (HUMULIN N) 100 UNIT/ML injection, Inject 42 Units under the skin 2 (Two) Times a Day., Disp: , Rfl:   •  NOVOLIN R 100 UNIT/ML injection, Inject 22 Units as directed 2 (Two) Times a Day., Disp: , Rfl:   •  tadalafil (ADCIRCA) 20 MG tablet tablet, Take 5 mg by mouth Daily. 1 tablet daily for BPH, Disp: , Rfl:   •  tiotropium (SPIRIVA) 18 MCG per inhalation capsule, Place 1 capsule into inhaler and inhale Daily., Disp: , Rfl:     Review of Systems   Review of Systems   Constitutional: Negative for malaise/fatigue and weight loss.   Cardiovascular: Positive for " "dyspnea on exertion. Negative for chest pain and claudication.   Respiratory: Negative for cough and shortness of breath.    Skin: Negative for color change and poor wound healing.   Musculoskeletal: Positive for back pain and muscle weakness.   Neurological: Negative for dizziness, numbness and weakness.       Physical Exam   Vitals:    06/02/21 0857   BP: 154/80   BP Location: Left arm   Patient Position: Sitting   Cuff Size: Adult   Pulse: 75   Temp: 98 °F (36.7 °C)   TempSrc: Temporal   SpO2: 99%   Weight: 104 kg (229 lb)   Height: 172.7 cm (68\")     Physical Exam  Constitutional:       General: He is not in acute distress.     Appearance: He is not ill-appearing.   HENT:      Right Ear: Hearing normal.      Left Ear: Hearing normal.      Nose: No nasal deformity.      Mouth/Throat:      Dentition: Normal dentition. Does not have dentures.   Cardiovascular:      Rate and Rhythm: Normal rate and regular rhythm.      Pulses:           Carotid pulses are 2+ on the right side and 2+ on the left side.       Radial pulses are 2+ on the right side and 2+ on the left side.        Dorsalis pedis pulses are 2+ on the right side and 2+ on the left side.        Posterior tibial pulses are 2+ on the right side and 2+ on the left side.      Heart sounds: No murmur heard.     Pulmonary:      Effort: Pulmonary effort is normal.      Breath sounds: Normal breath sounds.   Abdominal:      General: There is no distension.      Palpations: Abdomen is soft. There is no mass.      Tenderness: There is no abdominal tenderness.   Musculoskeletal:         General: No deformity.   Skin:     General: Skin is warm and dry.      Coloration: Skin is not pale.      Findings: No erythema.      Comments: No venous staining   Neurological:      Mental Status: He is alert and oriented to person, place, and time.      Gait: Gait normal.   Psychiatric:         Speech: Speech normal.         Behavior: Behavior is cooperative.         Thought " Content: Thought content normal.         Judgment: Judgment normal.         BUN   Date Value Ref Range Status   02/03/2021 28 (H) 8 - 23 mg/dL Final     Creatinine   Date Value Ref Range Status   02/03/2021 1.24 0.76 - 1.27 mg/dL Final     eGFR Non  Amer   Date Value Ref Range Status   02/03/2021 59 (L) >60 mL/min/1.73 Final       ASSESSMENT/PLAN:  Detailed discussion regarding risks, benefits, and treatment plan. Images independently reviewed. Patient understands, agrees, and wishes to proceed with plan.      1. PVD (peripheral vascular disease) (CMS/HCC)  Normal  Arterial Flow bilateral Lower Extremity improved  Medical Management: ASA,PLAVIX  Intolerant to Statin therapy-Heart Healthy Diet  If signs and symptoms of ischemia should occur including but not limited to pale/blue discoloration of limb, increasing pain with ambulation or at rest, or a non-healing wound. Patient is to notify Heart and Vascular center for immediate evaluation.   Return 3 mos- DAISHA  - Doppler Ankle Brachial Index Single Level CAR; Future    2. Mixed hyperlipidemia  Lipid-lowering therapy has been proven beneficial in patients with cardio-vascular disease. Current guidelines recommend statin treatment for all patients with PAD,CAD and carotid stenosis. Statins are beneficial in preventing cardiovascular events, increasing functional capacity and lower the risk of adverse limb loss in PAD. Statins decrease the progression of plaque formation and may improve peripheral vessel lining, and aid in reversing atherosclerosis.       3. Essential hypertension  Controlled.     4. Chronic kidney disease, stage 2 (mild)  Lab Results   Component Value Date    CREATININE 1.24 02/03/2021         5. Tobacco dependence  Smoking cessation assistance options offered including behavioral counseling (Smoking Cessation Classes), Nicotine replacement therapy (patches or gum), pharmacologic therapy (Chantix, Wellbutrin). Understands tobacco increases risk  of expanding AAA, MI, CVA, PAD, carcinoma. Discussion and question answer period 5-7 minutes. Paddy Brantley  reports that he has been smoking cigarettes. He has a 40.00 pack-year smoking history. He has never used smokeless tobacco.. I have educated him on the risk of diseases from using tobacco products such as cancer, COPD and heart disease.     I advised him to quit and he is not willing to quit.    I spent 5 minutes counseling the patient.           Patient's Body mass index is 34.82 kg/m². indicating that he is obese (BMI >30). Obesity-related health conditions include the following: hypertension, coronary heart disease, dyslipidemias and peripheral vascular disease. Obesity is unchanged. BMI is is above average; BMI management plan is completed. We discussed low calorie, low carb based diet program, portion control and increasing exercise..     Time spent 30 minutes in face to face evaluation, reviewing of medical history, tests, and procedures. Independent interpretation of vascular studies, ordering additional tests, documentation, and coordination of care.   Counseling and education with the patient and family regarding treatment options, plan of care, and hoped for outcomes. All questions answered.       Advance Care Planning discussed and  Informational packet given to patient. Advance Care Plan on file: No          This document has been electronically signed by GISSELLE George on June 7, 2021 14:25 CDT

## 2021-07-01 ENCOUNTER — OFFICE VISIT (OUTPATIENT)
Dept: CARDIOLOGY | Facility: CLINIC | Age: 61
End: 2021-07-01

## 2021-07-01 VITALS
OXYGEN SATURATION: 96 % | WEIGHT: 229 LBS | HEART RATE: 63 BPM | HEIGHT: 68 IN | SYSTOLIC BLOOD PRESSURE: 142 MMHG | DIASTOLIC BLOOD PRESSURE: 78 MMHG | TEMPERATURE: 97.5 F | BODY MASS INDEX: 34.71 KG/M2

## 2021-07-01 DIAGNOSIS — I25.118 CORONARY ARTERY DISEASE OF NATIVE ARTERY OF NATIVE HEART WITH STABLE ANGINA PECTORIS (HCC): ICD-10-CM

## 2021-07-01 DIAGNOSIS — I73.9 PVD (PERIPHERAL VASCULAR DISEASE) (HCC): ICD-10-CM

## 2021-07-01 DIAGNOSIS — Z95.1 S/P CABG (CORONARY ARTERY BYPASS GRAFT): Primary | ICD-10-CM

## 2021-07-01 DIAGNOSIS — E78.2 MIXED HYPERLIPIDEMIA: ICD-10-CM

## 2021-07-01 PROCEDURE — 93000 ELECTROCARDIOGRAM COMPLETE: CPT | Performed by: INTERNAL MEDICINE

## 2021-07-01 PROCEDURE — 99214 OFFICE O/P EST MOD 30 MIN: CPT | Performed by: INTERNAL MEDICINE

## 2021-07-01 RX ORDER — RANOLAZINE 500 MG/1
500 TABLET, EXTENDED RELEASE ORAL 2 TIMES DAILY
Qty: 180 TABLET | Refills: 3 | Status: SHIPPED | OUTPATIENT
Start: 2021-07-01

## 2021-07-01 RX ORDER — TADALAFIL 5 MG/1
5 TABLET ORAL NIGHTLY
COMMUNITY
Start: 2021-05-23

## 2021-07-01 RX ORDER — IBUPROFEN 200 MG
TABLET ORAL SEE ADMIN INSTRUCTIONS
COMMUNITY
Start: 2021-05-23

## 2021-07-01 RX ORDER — AMLODIPINE BESYLATE 2.5 MG/1
2.5 TABLET ORAL 2 TIMES DAILY
COMMUNITY
Start: 2021-06-04 | End: 2022-04-21 | Stop reason: HOSPADM

## 2021-07-01 RX ORDER — ROPINIROLE 1 MG/1
1 TABLET, FILM COATED ORAL 3 TIMES DAILY
COMMUNITY
Start: 2021-06-07 | End: 2022-03-22

## 2021-07-01 NOTE — PROGRESS NOTES
Paddy Brantley  60 y.o. male    1. S/P CABG (coronary artery bypass graft)    2. PVD (peripheral vascular disease) (CMS/[facility])    3. Coronary artery disease of native artery of native heart with stable angina pectoris (CMS/[facility])    4. Mixed hyperlipidemia        History of Present Illness  Mr. Brantley is a 58-year-old male with multiple medical issues including coronary artery disease status post CABG, multiple PCI prior to CABG, GERD, diabetes mellitus, chronic pain syndrome, nicotine dependence, BPH, multiple drug allergies.    The patient underwent coronary artery bypass surgery in November 2016 when he received LIMA to LAD, SVG to OM, SVG to diagonal and SVG to PDA.  He developed multiple issues postoperatively including left hemothorax which had to be drained.  In October 2017 he had a fracture/nonunion mid sternal body and underwent sternal revision and repair with sternal plates in December 2017.    He was admitted for evaluation of chest discomfort in October 2020 and cardiac enzymes were negative for myocardial injury.  He underwent the following tests:  Lexiscan Cardiolite stress test in October 2020 showed:  · Findings consistent with an equivocal ECG stress test.  · Left ventricular ejection fraction is normal. (Calculated EF = 65%).  · Myocardial perfusion imaging indicates a normal myocardial perfusion study with no evidence of ischemia.  · Impressions are consistent with a low risk study.    Patient is evaluated for bilateral lower extremity claudication and following an abnormal DAISHA was referred to Dr. Sutton who performed an abdominal aortogram with runoff followed by the following procedures on 4/1/2021  Aortoiliac arteriogram  RIGHT lower extremity arteriogram   Transcatheter angioplasty RIGHT common iliac artery ([id number] lifestream stent)  Transcatheter angioplasty RIGHT common iliac artery ([id number] lifestream stent)  Vascular ultrasound guidance bilateral  Repair LEFT common femoral artery  "(perclose)  Repair LEFT common femoral artery (perclose)    Following the above procedure his DAISHA had improved and on 6/2/2021 DAISHA on the right side was 0.8 and on the left 0.93.  (Triphasic)    Patient did report intermittent episodes of chest pressure from time to time though he is able to perform his activities of daily living.  He unfortunately continues to smoke.  He does not wish to take statins or PCSK9 inhibitors secondary to side effects.    EKG today showed sinus rhythm with heart rate of 63 bpm.  Small Q waves in the inferior leads.  Nonspecific ST-T changes.  Unchanged from previous EKG.  Baseline artifact      Allergies   Allergen Reactions   • Amiodarone Anaphylaxis   • Contrast Dye Anaphylaxis   • Iodine Anaphylaxis   • Multivitamins Anaphylaxis     Oyster calcium   • Pepcid [Famotidine] Shortness Of Breath     Pt has taken before with adverse reaction   • Proton Pump Inhibitors Shortness Of Breath   • Shellfish-Derived Products Anaphylaxis   • Dexamethasone Other (See Comments)     hiccups   • Livalo [Pitavastatin] Swelling   • Statins Swelling   • Tetracyclines & Related Other (See Comments)     \"skin falls off\"   • Penicillins Rash         Past Medical History:   Diagnosis Date   • Accident caused by hypodermic needle    • Guerra esophagus    • Contact with and (suspected) exposure to potentially hazardous body fluids    • Diabetes mellitus (CMS/HCC)    • GERD (gastroesophageal reflux disease)    • Kidney stones    • Meniere's disease    • MRSA infection     right ing hernia   • PAD (peripheral artery disease) (CMS/HCC)          Past Surgical History:   Procedure Laterality Date   • APPENDECTOMY     • ARTERIOGRAM N/A 4/1/2021    Procedure: lower extremity Arteriogram possible angioplasty stent atherectomy thrombolysis;  Surgeon: Homero Sutton MD;  Location: [facility] ANGIO INVASIVE LOCATION;  Service: Interventional Radiology;  Laterality: N/A;   • CARDIAC CATHETERIZATION     • CARDIAC " SURGERY  2016    CABG   • CHEST TUBE INSERTION     • CORONARY ARTERY BYPASS GRAFT     • GALLBLADDER SURGERY     • INCISION AND DRAINAGE ABSCESS     • INGUINAL HERNIA REPAIR Bilateral    • LUMBAR LAMINECTOMY     • STERNAL REWIRING N/A 2017    Procedure: REPAIR STERNAL DEHISCENCE WITH STERNAL DEBRIDEMENT  Need plate and screws;  Surgeon: Homero Sutton MD;  Location: [facility];  Service:    • UMBILICAL HERNIA REPAIR           History reviewed. No pertinent family history.      Social History     Socioeconomic History   • Marital status: Single     Spouse name: Not on file   • Number of children: Not on file   • Years of education: Not on file   • Highest education level: Not on file   Tobacco Use   • Smoking status: Current Every Day Smoker     Packs/day: 1.00     Years: 40.00     Pack years: 40.00     Types: Cigarettes     Last attempt to quit: 1/3/2017     Years since quittin.4   • Smokeless tobacco: Never Used   Vaping Use   • Vaping Use: Never used   Substance and Sexual Activity   • Alcohol use: No   • Drug use: No   • Sexual activity: Defer         Current Outpatient Medications   Medication Sig Dispense Refill   • albuterol sulfate  (90 Base) MCG/ACT inhaler Inhale 2 puffs Every 4 (Four) Hours As Needed for Wheezing.     • amLODIPine (NORVASC) 2.5 MG tablet Take 2.5 mg by mouth 2 (Two) Times a Day.     • aspirin 325 MG tablet Take 325 mg by mouth Every 12 (Twelve) Hours.     • Biotin 5000 MCG tablet Take 5,000 mcg by mouth Daily. 2 tablets     • CARTIA  MG 24 hr capsule TAKE 1 CAPSULE BY MOUTH EVERY NIGHT 90 capsule 0   • cimetidine (TAGAMET) 200 MG tablet Take 200 mg by mouth 2 (Two) Times a Day.     • clopidogrel (Plavix) 75 MG tablet Take 1 tablet by mouth Daily. 30 tablet 11   • fluticasone-salmeterol (Wixela Inhub) 250-50 MCG/DOSE DISKUS Inhale 2 (Two) Times a Day.     • insulin NPH (HUMULIN N) 100 UNIT/ML injection Inject 42 Units under the skin 2 (Two) Times a Day.    "  • Insulin Syringe 29G X 1/2\" 1 ML misc See Admin Instructions.     • NOVOLIN R 100 UNIT/ML injection Inject 22 Units as directed 2 (Two) Times a Day.     • rOPINIRole (REQUIP) 1 MG tablet Take 1 mg by mouth 3 (Three) Times a Day.     • tadalafil (CIALIS) 5 MG tablet Take 5 mg by mouth Daily.     • tiotropium (SPIRIVA) 18 MCG per inhalation capsule Place 1 capsule into inhaler and inhale Daily.     • amLODIPine (Norvasc) 5 MG tablet Take 1 tablet by mouth Daily. (Patient taking differently: Take 5 mg by mouth Daily. Takes 1/2 tablet in the AM and 1/2 tablet in PM) 90 tablet 3   • tadalafil (ADCIRCA) 20 MG tablet tablet Take 5 mg by mouth Daily. 1 tablet daily for BPH       No current facility-administered medications for this visit.         OBJECTIVE    /78 (BP Location: Left arm, Patient Position: Sitting, Cuff Size: Adult)   Pulse 70   Temp 97.5 °F (36.4 °C)   Ht 172.7 cm (68\")   Wt 104 kg (229 lb)   SpO2 96%   BMI 34.82 kg/m²         Review of Systems: The following systems are reviewed and changes noted as indicated in the history of present illness    Constitutional:  Denies recent weight loss, weight gain, fever or chills     HENT:  Denies any hearing loss, epistaxis, hoarseness, or difficulty speaking.     Eyes: Wears eyeglasses or contact lenses     Respiratory:  Denies dyspnea with exertion,no cough, wheezing, or hemoptysis.     Cardiovascular: Intermittent chest pressure.  No palpitation    Gastrointestinal:  Denies change in bowel habits, dyspepsia, ulcer disease, hematochezia, or melena.     Endocrine: Negative for cold intolerance, heat intolerance, polydipsia, polyphagia and polyuria.     Genitourinary: Negative.      Musculoskeletal: Denies any history of arthritic symptoms or back problems     Neurological:  Denies any history of recurrent headaches, strokes, TIA, or seizure disorder.       Physical Exam: The following systems are reviewed and no changes noted     Constitutional: " Cooperative, alert and oriented, in no acute distress.      HENT:   Head: Normocephalic, normal hair patterns, no masses or tenderness.  Ears, Nose, and Throat: No gross abnormalities. No pallor or cyanosis. Dentition good.   Eyes: EOMS intact, PERRL, conjunctivae and lids unremarkable. Fundoscopic exam and visual fields not performed.   Neck: No palpable masses or adenopathy, no thyromegaly, no JVD, carotid pulses are full and equal bilaterally and without bruit.      Cardiovascular: Regular rhythm, S1 and S2 normal, no S3 or S4. Apical impulse not displaced. No murmurs, gallops, or rubs detected.      Pulmonary/Chest: Chest: normal symmetry, Sternal  tenderness to palpation, normal respiratory excursion, no intercostal retraction, no use of accessory muscles. Pulmonary: Normal breath sounds - no rales or rhonchi.     Abdominal: Abdomen soft, bowel sounds normoactive, no masses, no hepatosplenomegaly, non-tender, no bruits.     Musculoskeletal: No deformities, clubbing, cyanosis, erythema, or edema observed.     Neurological: No gross motor or sensory deficits noted, Cranial nerves 2-12 normal. affect appropriate, oriented to time, person, place.      Skin: Warm and dry to the touch, no apparent skin lesions or masses noted.     Psychiatric: Normal mood and affect. Behavior is normal. Judgment and thought content normal.   Procedures      Lab Results   Component Value Date    WBC 7.33 10/01/2020    HGB 16.5 10/01/2020    HCT 49.9 10/01/2020    MCV 89.1 10/01/2020     10/01/2020     Lab Results   Component Value Date    GLUCOSE 132 (H) 10/01/2020    BUN 28 (H) 02/03/2021    CREATININE 1.24 02/03/2021    EGFRIFNONA 59 (L) 02/03/2021    BCR 21.4 10/01/2020    CO2 25.0 10/01/2020    CALCIUM 9.6 10/01/2020    ALBUMIN 4.30 09/30/2020    AST 12 09/30/2020    ALT 11 09/30/2020     Lab Results   Component Value Date    CHOL 211 (H) 09/30/2020    CHOL 213 (H) 09/30/2020    CHOL 224 (H) 06/07/2019     Lab Results    Component Value Date    TRIG 172 (H) 09/30/2020    TRIG 130 09/30/2020    TRIG 120 06/07/2019     Lab Results   Component Value Date    HDL 44 09/30/2020    HDL 43 09/30/2020    HDL 48 06/07/2019     No components found for: LDLCALC  Lab Results   Component Value Date     (H) 09/30/2020     (H) 09/30/2020     (H) 06/07/2019     No results found for: HDLLDLRATIO  No components found for: CHOLHDL  Lab Results   Component Value Date    HGBA1C 5.60 09/30/2020     Lab Results   Component Value Date    TSH 0.21 (L) 11/18/2016    Y2TILPV 96 (L) 11/18/2016    P4FGKAI 6.3 11/18/2016           ASSESSMENT AND PLAN  Paddy Brantley has multiple medical issues as discussed in detail under history of present illness.  He continues to have intermittent chest pressure which is difficult to assess.  I did discuss both noninvasive and invasive testing but the patient wishes to continue medical therapy for now.  His Lexiscan Cardiolite stress test in October 2020 did not reveal reversible ischemia.   Interestingly, he did report that this is the best he has felt in several years, because of symptomatic improvement in his claudication and improved ambulation.  I have continued antiplatelet therapy with aspirin, antihypertensive therapy with Cartia XT and amlodipine.  I have started him on Ranexa 500 mg twice a day which will hopefully improve his symptoms.    Tobacco cessation was stressed but he does not wish to quit.    Diagnoses and all orders for this visit:    1. S/P CABG (coronary artery bypass graft) (Primary)    2. PVD (peripheral vascular disease) (CMS/[facility])    3. Coronary artery disease of native artery of native heart with stable angina pectoris (CMS/[facility])    4. Mixed hyperlipidemia        Patient's Body mass index is 34.82 kg/m². BMI is above normal parameters. Recommendations include: exercise counseling and nutrition counseling.      Paddy Brantley  reports that he has been smoking cigarettes. He has  a 40.00 pack-year smoking history. He has never used smokeless tobacco.. I have educated him on the risk of diseases from using tobacco products such as cancer, COPD and heart disease.     I advised him to quit and he is not willing to quit.    I spent 3  minutes counseling the patient.       Gordo Resendez MD  7/1/2021  15:54 CDT

## 2021-07-06 LAB
QT INTERVAL: 366 MS
QTC INTERVAL: 374 MS

## 2021-09-07 ENCOUNTER — OFFICE VISIT (OUTPATIENT)
Dept: CARDIAC SURGERY | Facility: CLINIC | Age: 61
End: 2021-09-07

## 2021-09-07 VITALS
WEIGHT: 227.8 LBS | OXYGEN SATURATION: 97 % | BODY MASS INDEX: 34.53 KG/M2 | HEIGHT: 68 IN | HEART RATE: 95 BPM | SYSTOLIC BLOOD PRESSURE: 146 MMHG | DIASTOLIC BLOOD PRESSURE: 84 MMHG | TEMPERATURE: 99.1 F

## 2021-09-07 DIAGNOSIS — I65.23 CAROTID STENOSIS, ASYMPTOMATIC, BILATERAL: ICD-10-CM

## 2021-09-07 DIAGNOSIS — I73.9 PVD (PERIPHERAL VASCULAR DISEASE) (HCC): Primary | ICD-10-CM

## 2021-09-07 DIAGNOSIS — I10 ESSENTIAL HYPERTENSION: ICD-10-CM

## 2021-09-07 DIAGNOSIS — Z72.0 TOBACCO ABUSE: ICD-10-CM

## 2021-09-07 DIAGNOSIS — E78.2 MIXED HYPERLIPIDEMIA: ICD-10-CM

## 2021-09-07 PROCEDURE — 99214 OFFICE O/P EST MOD 30 MIN: CPT | Performed by: NURSE PRACTITIONER

## 2021-09-07 NOTE — PATIENT INSTRUCTIONS
Normal  Arterial Flow bilateral Lower Extremity remained stable  Medical Management: ASA,PLAVIX  If signs and symptoms of ischemia should occur including but not limited to pale/blue discoloration of limb, increasing pain with ambulation or at rest, or a non-healing wound. Patient is to notify [facility] Vascular center for immediate evaluation.   Return 6 mos    Carotid Stenosis  2016 Asymptomatic  If you should experience any neurological symptoms including but not limited to visual or speech disturbances confusion, seizures, or weakness of limbs of one side of your body notify Heart and Vascular center immediately for evaluation or if after hours present to the nearest Emergency Department.    Duplex - 6 mos

## 2021-09-07 NOTE — PROGRESS NOTES
Paddy Barntley  1960    Chief Complaint:    Chief Complaint   Patient presents with   • Peripheral Vascular Disease     3 mo f/u       HPI:      PCP: Dr Iniguez   Cardiology: Dr. Resendez     61y.o. male with HTN(uncontrolled, increased risk stroke, rupture), Diabetes Mellitus(stable, increased risk cardiovascular events), Obesity(uncontrolled, increased risk cardiovascular events), Smoker(uncontrolled, increased risk cardiovascular events), COPD(stable, increased risk pulmonary complications) and Chronic Kidney Disease(stable, increased risk renal failure) CAD(stable, increase risk cardiovascular events).  smokes 1 PPD. moderate leg pain walking 50ft x4yrs, worse past 6 months. Worse walking incline. 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  New claudication earlier this year, lifestyle limiting, underwent arteriogram returns today in follow up with [facility].   No other associated signs, symptoms or modifying factors.     11/2016: Carotid duplex: BICA <50%     12/2020 DAISHA:  RIGHT .71 biphasic. , LEFT .59 biphasic.  2/2021 CTA Aorta runoff:  RIGHT SFA 80%, 3v runoff.  LEFT common iliac 90/70%, 3v runoff.  3/2021 Aortagram: Bilateral PEGGY stent (kissing), R SFA PTA  4/2021 DAISHA: Right 0.87 triphasic.  Left 0.94 triphasic.  6/2/21: DAISHA: RIGHT 0.8 Triphasic LEFT 0.93 Triphasic   9/7/21: DAISHA: RIGHT 0.84 Triphasic LEFT 0.93 Triphasic      11/2016 echocardiogram: EF 50-55%, mild MAC, trace MR, diastolic dysfunction  11/2016 Cardiac Catheterization: 70% LAD, 70% circumflex, 80% RCA, 70% diagonal  11/2016 CABG x4 LIMA-LAD, SVG-OM, SVG-DX, SVG-PDA, EVH  12/2017 Sternal revision and Repair with Sternal Plates.   3/2019 CXR:  Broken 4th place distal sternum  7/2019 CT Chest:  First 3 sternal plates intact.  Lower sternal separation with broken plate.  No change or migration of hardware.  10/2020 Stress Test:  EF 65%,  no ischemia.  Low risk study.    The following portions of the patient's history were reviewed and updated as appropriate: allergies, current medications, past family history, past medical history, past social history, past surgical history and problem list.  Recent images independently reviewed.  Available laboratory values reviewed.    PMH:  Past Medical History:   Diagnosis Date   • Accident caused by hypodermic needle    • Guerra esophagus    • Contact with and (suspected) exposure to potentially hazardous body fluids    • Diabetes mellitus (CMS/HCC)    • GERD (gastroesophageal reflux disease)    • Kidney stones    • Meniere's disease    • MRSA infection     right ing hernia   • PAD (peripheral artery disease) (CMS/HCC)      Past Surgical History:   Procedure Laterality Date   • APPENDECTOMY     • ARTERIOGRAM N/A 2021    Procedure: lower extremity Arteriogram possible angioplasty stent atherectomy thrombolysis;  Surgeon: Homero Sutton MD;  Location: [facility] LOCATION;  Service: Interventional Radiology;  Laterality: N/A;   • CARDIAC CATHETERIZATION     • CARDIAC SURGERY  2016    CABG   • CHEST TUBE INSERTION     • CORONARY ARTERY BYPASS GRAFT     • GALLBLADDER SURGERY     • INCISION AND DRAINAGE ABSCESS     • INGUINAL HERNIA REPAIR Bilateral    • LUMBAR LAMINECTOMY     • STERNAL REWIRING N/A 2017    Procedure: REPAIR STERNAL DEHISCENCE WITH STERNAL DEBRIDEMENT  Need plate and screws;  Surgeon: Homero Sutton MD;  Location: [facility] OR;  Service:    • UMBILICAL HERNIA REPAIR       History reviewed. No pertinent family history.  Social History     Tobacco Use   • Smoking status: Current Every Day Smoker     Packs/day: 1.00     Years: 40.00     Pack years: 40.00     Types: Cigarettes     Last attempt to quit: 1/3/2017     Years since quittin.6   • Smokeless tobacco: Never Used   Vaping Use   • Vaping Use: Never used   Substance Use Topics   • Alcohol use: No   •  "Drug use: No       ALLERGIES:  Allergies   Allergen Reactions   • Amiodarone Anaphylaxis   • Contrast Dye Anaphylaxis   • Iodine Anaphylaxis   • Multivitamins Anaphylaxis     Oyster calcium   • Pepcid [Famotidine] Shortness Of Breath     Pt has taken before with adverse reaction   • Proton Pump Inhibitors Shortness Of Breath   • Shellfish-Derived Products Anaphylaxis   • Dexamethasone Other (See Comments)     hiccups   • Livalo [Pitavastatin] Swelling   • Statins Swelling   • Tetracyclines & Related Other (See Comments)     \"skin falls off\"   • Penicillins Rash         MEDICATIONS:    Current Outpatient Medications:   •  albuterol sulfate  (90 Base) MCG/ACT inhaler, Inhale 2 puffs Every 4 (Four) Hours As Needed for Wheezing., Disp: , Rfl:   •  amLODIPine (NORVASC) 2.5 MG tablet, Take 2.5 mg by mouth 2 (Two) Times a Day., Disp: , Rfl:   •  aspirin 325 MG tablet, Take 325 mg by mouth Every 12 (Twelve) Hours., Disp: , Rfl:   •  Biotin 5000 MCG tablet, Take 5,000 mcg by mouth Daily. 2 tablets, Disp: , Rfl:   •  CARTIA  MG 24 hr capsule, TAKE 1 CAPSULE BY MOUTH EVERY NIGHT, Disp: 90 capsule, Rfl: 0  •  cimetidine (TAGAMET) 200 MG tablet, Take 200 mg by mouth 2 (Two) Times a Day., Disp: , Rfl:   •  clopidogrel (Plavix) 75 MG tablet, Take 1 tablet by mouth Daily., Disp: 30 tablet, Rfl: 11  •  fluticasone-salmeterol (Wixela Inhub) 250-50 MCG/DOSE DISKUS, Inhale 2 (Two) Times a Day., Disp: , Rfl:   •  insulin NPH (HUMULIN N) 100 UNIT/ML injection, Inject 42 Units under the skin 2 (Two) Times a Day., Disp: , Rfl:   •  Insulin Syringe 29G X 1/2\" 1 ML misc, See Admin Instructions., Disp: , Rfl:   •  NOVOLIN R 100 UNIT/ML injection, Inject 22 Units as directed 2 (Two) Times a Day., Disp: , Rfl:   •  ranolazine (Ranexa) 500 MG 12 hr tablet, Take 1 tablet by mouth 2 (Two) Times a Day., Disp: 180 tablet, Rfl: 3  •  rOPINIRole (REQUIP) 1 MG tablet, Take 1 mg by mouth 3 (Three) Times a Day., Disp: , Rfl:   •  tadalafil " "(CIALIS) 5 MG tablet, Take 5 mg by mouth Daily., Disp: , Rfl:   •  tiotropium (SPIRIVA) 18 MCG per inhalation capsule, Place 1 capsule into inhaler and inhale Daily., Disp: , Rfl:     Review of Systems   Review of Systems   Constitutional: Negative for malaise/fatigue and weight loss.   Cardiovascular: Positive for dyspnea on exertion. Negative for chest pain and claudication.   Respiratory: Negative for cough and shortness of breath.    Skin: Negative for color change and poor wound healing.   Musculoskeletal: Positive for back pain and muscle weakness.   Neurological: Negative for dizziness, numbness and weakness.       Physical Exam   Vitals:    09/07/21 1531   BP: 146/84   BP Location: Left arm   Pulse: 95   Temp: 99.1 °F (37.3 °C)   TempSrc: Infrared   SpO2: 97%   Weight: 103 kg (227 lb 12.8 oz)   Height: 172.7 cm (68\")     Physical Exam  Constitutional:       General: He is not in acute distress.     Appearance: He is not ill-appearing.   HENT:      Right Ear: Hearing normal.      Left Ear: Hearing normal.      Nose: No nasal deformity.      Mouth/Throat:      Dentition: Normal dentition. Does not have dentures.   Cardiovascular:      Rate and Rhythm: Normal rate and regular rhythm.      Pulses:           Carotid pulses are 2+ on the right side and 2+ on the left side.       Radial pulses are 2+ on the right side and 2+ on the left side.        Dorsalis pedis pulses are 2+ on the right side and 2+ on the left side.        Posterior tibial pulses are 2+ on the right side and 2+ on the left side.      Heart sounds: No murmur heard.     Pulmonary:      Effort: Pulmonary effort is normal.      Breath sounds: Normal breath sounds.   Abdominal:      General: There is no distension.      Palpations: Abdomen is soft. There is no mass.      Tenderness: There is no abdominal tenderness.   Musculoskeletal:         General: No deformity.   Skin:     General: Skin is warm and dry.      Coloration: Skin is not pale.      " Findings: No erythema.      Comments: No venous staining   Neurological:      Mental Status: He is alert and oriented to person, place, and time.      Gait: Gait normal.   Psychiatric:         Speech: Speech normal.         Behavior: Behavior is cooperative.         Thought Content: Thought content normal.         Judgment: Judgment normal.           ASSESSMENT/PLAN:  Detailed discussion regarding risks, benefits, and treatment plan. Images independently reviewed. Patient understands, agrees, and wishes to proceed with plan.      1. PVD (peripheral vascular disease) (CMS/HCC)  Normal  Arterial Flow bilateral Lower Extremity remained stable  Medical Management: ASA,PLAVIX  If signs and symptoms of ischemia should occur including but not limited to pale/blue discoloration of limb, increasing pain with ambulation or at rest, or a non-healing wound. Patient is to notify Heart and Vascular center for immediate evaluation.   Return 6 mos  - Doppler Ankle Brachial Index Single Level CAR; Future    2. Mixed hyperlipidemia  Intolerant to Statin Therapy  Heart Healthy diet    3. Essential hypertension  controlled    4. Tobacco abuse  Smoking cessation assistance options offered including behavioral counseling (Smoking Cessation Classes), Nicotine replacement therapy (patches or gum), pharmacologic therapy (Chantix, Wellbutrin). Understands tobacco increases risk of expanding AAA, MI, CVA, PAD, carcinoma. Discussion and question answer period 5-7 minutes. Paddy Brantley  reports that he has been smoking cigarettes. He has a 40.00 pack-year smoking history. He has never used smokeless tobacco.. I have educated him on the risk of diseases from using tobacco products such as cancer, COPD and heart disease.     I advised him to quit and he is not willing to quit.    I spent 5 minutes counseling the patient.      5. Carotid stenosis, asymptomatic, bilateral  Carotid Stenosis  2016 Asymptomatic  If you should experience any  neurological symptoms including but not limited to visual or speech disturbances confusion, seizures, or weakness of limbs of one side of your body notify Heart and Vascular center immediately for evaluation or if after hours present to the nearest Emergency Department.    Duplex - 6 mos  - Duplex Carotid Ultrasound CAR; Future       Patient's Body mass index is 34.64 kg/m². indicating that he is obese (BMI >30). Obesity-related health conditions include the following: hypertension, coronary heart disease, dyslipidemias and peripheral vascular disease. Obesity is unchanged. BMI is is above average; BMI management plan is completed. We discussed low calorie, low carb based diet program, portion control and increasing exercise..     Time spent 30 minutes in face to face evaluation, reviewing of medical history, tests, and procedures. Independent interpretation of vascular studies, ordering additional tests, documentation, and coordination of care.   Counseling and education with the patient and family regarding treatment options, plan of care, and hoped for outcomes. All questions answered.       Advance Care Planning discussed and  Informational packet given to patient. Advance Care Plan on file: No          This document has been electronically signed by GISSELLE George on September 8, 2021 11:17 CDT

## 2022-03-07 ENCOUNTER — OFFICE VISIT (OUTPATIENT)
Dept: CARDIAC SURGERY | Facility: CLINIC | Age: 62
End: 2022-03-07

## 2022-03-07 VITALS
TEMPERATURE: 98.6 F | SYSTOLIC BLOOD PRESSURE: 130 MMHG | HEIGHT: 68 IN | DIASTOLIC BLOOD PRESSURE: 78 MMHG | OXYGEN SATURATION: 97 % | HEART RATE: 73 BPM | WEIGHT: 228.8 LBS | BODY MASS INDEX: 34.68 KG/M2

## 2022-03-07 DIAGNOSIS — Z72.0 TOBACCO ABUSE: ICD-10-CM

## 2022-03-07 DIAGNOSIS — N18.2 CHRONIC KIDNEY DISEASE, STAGE 2 (MILD): ICD-10-CM

## 2022-03-07 DIAGNOSIS — I65.23 CAROTID STENOSIS, ASYMPTOMATIC, BILATERAL: ICD-10-CM

## 2022-03-07 DIAGNOSIS — I73.9 PVD (PERIPHERAL VASCULAR DISEASE): Primary | ICD-10-CM

## 2022-03-07 DIAGNOSIS — E78.2 MIXED HYPERLIPIDEMIA: ICD-10-CM

## 2022-03-07 DIAGNOSIS — I10 ESSENTIAL HYPERTENSION: ICD-10-CM

## 2022-03-07 DIAGNOSIS — Z91.041 ALLERGY TO INTRAVENOUS CONTRAST: ICD-10-CM

## 2022-03-07 PROCEDURE — 99215 OFFICE O/P EST HI 40 MIN: CPT | Performed by: NURSE PRACTITIONER

## 2022-03-07 RX ORDER — DULOXETIN HYDROCHLORIDE 30 MG/1
30 CAPSULE, DELAYED RELEASE ORAL NIGHTLY
COMMUNITY

## 2022-03-07 RX ORDER — DIPHENHYDRAMINE HYDROCHLORIDE 50 MG/ML
50 INJECTION INTRAMUSCULAR; INTRAVENOUS ONCE
Status: CANCELLED | OUTPATIENT
Start: 2022-03-07 | End: 2022-03-07

## 2022-03-07 RX ORDER — PREDNISONE 50 MG/1
TABLET ORAL
Qty: 3 TABLET | Refills: 6 | Status: SHIPPED | OUTPATIENT
Start: 2022-03-07 | End: 2022-04-14

## 2022-03-07 NOTE — PROGRESS NOTES
Paddy Brantley  1960    Chief Complaint:    Chief Complaint   Patient presents with   • Carotid Artery Disease   • Peripheral Vascular Disease       HPI:      PCP: Dr Iniguez   Cardiology: Dr. Resendez     61y.o. male with HTN(uncontrolled, increased risk stroke, rupture), Diabetes Mellitus(stable, increased risk cardiovascular events), Obesity(uncontrolled, increased risk cardiovascular events), Smoker(uncontrolled, increased risk cardiovascular events), COPD(stable, increased risk pulmonary complications) and Chronic Kidney Disease(stable, increased risk renal failure) CAD(stable, increase risk cardiovascular events).  smokes 1 PPD. moderate leg pain walking 50ft x4yrs, worse past 6 months. Worse walking incline. 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  New claudication earlier this year, lifestyle limiting, underwent arteriogram,symptoms improved.   No other associated signs, symptoms or modifying factors. Returns for follow up.      11/2016: Carotid duplex: BICA <50%   3/2022: Carotid Duplex: RIGHT 50-69% (157/45cm/s, ratio 2.4) LEFT >70% (345/106cm/s, ratio 4.3) Antegrade     12/2020 DAISHA:  RIGHT .71 biphasic. , LEFT .59 biphasic.  2/2021 CTA Aorta runoff:  RIGHT SFA 80%, 3v runoff.  LEFT common iliac 90/70%, 3v runoff.  3/2021 Aortagram: Bilateral PEGGY stent (kissing), R SFA PTA  4/2021 DAISHA: Right 0.87 triphasic.  Left 0.94 triphasic.  6/2/21: DAISHA: RIGHT 0.8 Triphasic LEFT 0.93 Triphasic   9/7/21: DAISHA: RIGHT 0.84 Triphasic LEFT 0.93 Triphasic    3/2022: DAISHA: RIGHT 0.92 Triphasic LEFT 1.0 Triphasic     11/2016 echocardiogram: EF 50-55%, mild MAC, trace MR, diastolic dysfunction  11/2016 Cardiac Catheterization: 70% LAD, 70% circumflex, 80% RCA, 70% diagonal  11/2016 CABG x4 LIMA-LAD, SVG-OM, SVG-DX, SVG-PDA, EVH  12/2017 Sternal revision and Repair with Sternal Plates.   3/2019 CXR:  Broken 4th  place distal sternum  7/2019 CT Chest:  First 3 sternal plates intact.  Lower sternal separation with broken plate.  No change or migration of hardware.  10/2020 Stress Test:  EF 65%, no ischemia.  Low risk study.    The following portions of the patient's history were reviewed and updated as appropriate: allergies, current medications, past family history, past medical history, past social history, past surgical history and problem list.  Recent images independently reviewed.  Available laboratory values reviewed.    PMH:  Past Medical History:   Diagnosis Date   • Accident caused by hypodermic needle    • Guerra esophagus    • Contact with and (suspected) exposure to potentially hazardous body fluids    • Diabetes mellitus (HCC)    • GERD (gastroesophageal reflux disease)    • Kidney stones    • Meniere's disease    • MRSA infection     right ing hernia   • PAD (peripheral artery disease) (HCC)      Past Surgical History:   Procedure Laterality Date   • APPENDECTOMY     • ARTERIOGRAM N/A 4/1/2021    Procedure: lower extremity Arteriogram possible angioplasty stent atherectomy thrombolysis;  Surgeon: Homero Sutton MD;  Location: [facility] INV[facility] LOCATION;  Service: Interventional Radiology;  Laterality: N/A;   • CARDIAC CATHETERIZATION     • CARDIAC SURGERY  11/22/2016    CABG   • CHEST TUBE INSERTION     • CORONARY ARTERY BYPASS GRAFT     • GALLBLADDER SURGERY     • INCISION AND DRAINAGE ABSCESS     • INGUINAL HERNIA REPAIR Bilateral    • LUMBAR LAMINECTOMY     • STERNAL REWIRING N/A 12/19/2017    Procedure: REPAIR STERNAL DEHISCENCE WITH STERNAL DEBRIDEMENT  Need plate and screws;  Surgeon: Homero Sutton MD;  Location: [facility] OR;  Service:    • UMBILICAL HERNIA REPAIR       History reviewed. No pertinent family history.  Social History     Tobacco Use   • Smoking status: Current Every Day Smoker     Packs/day: 1.00     Years: 40.00     Pack years: 40.00     Types: Cigarettes     Last  "attempt to quit: 1/3/2017     Years since quittin.1   • Smokeless tobacco: Never Used   Vaping Use   • Vaping Use: Never used   Substance Use Topics   • Alcohol use: No   • Drug use: No       ALLERGIES:  Allergies   Allergen Reactions   • Amiodarone Anaphylaxis   • Contrast Dye Anaphylaxis   • Iodine Anaphylaxis   • Multivitamins Anaphylaxis     Oyster calcium   • Pepcid [Famotidine] Shortness Of Breath     Pt has taken before with adverse reaction   • Proton Pump Inhibitors Shortness Of Breath   • Shellfish-Derived Products Anaphylaxis   • Dexamethasone Other (See Comments)     hiccups   • Livalo [Pitavastatin] Swelling   • Statins Swelling   • Tetracyclines & Related Other (See Comments)     \"skin falls off\"   • Penicillins Rash         MEDICATIONS:    Current Outpatient Medications:   •  albuterol sulfate  (90 Base) MCG/ACT inhaler, Inhale 2 puffs Every 4 (Four) Hours As Needed for Wheezing., Disp: , Rfl:   •  amLODIPine (NORVASC) 2.5 MG tablet, Take 2.5 mg by mouth 2 (Two) Times a Day., Disp: , Rfl:   •  aspirin 325 MG tablet, Take 325 mg by mouth Every 12 (Twelve) Hours., Disp: , Rfl:   •  Biotin 5000 MCG tablet, Take 5,000 mcg by mouth Daily. 2 tablets, Disp: , Rfl:   •  CARTIA  MG 24 hr capsule, TAKE 1 CAPSULE BY MOUTH EVERY NIGHT, Disp: 90 capsule, Rfl: 0  •  clopidogrel (Plavix) 75 MG tablet, Take 1 tablet by mouth Daily., Disp: 30 tablet, Rfl: 11  •  DULoxetine (CYMBALTA) 30 MG capsule, Take 30 mg by mouth Daily., Disp: , Rfl:   •  fluticasone-salmeterol (ADVAIR) 250-50 MCG/DOSE DISKUS, Inhale 2 (Two) Times a Day., Disp: , Rfl:   •  Insulin Syringe 29G X 1/2\" 1 ML misc, See Admin Instructions., Disp: , Rfl:   •  NOVOLIN R 100 UNIT/ML injection, Inject 22 Units as directed 2 (Two) Times a Day., Disp: , Rfl:   •  ranolazine (Ranexa) 500 MG 12 hr tablet, Take 1 tablet by mouth 2 (Two) Times a Day., Disp: 180 tablet, Rfl: 3  •  tadalafil (CIALIS) 5 MG tablet, Take 5 mg by mouth Daily., Disp: , " "Rfl:   •  tiotropium (SPIRIVA) 18 MCG per inhalation capsule, Place 1 capsule into inhaler and inhale Daily., Disp: , Rfl:   •  cimetidine (Tagamet HB) 200 MG tablet, Take 2 tablets by mouth Every Night., Disp: 30 tablet, Rfl: 0  •  insulin NPH (humuLIN N,novoLIN N) 100 UNIT/ML injection, Inject 42 Units under the skin 2 (Two) Times a Day., Disp: , Rfl:   •  predniSONE (DELTASONE) 50 MG tablet, Take 1 tablet 13hrs, 7hrs, and 1hr before CT Scan. Continue with 50mg daily x 3 days post CT scan, Disp: 3 tablet, Rfl: 6  •  rOPINIRole (REQUIP) 1 MG tablet, Take 1 mg by mouth 3 (Three) Times a Day., Disp: , Rfl:     Review of Systems   Review of Systems   Constitutional: Negative for malaise/fatigue and weight loss.   Cardiovascular: Negative for chest pain, claudication and dyspnea on exertion.   Respiratory: Negative for cough and shortness of breath.    Skin: Negative for color change and poor wound healing.   Musculoskeletal: Positive for muscle weakness. Negative for back pain.   Neurological: Negative for dizziness, numbness and weakness.       Physical Exam   Vitals:    03/07/22 1505 03/07/22 1506   BP: 126/72 130/78   BP Location: Left arm Right arm   Pulse: 73    Temp: 98.6 °F (37 °C)    TempSrc: Infrared    SpO2: 97%    Weight: 104 kg (228 lb 12.8 oz)    Height: 172.7 cm (68\")      Physical Exam  Constitutional:       General: He is not in acute distress.     Appearance: He is not ill-appearing.   HENT:      Right Ear: Hearing normal.      Left Ear: Hearing normal.      Nose: No nasal deformity.      Mouth/Throat:      Dentition: Normal dentition. Does not have dentures.   Neck:      Vascular: No carotid bruit.   Cardiovascular:      Rate and Rhythm: Normal rate and regular rhythm.      Pulses:           Carotid pulses are 2+ on the right side and 2+ on the left side.       Radial pulses are 2+ on the right side and 2+ on the left side.        Dorsalis pedis pulses are 2+ on the right side and 2+ on the left " side.        Posterior tibial pulses are 2+ on the right side and 2+ on the left side.      Heart sounds: No murmur heard.  Pulmonary:      Effort: Pulmonary effort is normal.      Breath sounds: Normal breath sounds.   Abdominal:      General: There is no distension.      Palpations: Abdomen is soft. There is no mass.      Tenderness: There is no abdominal tenderness.   Musculoskeletal:         General: No deformity.      Cervical back: Neck supple.   Skin:     General: Skin is warm and dry.      Coloration: Skin is not pale.      Findings: No erythema.      Comments: No venous staining   Neurological:      Mental Status: He is alert and oriented to person, place, and time.      Gait: Gait normal.   Psychiatric:         Speech: Speech normal.         Behavior: Behavior is cooperative.         Thought Content: Thought content normal.         Judgment: Judgment normal.           ASSESSMENT/PLAN:  Detailed discussion regarding risks, benefits, and treatment plan. Images independently reviewed. Patient understands, agrees, and wishes to proceed with plan.      1. PVD (peripheral vascular disease) (HCC)  Normal  Arterial Flow bilateral Lower Extremity remained stable  Medical Management: ASA,PLAVIX  If signs and symptoms of ischemia should occur including but not limited to pale/blue discoloration of limb, increasing pain with ambulation or at rest, or a non-healing wound. Patient is to notify Heart and Vascular center for immediate evaluation.   Return 6 mos     2. Carotid stenosis, asymptomatic, bilateral  severe Carotid Artery Stenosis left worsened Asymptomatic   Proceed with CTA Carotids-Follow up [place] for results  Medical Management: ASA,PLAVIX  Intolerant to Statin therapy: Refuses equivocal therapies  Not TCAR candidate.   If you should experience any neurological symptoms including but not limited to visual or speech disturbances confusion, seizures, or weakness of limbs of one side of your body notify  [facility] and Vascular center immediately for evaluation or if after hours present to the nearest Emergency Department.    Return as scheduled   - CT Angiogram Carotids; Future    3. Mixed hyperlipidemia  Intolerant to Statin therapy  Refuses alternative equivocal therapies: Mason Haddad    4. Chronic kidney disease, stage 2 (mild)  Lab Results   Component Value Date    CREATININE 1.24 02/03/2021       - Ambulatory Referral to Family Practice    5. Essential hypertension  Controlled.   - Ambulatory Referral to Family Practice    6. Tobacco abuse  Smoking cessation assistance options offered including behavioral counseling (Smoking Cessation Classes), Nicotine replacement therapy (patches or gum), pharmacologic therapy (Chantix, Wellbutrin). Understands tobacco increases risk of expanding AAA, MI, CVA, PAD, carcinoma. Discussion and question answer period 5-7 minutes. Paddy Brantley  reports that he has been smoking cigarettes. He has a 40.00 pack-year smoking history. He has never used smokeless tobacco.. I have educated him on the risk of diseases from using tobacco products such as cancer, COPD and heart disease.     I advised him to quit and he is not willing to quit.    I spent 5 minutes counseling the patient.      7. Allergy to intravenous contrast  Anaphylaxis.   Requested extended steroid coverage. PO and IV ordered.   - predniSONE (DELTASONE) 50 MG tablet; Take 1 tablet 13hrs, 7hrs, and 1hr before CT Scan. Continue with 50mg daily x 3 days post CT scan  Dispense: 3 tablet; Refill: 6  - cimetidine (Tagamet HB) 200 MG tablet; Take 2 tablets by mouth Every Night.  Dispense: 30 tablet; Refill: 0      Patient's Body mass index is 34.79 kg/m². indicating that he is obese (BMI >30). Obesity-related health conditions include the following: hypertension, coronary heart disease, dyslipidemias and peripheral vascular disease. Obesity is unchanged. BMI is is above average; BMI management plan is completed. We  discussed low calorie, low carb based diet program, portion control and increasing exercise..     Time spent 40 minutes in face to face evaluation, reviewing of medical history, tests, and procedures. Independent interpretation of vascular studies, ordering additional tests, documentation, and coordination of care.   Counseling and education with the patient and family regarding treatment options, plan of care, and hoped for outcomes. All questions answered.       Advance Care Planning discussed and  Informational packet given to patient. Advance Care Plan on file: No          This document has been electronically signed by GISSELLE George on March 8, 2022 10:37 CST

## 2022-03-07 NOTE — PATIENT INSTRUCTIONS
severe Carotid Artery Stenosis left worsened Asymptomatic   Proceed with CTA Carotids-Follow up [place] for results  Medical Management: ASA,PLAVIX  Intolerant to Statin therapy   If you should experience any neurological symptoms including but not limited to visual or speech disturbances confusion, seizures, or weakness of limbs of one side of your body notify Heart and Vascular center immediately for evaluation or if after hours present to the nearest Emergency Department.    Return as scheduled     Normal   Arterial Flow bilateral Lower Extremity remained stable  Medical Management: ASA,PLAVIX  If signs and symptoms of ischemia should occur including but not limited to pale/blue discoloration of limb, increasing pain with ambulation or at rest, or a non-healing wound. Patient is to notify [facility] V[facility] center for immediate evaluation.   Return 6 mos     Smoking cessation advised.  Tobacco increases risk of expanding AAA, MI, CVA, PAD, carcinoma.

## 2022-03-22 ENCOUNTER — HOSPITAL ENCOUNTER (OUTPATIENT)
Dept: CT IMAGING | Facility: HOSPITAL | Age: 62
Discharge: HOME | End: 2022-03-22

## 2022-03-22 ENCOUNTER — TRANSCRIBE ORDERS (OUTPATIENT)
Dept: CT IMAGING | Facility: HOSPITAL | Age: 62
End: 2022-03-22

## 2022-03-22 ENCOUNTER — APPOINTMENT (OUTPATIENT)
Dept: LAB | Facility: HOSPITAL | Age: 62
End: 2022-03-22

## 2022-03-22 VITALS
HEART RATE: 90 BPM | WEIGHT: 225.53 LBS | HEIGHT: 68 IN | SYSTOLIC BLOOD PRESSURE: 138 MMHG | RESPIRATION RATE: 20 BRPM | TEMPERATURE: 98.1 F | OXYGEN SATURATION: 95 % | BODY MASS INDEX: 34.18 KG/M2 | DIASTOLIC BLOOD PRESSURE: 67 MMHG

## 2022-03-22 DIAGNOSIS — I65.23 CAROTID STENOSIS, ASYMPTOMATIC, BILATERAL: ICD-10-CM

## 2022-03-22 DIAGNOSIS — Z91.041 ALLERGY TO INTRAVENOUS CONTRAST: ICD-10-CM

## 2022-03-22 DIAGNOSIS — Z91.041 ALLERGY TO INTRAVENOUS CONTRAST: Primary | ICD-10-CM

## 2022-03-22 DIAGNOSIS — I65.23 BILATERAL CAROTID ARTERY STENOSIS: Primary | ICD-10-CM

## 2022-03-22 DIAGNOSIS — I65.23 BILATERAL CAROTID ARTERY STENOSIS: ICD-10-CM

## 2022-03-22 LAB
ANION GAP SERPL CALCULATED.3IONS-SCNC: 12 MMOL/L (ref 5–15)
BUN SERPL-MCNC: 19 MG/DL (ref 8–23)
BUN/CREAT SERPL: 13.9 (ref 7–25)
CALCIUM SPEC-SCNC: 9.8 MG/DL (ref 8.6–10.5)
CHLORIDE SERPL-SCNC: 103 MMOL/L (ref 98–107)
CO2 SERPL-SCNC: 21 MMOL/L (ref 22–29)
CREAT SERPL-MCNC: 1.37 MG/DL (ref 0.76–1.27)
EGFRCR SERPLBLD CKD-EPI 2021: 58 ML/MIN/1.73
GLUCOSE SERPL-MCNC: 251 MG/DL (ref 65–99)
POTASSIUM SERPL-SCNC: 4.7 MMOL/L (ref 3.5–5.2)
SODIUM SERPL-SCNC: 136 MMOL/L (ref 136–145)

## 2022-03-22 PROCEDURE — 70498 CT ANGIOGRAPHY NECK: CPT

## 2022-03-22 PROCEDURE — 80048 BASIC METABOLIC PNL TOTAL CA: CPT | Performed by: RADIOLOGY

## 2022-03-22 PROCEDURE — 25010000002 DIPHENHYDRAMINE PER 50 MG: Performed by: NURSE PRACTITIONER

## 2022-03-22 PROCEDURE — 0 IOPAMIDOL PER 1 ML: Performed by: NURSE PRACTITIONER

## 2022-03-22 PROCEDURE — 25010000002 METHYLPREDNISOLONE PER 125 MG: Performed by: NURSE PRACTITIONER

## 2022-03-22 PROCEDURE — 25010000002 METHYLPREDNISOLONE PER 40 MG: Performed by: NURSE PRACTITIONER

## 2022-03-22 RX ORDER — DIPHENHYDRAMINE HYDROCHLORIDE 50 MG/ML
50 INJECTION INTRAMUSCULAR; INTRAVENOUS ONCE
Status: COMPLETED | OUTPATIENT
Start: 2022-03-22 | End: 2022-03-22

## 2022-03-22 RX ORDER — DIPHENHYDRAMINE HCL 25 MG
25 CAPSULE ORAL DAILY PRN
COMMUNITY
End: 2022-04-29

## 2022-03-22 RX ORDER — METHYLPREDNISOLONE SODIUM SUCCINATE 40 MG/ML
40 INJECTION, POWDER, LYOPHILIZED, FOR SOLUTION INTRAMUSCULAR; INTRAVENOUS EVERY 4 HOURS
Status: DISCONTINUED | OUTPATIENT
Start: 2022-03-22 | End: 2022-03-22

## 2022-03-22 RX ORDER — METHYLPREDNISOLONE SODIUM SUCCINATE 125 MG/2ML
125 INJECTION, POWDER, LYOPHILIZED, FOR SOLUTION INTRAMUSCULAR; INTRAVENOUS ONCE
Status: CANCELLED | OUTPATIENT
Start: 2022-03-22

## 2022-03-22 RX ORDER — METHYLPREDNISOLONE SODIUM SUCCINATE 40 MG/ML
40 INJECTION, POWDER, LYOPHILIZED, FOR SOLUTION INTRAMUSCULAR; INTRAVENOUS ONCE
Status: COMPLETED | OUTPATIENT
Start: 2022-03-22 | End: 2022-03-22

## 2022-03-22 RX ORDER — METHYLPREDNISOLONE SODIUM SUCCINATE 125 MG/2ML
125 INJECTION, POWDER, LYOPHILIZED, FOR SOLUTION INTRAMUSCULAR; INTRAVENOUS ONCE
Status: COMPLETED | OUTPATIENT
Start: 2022-03-22 | End: 2022-03-22

## 2022-03-22 RX ADMIN — METHYLPREDNISOLONE SODIUM SUCCINATE 125 MG: 125 INJECTION, POWDER, FOR SOLUTION INTRAMUSCULAR; INTRAVENOUS at 14:50

## 2022-03-22 RX ADMIN — METHYLPREDNISOLONE SODIUM SUCCINATE 40 MG: 40 INJECTION, POWDER, FOR SOLUTION INTRAMUSCULAR; INTRAVENOUS at 15:48

## 2022-03-22 RX ADMIN — DIPHENHYDRAMINE HYDROCHLORIDE 50 MG: 50 INJECTION INTRAMUSCULAR; INTRAVENOUS at 14:26

## 2022-03-22 RX ADMIN — IOPAMIDOL 90 ML: 755 INJECTION, SOLUTION INTRAVENOUS at 15:22

## 2022-03-28 ENCOUNTER — OFFICE VISIT (OUTPATIENT)
Dept: CARDIAC SURGERY | Facility: CLINIC | Age: 62
End: 2022-03-28

## 2022-03-28 VITALS
SYSTOLIC BLOOD PRESSURE: 140 MMHG | HEIGHT: 68 IN | WEIGHT: 225 LBS | HEART RATE: 87 BPM | BODY MASS INDEX: 34.1 KG/M2 | DIASTOLIC BLOOD PRESSURE: 70 MMHG | OXYGEN SATURATION: 99 % | TEMPERATURE: 98 F

## 2022-03-28 DIAGNOSIS — N18.2 CHRONIC KIDNEY DISEASE, STAGE 2 (MILD): ICD-10-CM

## 2022-03-28 DIAGNOSIS — I10 ESSENTIAL HYPERTENSION: ICD-10-CM

## 2022-03-28 DIAGNOSIS — I73.9 PVD (PERIPHERAL VASCULAR DISEASE): ICD-10-CM

## 2022-03-28 DIAGNOSIS — Z79.4 TYPE 2 DIABETES MELLITUS WITH OTHER CIRCULATORY COMPLICATION, WITH LONG-TERM CURRENT USE OF INSULIN: ICD-10-CM

## 2022-03-28 DIAGNOSIS — Z95.1 S/P CABG (CORONARY ARTERY BYPASS GRAFT): ICD-10-CM

## 2022-03-28 DIAGNOSIS — E66.09 CLASS 1 OBESITY DUE TO EXCESS CALORIES WITH SERIOUS COMORBIDITY AND BODY MASS INDEX (BMI) OF 34.0 TO 34.9 IN ADULT: ICD-10-CM

## 2022-03-28 DIAGNOSIS — E11.59 TYPE 2 DIABETES MELLITUS WITH OTHER CIRCULATORY COMPLICATION, WITH LONG-TERM CURRENT USE OF INSULIN: ICD-10-CM

## 2022-03-28 DIAGNOSIS — I25.118 CORONARY ARTERY DISEASE OF NATIVE ARTERY OF NATIVE HEART WITH STABLE ANGINA PECTORIS: ICD-10-CM

## 2022-03-28 DIAGNOSIS — E78.2 MIXED HYPERLIPIDEMIA: ICD-10-CM

## 2022-03-28 DIAGNOSIS — I65.23 CAROTID STENOSIS, ASYMPTOMATIC, BILATERAL: Primary | ICD-10-CM

## 2022-03-28 DIAGNOSIS — F17.218 NICOTINE DEPENDENCE, CIGARETTES, WITH OTHER NICOTINE-INDUCED DISORDERS: ICD-10-CM

## 2022-03-28 PROCEDURE — 99215 OFFICE O/P EST HI 40 MIN: CPT | Performed by: THORACIC SURGERY (CARDIOTHORACIC VASCULAR SURGERY)

## 2022-03-28 RX ORDER — CLINDAMYCIN PHOSPHATE 900 MG/50ML
900 INJECTION INTRAVENOUS ONCE
Status: CANCELLED | OUTPATIENT
Start: 2022-04-20 | End: 2022-03-28

## 2022-03-28 RX ORDER — ATORVASTATIN CALCIUM 20 MG/1
20 TABLET, FILM COATED ORAL DAILY
Qty: 30 TABLET | Refills: 1 | Status: SHIPPED | OUTPATIENT
Start: 2022-03-28 | End: 2022-03-28

## 2022-03-28 RX ORDER — ATORVASTATIN CALCIUM 20 MG/1
20 TABLET, FILM COATED ORAL DAILY
Qty: 90 TABLET | Refills: 2 | Status: SHIPPED | OUTPATIENT
Start: 2022-03-28 | End: 2022-04-25

## 2022-03-28 RX ORDER — SODIUM CHLORIDE 9 MG/ML
100 INJECTION, SOLUTION INTRAVENOUS CONTINUOUS
Status: CANCELLED | OUTPATIENT
Start: 2022-04-20

## 2022-04-05 PROBLEM — E66.09 CLASS 1 OBESITY DUE TO EXCESS CALORIES WITH SERIOUS COMORBIDITY AND BODY MASS INDEX (BMI) OF 34.0 TO 34.9 IN ADULT: Status: ACTIVE | Noted: 2022-04-05

## 2022-04-05 PROBLEM — F17.218 NICOTINE DEPENDENCE, CIGARETTES, WITH OTHER NICOTINE-INDUCED DISORDERS: Status: ACTIVE | Noted: 2022-04-05

## 2022-04-05 NOTE — PROGRESS NOTES
4/5/2022    Paddy Brantley  1960    Chief Complaint:    Chief Complaint   Patient presents with   • Carotid Artery Disease       HPI:      PCP:  Kwaku Iniguez DO  Cardiology: Dr. Resendez     61y.o. male with HTN(uncontrolled, increased risk stroke, rupture), Diabetes Mellitus(stable, increased risk cardiovascular events), Obesity(uncontrolled, increased risk cardiovascular events), Smoker(uncontrolled, increased risk cardiovascular events), COPD(stable, increased risk pulmonary complications) and Chronic Kidney Disease(stable, increased risk renal failure) CAD(stable, increase risk cardiovascular events).  smokes 1 PPD. moderate leg pain walking 50ft x4yrs, worse past 6 months. Worse walking incline. 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 fall in which his sternum made first contact with the ground followed by his head, chest x-ray revealed fracture of sternal plating at the base of the sternum, he has significant pain, reports inability to perform job duties effectively due to his pain.  Off 6 weeks from work, feels unable to return due to pain intolerance. Pain has since improved, feels lower sternal area pop/click.  No other associated signs, symptoms or modifying factors.     11/2016: Carotid duplex: BICA <50%   3/2022 Carotid duplex:  RENU 50-69% (158/46cm/s, ratio 1.6), LICA >70% (345cm/s, ratio 2.1).  Antegrade verts.  3/2022 CTA Carotids:  RENU 60%, LICA 80%     12/2020 DAISHA:  RIGHT .71 biphasic. , LEFT .59 biphasic.  2/2021 CTA Aorta runoff:  RIGHT SFA 80%, 3v runoff.  LEFT common iliac 90/70%, 3v runoff.  3/2021 Aortagram: Bilateral PEGGY stent (kissing), R SFA PTA  4/2021 DAISHA: Right 0.87 triphasic.  Left 0.94 triphasic.  6/2/21: DAISHA: RIGHT 0.8 Triphasic LEFT 0.93 Triphasic   9/7/21: DAISHA: RIGHT 0.84 Triphasic LEFT 0.93 Triphasic    3/2022: DAISHA: RIGHT 0.92 Triphasic LEFT 1.0  Triphasic     11/2016 echocardiogram: EF 50-55%, mild MAC, trace MR, diastolic dysfunction  11/2016 Cardiac Catheterization: 70% LAD, 70% circumflex, 80% RCA, 70% diagonal  11/2016 CABG x4 LIMA-LAD, SVG-OM, SVG-DX, SVG-PDA, EVH  12/2017 Sternal revision and Repair with Sternal Plates.   3/2019 CXR:  Broken 4th place distal sternum  7/2019 CT Chest:  First 3 sternal plates intact.  Lower sternal separation with broken plate.  No change or migration of hardware.  10/2020 Stress Test:  EF 65%, no ischemia.  Low risk study.      The following portions of the patient's history were reviewed and updated as appropriate: allergies, current medications, past family history, past medical history, past social history, past surgical history and problem list.  Recent images independently reviewed.  Available laboratory values reviewed.    PMH:  Past Medical History:   Diagnosis Date   • Accident caused by hypodermic needle    • Guerra esophagus    • Contact with and (suspected) exposure to potentially hazardous body fluids    • Diabetes mellitus (HCC)    • GERD (gastroesophageal reflux disease)    • Kidney stones    • Meniere's disease    • MRSA infection     right ing hernia   • PAD (peripheral artery disease) (HCC)      Past Surgical History:   Procedure Laterality Date   • APPENDECTOMY     • ARTERIOGRAM N/A 4/1/2021    Procedure: lower extremity Arteriogram possible angioplasty stent atherectomy thrombolysis;  Surgeon: Homero Sutton MD;  Location: [facility];  Service: Interventional Radiology;  Laterality: N/A;   • CARDIAC CATHETERIZATION     • CARDIAC SURGERY  11/22/2016    CABG   • CHEST TUBE INSERTION     • CORONARY ARTERY BYPASS GRAFT     • GALLBLADDER SURGERY     • INCISION AND DRAINAGE ABSCESS     • INGUINAL HERNIA REPAIR Bilateral    • LUMBAR LAMINECTOMY     • STERNAL REWIRING N/A 12/19/2017    Procedure: REPAIR STERNAL DEHISCENCE WITH STERNAL DEBRIDEMENT  Need plate and screws;   "Surgeon: Homero Sutton MD;  Location: [facility];  Service:    • UMBILICAL HERNIA REPAIR       No family history on file.  Social History     Tobacco Use   • Smoking status: Current Every Day Smoker     Packs/day: 0.25     Years: 40.00     Pack years: 10.00     Types: Cigarettes   • Smokeless tobacco: Never Used   Vaping Use   • Vaping Use: Never used   Substance Use Topics   • Alcohol use: No   • Drug use: No       ALLERGIES:  Allergies   Allergen Reactions   • Amiodarone Anaphylaxis   • Contrast Dye Anaphylaxis   • Iodine Anaphylaxis   • Multivitamins Anaphylaxis     Oyster calcium   • Pepcid [Famotidine] Shortness Of Breath     Pt has taken before with adverse reaction   • Proton Pump Inhibitors Shortness Of Breath   • Shellfish-Derived Products Anaphylaxis   • Dexamethasone Other (See Comments)     hiccups   • Livalo [Pitavastatin] Swelling   • Statins Swelling   • Tetracyclines & Related Other (See Comments)     \"skin falls off\"   • Penicillins Rash         MEDICATIONS:    Current Outpatient Medications:   •  albuterol sulfate  (90 Base) MCG/ACT inhaler, Inhale 2 puffs Every 4 (Four) Hours As Needed for Wheezing., Disp: , Rfl:   •  amLODIPine (NORVASC) 2.5 MG tablet, Take 2.5 mg by mouth 2 (Two) Times a Day., Disp: , Rfl:   •  aspirin 325 MG tablet, Take 325 mg by mouth Every 12 (Twelve) Hours., Disp: , Rfl:   •  Biotin 5000 MCG tablet, Take 5,000 mcg by mouth Daily. 2 tablets, Disp: , Rfl:   •  CARTIA  MG 24 hr capsule, TAKE 1 CAPSULE BY MOUTH EVERY NIGHT, Disp: 90 capsule, Rfl: 0  •  cimetidine (Tagamet HB) 200 MG tablet, Take 2 tablets by mouth Every Night., Disp: 30 tablet, Rfl: 0  •  clopidogrel (Plavix) 75 MG tablet, Take 1 tablet by mouth Daily., Disp: 30 tablet, Rfl: 11  •  diphenhydrAMINE (BENADRYL) 25 mg capsule, Take 25 mg by mouth Daily As Needed for Itching., Disp: , Rfl:   •  DULoxetine (CYMBALTA) 30 MG capsule, Take 30 mg by mouth Daily., Disp: , Rfl:   •  " "fluticasone-salmeterol (ADVAIR) 250-50 MCG/DOSE DISKUS, Inhale 2 (Two) Times a Day., Disp: , Rfl:   •  insulin NPH (humuLIN N,novoLIN N) 100 UNIT/ML injection, Inject 42 Units under the skin 2 (Two) Times a Day., Disp: , Rfl:   •  Insulin Syringe 29G X 1/2\" 1 ML misc, See Admin Instructions., Disp: , Rfl:   •  NOVOLIN R 100 UNIT/ML injection, Inject 22 Units as directed 2 (Two) Times a Day., Disp: , Rfl:   •  predniSONE (DELTASONE) 50 MG tablet, Take 1 tablet 13hrs, 7hrs, and 1hr before CT Scan. Continue with 50mg daily x 3 days post CT scan, Disp: 3 tablet, Rfl: 6  •  ranolazine (Ranexa) 500 MG 12 hr tablet, Take 1 tablet by mouth 2 (Two) Times a Day., Disp: 180 tablet, Rfl: 3  •  tadalafil (CIALIS) 5 MG tablet, Take 5 mg by mouth Daily., Disp: , Rfl:   •  tiotropium (SPIRIVA) 18 MCG per inhalation capsule, Place 1 capsule into inhaler and inhale Daily., Disp: , Rfl:   •  atorvastatin (LIPITOR) 20 MG tablet, TAKE 1 TABLET BY MOUTH DAILY, Disp: 90 tablet, Rfl: 2    Review of Systems   Review of Systems   Constitutional: Negative for malaise/fatigue and weight loss.   Cardiovascular: Positive for chest pain, claudication and dyspnea on exertion.   Respiratory: Negative for cough and shortness of breath.    Skin: Negative for color change and poor wound healing.   Musculoskeletal: Positive for back pain and muscle weakness.   Neurological: Negative for dizziness, numbness and weakness.       Physical Exam   Vitals:    03/28/22 1441   BP: 140/70   BP Location: Left arm   Patient Position: Sitting   Cuff Size: Adult   Pulse: 87   Temp: 98 °F (36.7 °C)   TempSrc: Temporal   SpO2: 99%   Weight: 102 kg (225 lb)   Height: 172.7 cm (68\")     Body surface area is 2.15 meters squared.  Body mass index is 34.21 kg/m².  Physical Exam  Constitutional:       General: He is not in acute distress.     Appearance: He is not ill-appearing.   HENT:      Right Ear: Hearing normal.      Left Ear: Hearing normal.      Nose: No nasal " deformity.      Mouth/Throat:      Dentition: Normal dentition. Does not have dentures.   Cardiovascular:      Rate and Rhythm: Normal rate and regular rhythm.      Pulses:           Carotid pulses are 2+ on the right side and 2+ on the left side.       Radial pulses are 2+ on the right side and 2+ on the left side.        Dorsalis pedis pulses are 1+ on the right side and 1+ on the left side.        Posterior tibial pulses are 1+ on the right side and 1+ on the left side.      Heart sounds: No murmur heard.  Pulmonary:      Effort: Pulmonary effort is normal.      Breath sounds: Normal breath sounds.   Abdominal:      General: There is no distension.      Palpations: Abdomen is soft. There is no mass.      Tenderness: There is no abdominal tenderness.   Musculoskeletal:         General: No deformity.      Comments: Gait normal.    Skin:     General: Skin is warm and dry.      Coloration: Skin is not pale.      Findings: No erythema.      Comments: No venous staining   Neurological:      Mental Status: He is alert and oriented to person, place, and time.   Psychiatric:         Speech: Speech normal.         Behavior: Behavior is cooperative.         Thought Content: Thought content normal.         Judgment: Judgment normal.         BUN   Date Value Ref Range Status   03/22/2022 19 8 - 23 mg/dL Final     Creatinine   Date Value Ref Range Status   03/22/2022 1.37 (H) 0.76 - 1.27 mg/dL Final     eGFR Non  Amer   Date Value Ref Range Status   02/03/2021 59 (L) >60 mL/min/1.73 Final       ASSESSMENT:  Diagnoses and all orders for this visit:    1. Carotid stenosis, asymptomatic, bilateral (Primary)  -     COVID PRE-OP / PRE-PROCEDURE SCREENING ORDER (NO ISOLATION) - Swab, Nasopharynx; Future  -     Case Request; Standing  -     Case Request    2. S/P CABG (coronary artery bypass graft)    3. Essential hypertension    4. Type 2 diabetes mellitus with other circulatory complication, with long-term current use of  insulin (HCC)    5. Mixed hyperlipidemia    6. Chronic kidney disease, stage 2 (mild)    7. PVD (peripheral vascular disease) (HCC)    8. Coronary artery disease of native artery of native heart with stable angina pectoris (HCC)    9. Nicotine dependence, cigarettes, with other nicotine-induced disorders    10. Class 1 obesity due to excess calories with serious comorbidity and body mass index (BMI) of 34.0 to 34.9 in adult    Other orders  -     Discontinue: atorvastatin (LIPITOR) 20 MG tablet; Take 1 tablet by mouth Daily.  Dispense: 30 tablet; Refill: 1  -     Provide NPO Instructions to Patient; Future  -     Chlorhexidine Skin Prep; Future    PLAN:  Detailed discussion with Paddy Brantley regarding situation, options and plans.  severe,  asymptomatic  carotid stenosis.  Carotid intervention is advisable.  Increased risk for Stroke and cardiovascular complications.  Carotid stenting is advisable.    Risks, including but not limited to:  Mortality, major morbidity 1%.  Stroke risk 2-3%.  bleeding, transfusion, infection, pulmonary, renal dysfunction, blood vessel and nerve injury.  Benefits:  reduction of stroke risk  Options: carotid endarterectomy, stenting and medical therapy discussed.   usually overnight stay in hospital if all well. Understands and wishes to proceed.    LEFT Transcarotid artery revascularization, carotid cerebral arteriogram, carotid stent, possible carotid endarterectomy, radial arterial line, GEN  SDS  4/20/2022    Return after above studies complete  Smoking cessation advised and assistance options offered including behavioral counseling ([employer] [place] [facility] Cessation Classes), Nicotine replacement therapy (patches or gum), pharmacologic therapy (Chantix, Wellbutrin). patient understands that continued use of tobacco products increases risk of heart disease, stroke, cancer; counseling for 3-5min.  Obesity Class  1. Increased risk cardiovascular events, sleep and breathing  disorders, joint issues, type 2 diabetes mellitus. Options for weight management, heart healthy diet, exercise programs, and associated health risks of obesity discussed.    Recommended regular physical activity, progressive walking program.  Continue current medications as directed.  Advance Care Planning   ACP discussion was declined by the patient. Patient does not have an advance directive, declines further assistance.     Thank you for the opportunity to participate in this patient's care.    Copy to primary care provider.

## 2022-04-14 ENCOUNTER — PRE-ADMISSION TESTING (OUTPATIENT)
Dept: PREADMISSION TESTING | Facility: HOSPITAL | Age: 62
End: 2022-04-14

## 2022-04-14 VITALS — WEIGHT: 227 LBS | BODY MASS INDEX: 34.4 KG/M2 | HEIGHT: 68 IN

## 2022-04-14 LAB
ABO GROUP BLD: NORMAL
BLD GP AB SCN SERPL QL: NEGATIVE
DEPRECATED RDW RBC AUTO: 38.4 FL (ref 37–54)
ERYTHROCYTE [DISTWIDTH] IN BLOOD BY AUTOMATED COUNT: 12.2 % (ref 12.3–15.4)
HCT VFR BLD AUTO: 47.1 % (ref 37.5–51)
HGB BLD-MCNC: 16.6 G/DL (ref 13–17.7)
Lab: NORMAL
MCH RBC QN AUTO: 30.6 PG (ref 26.6–33)
MCHC RBC AUTO-ENTMCNC: 35.2 G/DL (ref 31.5–35.7)
MCV RBC AUTO: 86.7 FL (ref 79–97)
PLATELET # BLD AUTO: 195 10*3/MM3 (ref 140–450)
PMV BLD AUTO: 9.2 FL (ref 6–12)
RBC # BLD AUTO: 5.43 10*6/MM3 (ref 4.14–5.8)
RH BLD: POSITIVE
T&S EXPIRATION DATE: NORMAL
WBC NRBC COR # BLD: 7.81 10*3/MM3 (ref 3.4–10.8)

## 2022-04-14 PROCEDURE — 86900 BLOOD TYPING SEROLOGIC ABO: CPT

## 2022-04-14 PROCEDURE — 85027 COMPLETE CBC AUTOMATED: CPT

## 2022-04-14 PROCEDURE — 86901 BLOOD TYPING SEROLOGIC RH(D): CPT

## 2022-04-14 PROCEDURE — 36415 COLL VENOUS BLD VENIPUNCTURE: CPT

## 2022-04-14 PROCEDURE — 93005 ELECTROCARDIOGRAM TRACING: CPT

## 2022-04-14 PROCEDURE — 86850 RBC ANTIBODY SCREEN: CPT

## 2022-04-14 PROCEDURE — 93010 ELECTROCARDIOGRAM REPORT: CPT | Performed by: INTERNAL MEDICINE

## 2022-04-14 RX ORDER — CLOPIDOGREL BISULFATE 75 MG/1
75 TABLET ORAL NIGHTLY
COMMUNITY
End: 2022-05-04

## 2022-04-15 LAB
QT INTERVAL: 372 MS
QTC INTERVAL: 407 MS

## 2022-04-15 RX ORDER — PREDNISONE 50 MG/1
TABLET ORAL
Qty: 3 TABLET | Refills: 0 | Status: ON HOLD | OUTPATIENT
Start: 2022-04-15 | End: 2022-04-20

## 2022-04-15 RX ORDER — DIPHENHYDRAMINE HCL 50 MG
50 CAPSULE ORAL ONCE
Qty: 1 CAPSULE | Refills: 0 | Status: SHIPPED | OUTPATIENT
Start: 2022-04-15 | End: 2022-04-15

## 2022-04-18 ENCOUNTER — LAB (OUTPATIENT)
Dept: LAB | Facility: HOSPITAL | Age: 62
End: 2022-04-18

## 2022-04-18 DIAGNOSIS — I65.23 CAROTID STENOSIS, ASYMPTOMATIC, BILATERAL: ICD-10-CM

## 2022-04-18 LAB — SARS-COV-2 N GENE RESP QL NAA+PROBE: NOT DETECTED

## 2022-04-18 PROCEDURE — C9803 HOPD COVID-19 SPEC COLLECT: HCPCS

## 2022-04-18 PROCEDURE — 87635 SARS-COV-2 COVID-19 AMP PRB: CPT

## 2022-04-19 ENCOUNTER — ANESTHESIA EVENT (OUTPATIENT)
Dept: PERIOP | Facility: HOSPITAL | Age: 62
End: 2022-04-19

## 2022-04-20 ENCOUNTER — HOSPITAL ENCOUNTER (INPATIENT)
Facility: HOSPITAL | Age: 62
LOS: 1 days | Discharge: HOME | End: 2022-04-21
Attending: THORACIC SURGERY (CARDIOTHORACIC VASCULAR SURGERY) | Admitting: THORACIC SURGERY (CARDIOTHORACIC VASCULAR SURGERY)

## 2022-04-20 ENCOUNTER — APPOINTMENT (OUTPATIENT)
Dept: GENERAL RADIOLOGY | Facility: HOSPITAL | Age: 62
End: 2022-04-20

## 2022-04-20 ENCOUNTER — ANESTHESIA (OUTPATIENT)
Dept: PERIOP | Facility: HOSPITAL | Age: 62
End: 2022-04-20

## 2022-04-20 DIAGNOSIS — I65.23 CAROTID STENOSIS, ASYMPTOMATIC, BILATERAL: ICD-10-CM

## 2022-04-20 LAB
ABO GROUP BLD: NORMAL
BLD GP AB SCN SERPL QL: NEGATIVE
GLUCOSE BLDC GLUCOMTR-MCNC: 142 MG/DL (ref 70–130)
GLUCOSE BLDC GLUCOMTR-MCNC: 142 MG/DL (ref 70–130)
GLUCOSE BLDC GLUCOMTR-MCNC: 168 MG/DL (ref 70–130)
GLUCOSE BLDC GLUCOMTR-MCNC: 173 MG/DL (ref 70–130)
GLUCOSE BLDC GLUCOMTR-MCNC: 196 MG/DL (ref 70–130)
Lab: NORMAL
RH BLD: POSITIVE
T&S EXPIRATION DATE: NORMAL

## 2022-04-20 PROCEDURE — 76000 FLUOROSCOPY <1 HR PHYS/QHP: CPT

## 2022-04-20 PROCEDURE — 25010000002 FENTANYL CITRATE (PF) 50 MCG/ML SOLUTION

## 2022-04-20 PROCEDURE — 37215 TRANSCATH STENT CCA W/EPS: CPT | Performed by: PHYSICIAN ASSISTANT

## 2022-04-20 PROCEDURE — 25010000002 HEPARIN (PORCINE) PER 1000 UNITS: Performed by: THORACIC SURGERY (CARDIOTHORACIC VASCULAR SURGERY)

## 2022-04-20 PROCEDURE — 86850 RBC ANTIBODY SCREEN: CPT | Performed by: ANESTHESIOLOGY

## 2022-04-20 PROCEDURE — C1894 INTRO/SHEATH, NON-LASER: HCPCS | Performed by: THORACIC SURGERY (CARDIOTHORACIC VASCULAR SURGERY)

## 2022-04-20 PROCEDURE — 86900 BLOOD TYPING SEROLOGIC ABO: CPT | Performed by: ANESTHESIOLOGY

## 2022-04-20 PROCEDURE — 94799 UNLISTED PULMONARY SVC/PX: CPT

## 2022-04-20 PROCEDURE — 037L3DZ DILATION OF LEFT INTERNAL CAROTID ARTERY WITH INTRALUMINAL DEVICE, PERCUTANEOUS APPROACH: ICD-10-PCS | Performed by: THORACIC SURGERY (CARDIOTHORACIC VASCULAR SURGERY)

## 2022-04-20 PROCEDURE — 25010000002 NEOSTIGMINE 10 MG/10ML SOLUTION

## 2022-04-20 PROCEDURE — C1725 CATH, TRANSLUMIN NON-LASER: HCPCS | Performed by: THORACIC SURGERY (CARDIOTHORACIC VASCULAR SURGERY)

## 2022-04-20 PROCEDURE — 25010000002 ONDANSETRON PER 1 MG

## 2022-04-20 PROCEDURE — 63710000001 INSULIN ISOPHANE HUMAN PER 5 UNITS: Performed by: THORACIC SURGERY (CARDIOTHORACIC VASCULAR SURGERY)

## 2022-04-20 PROCEDURE — B3141ZZ FLUOROSCOPY OF LEFT COMMON CAROTID ARTERY USING LOW OSMOLAR CONTRAST: ICD-10-PCS | Performed by: THORACIC SURGERY (CARDIOTHORACIC VASCULAR SURGERY)

## 2022-04-20 PROCEDURE — C1889 IMPLANT/INSERT DEVICE, NOC: HCPCS | Performed by: THORACIC SURGERY (CARDIOTHORACIC VASCULAR SURGERY)

## 2022-04-20 PROCEDURE — 25010000002 PROTAMINE SULFATE PER 10 MG

## 2022-04-20 PROCEDURE — C1769 GUIDE WIRE: HCPCS | Performed by: THORACIC SURGERY (CARDIOTHORACIC VASCULAR SURGERY)

## 2022-04-20 PROCEDURE — 86901 BLOOD TYPING SEROLOGIC RH(D): CPT | Performed by: ANESTHESIOLOGY

## 2022-04-20 PROCEDURE — 37215 TRANSCATH STENT CCA W/EPS: CPT | Performed by: THORACIC SURGERY (CARDIOTHORACIC VASCULAR SURGERY)

## 2022-04-20 PROCEDURE — 25010000002 PHENYLEPHRINE 10 MG/ML SOLUTION

## 2022-04-20 PROCEDURE — C1884 EMBOLIZATION PROTECT SYST: HCPCS | Performed by: THORACIC SURGERY (CARDIOTHORACIC VASCULAR SURGERY)

## 2022-04-20 PROCEDURE — 25010000002 SUCCINYLCHOLINE PER 20 MG

## 2022-04-20 PROCEDURE — 25010000002 HEPARIN (PORCINE) PER 1000 UNITS

## 2022-04-20 PROCEDURE — 25010000002 MIDAZOLAM PER 1 MG

## 2022-04-20 PROCEDURE — 82962 GLUCOSE BLOOD TEST: CPT

## 2022-04-20 PROCEDURE — 63710000001 INSULIN REGULAR HUMAN PER 5 UNITS: Performed by: THORACIC SURGERY (CARDIOTHORACIC VASCULAR SURGERY)

## 2022-04-20 PROCEDURE — 25010000002 PROPOFOL 10 MG/ML EMULSION

## 2022-04-20 PROCEDURE — C1876 STENT, NON-COA/NON-COV W/DEL: HCPCS | Performed by: THORACIC SURGERY (CARDIOTHORACIC VASCULAR SURGERY)

## 2022-04-20 PROCEDURE — 25010000002 PHENYLEPHRINE 10 MG/ML SOLUTION 5 ML VIAL

## 2022-04-20 PROCEDURE — 25010000002 IOPAMIDOL 61 % SOLUTION: Performed by: THORACIC SURGERY (CARDIOTHORACIC VASCULAR SURGERY)

## 2022-04-20 DEVICE — HEMOST ABS SURGICEL SNOW 1X2IN: Type: IMPLANTABLE DEVICE | Site: CAROTID | Status: FUNCTIONAL

## 2022-04-20 DEVICE — 10 MM X 40 MM
Type: IMPLANTABLE DEVICE | Site: CAROTID | Status: FUNCTIONAL
Brand: ENROUTE TRANSCAROTID STENT

## 2022-04-20 DEVICE — HEMOST ABS SURGIFOAM 8X6.25CM 10MM: Type: IMPLANTABLE DEVICE | Site: CAROTID | Status: FUNCTIONAL

## 2022-04-20 RX ORDER — PROPOFOL 10 MG/ML
VIAL (ML) INTRAVENOUS AS NEEDED
Status: DISCONTINUED | OUTPATIENT
Start: 2022-04-20 | End: 2022-04-20 | Stop reason: SURG

## 2022-04-20 RX ORDER — ONDANSETRON 2 MG/ML
INJECTION INTRAMUSCULAR; INTRAVENOUS AS NEEDED
Status: DISCONTINUED | OUTPATIENT
Start: 2022-04-20 | End: 2022-04-20 | Stop reason: SURG

## 2022-04-20 RX ORDER — ASPIRIN 325 MG
325 TABLET ORAL EVERY 12 HOURS SCHEDULED
Status: DISCONTINUED | OUTPATIENT
Start: 2022-04-20 | End: 2022-04-21 | Stop reason: HOSPADM

## 2022-04-20 RX ORDER — LIDOCAINE HYDROCHLORIDE 20 MG/ML
INJECTION, SOLUTION INFILTRATION; PERINEURAL AS NEEDED
Status: DISCONTINUED | OUTPATIENT
Start: 2022-04-20 | End: 2022-04-20 | Stop reason: SURG

## 2022-04-20 RX ORDER — NICOTINE POLACRILEX 4 MG
15 LOZENGE BUCCAL
Status: DISCONTINUED | OUTPATIENT
Start: 2022-04-20 | End: 2022-04-21 | Stop reason: HOSPADM

## 2022-04-20 RX ORDER — HEPARIN SODIUM 5000 [USP'U]/ML
INJECTION, SOLUTION INTRAVENOUS; SUBCUTANEOUS AS NEEDED
Status: DISCONTINUED | OUTPATIENT
Start: 2022-04-20 | End: 2022-04-20 | Stop reason: HOSPADM

## 2022-04-20 RX ORDER — SODIUM CHLORIDE 9 MG/ML
INJECTION, SOLUTION INTRAVENOUS
Status: COMPLETED
Start: 2022-04-20 | End: 2022-04-20

## 2022-04-20 RX ORDER — CLINDAMYCIN PHOSPHATE 900 MG/50ML
900 INJECTION INTRAVENOUS EVERY 8 HOURS
Status: COMPLETED | OUTPATIENT
Start: 2022-04-20 | End: 2022-04-21

## 2022-04-20 RX ORDER — SODIUM CHLORIDE 9 MG/ML
INJECTION, SOLUTION INTRAVENOUS AS NEEDED
Status: DISCONTINUED | OUTPATIENT
Start: 2022-04-20 | End: 2022-04-20 | Stop reason: HOSPADM

## 2022-04-20 RX ORDER — NALOXONE HCL 0.4 MG/ML
0.4 VIAL (ML) INJECTION AS NEEDED
Status: DISCONTINUED | OUTPATIENT
Start: 2022-04-20 | End: 2022-04-20 | Stop reason: HOSPADM

## 2022-04-20 RX ORDER — AMLODIPINE BESYLATE 2.5 MG/1
2.5 TABLET ORAL 2 TIMES DAILY
Status: DISCONTINUED | OUTPATIENT
Start: 2022-04-20 | End: 2022-04-21 | Stop reason: HOSPADM

## 2022-04-20 RX ORDER — BISACODYL 10 MG
10 SUPPOSITORY, RECTAL RECTAL DAILY PRN
Status: DISCONTINUED | OUTPATIENT
Start: 2022-04-20 | End: 2022-04-21 | Stop reason: HOSPADM

## 2022-04-20 RX ORDER — DEXTROSE MONOHYDRATE 25 G/50ML
25 INJECTION, SOLUTION INTRAVENOUS
Status: DISCONTINUED | OUTPATIENT
Start: 2022-04-20 | End: 2022-04-21 | Stop reason: HOSPADM

## 2022-04-20 RX ORDER — AMOXICILLIN 250 MG
2 CAPSULE ORAL 2 TIMES DAILY PRN
Status: DISCONTINUED | OUTPATIENT
Start: 2022-04-20 | End: 2022-04-21 | Stop reason: HOSPADM

## 2022-04-20 RX ORDER — ONDANSETRON 2 MG/ML
4 INJECTION INTRAMUSCULAR; INTRAVENOUS ONCE AS NEEDED
Status: DISCONTINUED | OUTPATIENT
Start: 2022-04-20 | End: 2022-04-20 | Stop reason: HOSPADM

## 2022-04-20 RX ORDER — PROTAMINE SULFATE 10 MG/ML
INJECTION, SOLUTION INTRAVENOUS AS NEEDED
Status: DISCONTINUED | OUTPATIENT
Start: 2022-04-20 | End: 2022-04-20 | Stop reason: SURG

## 2022-04-20 RX ORDER — DIPHENHYDRAMINE HCL 25 MG
25 CAPSULE ORAL DAILY PRN
Status: DISCONTINUED | OUTPATIENT
Start: 2022-04-20 | End: 2022-04-21 | Stop reason: HOSPADM

## 2022-04-20 RX ORDER — EPHEDRINE SULFATE 50 MG/ML
INJECTION, SOLUTION INTRAVENOUS AS NEEDED
Status: DISCONTINUED | OUTPATIENT
Start: 2022-04-20 | End: 2022-04-20 | Stop reason: SURG

## 2022-04-20 RX ORDER — PROMETHAZINE HYDROCHLORIDE 25 MG/1
25 SUPPOSITORY RECTAL ONCE AS NEEDED
Status: DISCONTINUED | OUTPATIENT
Start: 2022-04-20 | End: 2022-04-20 | Stop reason: HOSPADM

## 2022-04-20 RX ORDER — SODIUM CHLORIDE 9 MG/ML
75 INJECTION, SOLUTION INTRAVENOUS CONTINUOUS
Status: DISCONTINUED | OUTPATIENT
Start: 2022-04-20 | End: 2022-04-21 | Stop reason: HOSPADM

## 2022-04-20 RX ORDER — DILTIAZEM HYDROCHLORIDE 180 MG/1
180 CAPSULE, COATED, EXTENDED RELEASE ORAL NIGHTLY
Status: DISCONTINUED | OUTPATIENT
Start: 2022-04-20 | End: 2022-04-21 | Stop reason: HOSPADM

## 2022-04-20 RX ORDER — FAMOTIDINE 40 MG/1
40 TABLET, FILM COATED ORAL DAILY
Status: DISCONTINUED | OUTPATIENT
Start: 2022-04-20 | End: 2022-04-21 | Stop reason: HOSPADM

## 2022-04-20 RX ORDER — PROMETHAZINE HYDROCHLORIDE 25 MG/1
25 TABLET ORAL ONCE AS NEEDED
Status: DISCONTINUED | OUTPATIENT
Start: 2022-04-20 | End: 2022-04-20 | Stop reason: HOSPADM

## 2022-04-20 RX ORDER — CLOPIDOGREL BISULFATE 75 MG/1
75 TABLET ORAL NIGHTLY
Status: DISCONTINUED | OUTPATIENT
Start: 2022-04-20 | End: 2022-04-21 | Stop reason: HOSPADM

## 2022-04-20 RX ORDER — ONDANSETRON 2 MG/ML
4 INJECTION INTRAMUSCULAR; INTRAVENOUS EVERY 6 HOURS PRN
Status: DISCONTINUED | OUTPATIENT
Start: 2022-04-20 | End: 2022-04-21 | Stop reason: HOSPADM

## 2022-04-20 RX ORDER — MIDAZOLAM HYDROCHLORIDE 1 MG/ML
INJECTION INTRAMUSCULAR; INTRAVENOUS AS NEEDED
Status: DISCONTINUED | OUTPATIENT
Start: 2022-04-20 | End: 2022-04-20 | Stop reason: SURG

## 2022-04-20 RX ORDER — ACETAMINOPHEN 650 MG/1
650 SUPPOSITORY RECTAL ONCE AS NEEDED
Status: DISCONTINUED | OUTPATIENT
Start: 2022-04-20 | End: 2022-04-20 | Stop reason: HOSPADM

## 2022-04-20 RX ORDER — RANOLAZINE 500 MG/1
500 TABLET, EXTENDED RELEASE ORAL 2 TIMES DAILY
Status: DISCONTINUED | OUTPATIENT
Start: 2022-04-20 | End: 2022-04-21 | Stop reason: HOSPADM

## 2022-04-20 RX ORDER — NITROGLYCERIN 20 MG/100ML
5-200 INJECTION INTRAVENOUS
Status: DISCONTINUED | OUTPATIENT
Start: 2022-04-20 | End: 2022-04-21 | Stop reason: HOSPADM

## 2022-04-20 RX ORDER — PREDNISONE 10 MG/1
10 TABLET ORAL
Status: DISCONTINUED | OUTPATIENT
Start: 2022-04-20 | End: 2022-04-21 | Stop reason: HOSPADM

## 2022-04-20 RX ORDER — SODIUM CHLORIDE 9 MG/ML
INJECTION, SOLUTION INTRAVENOUS CONTINUOUS PRN
Status: DISCONTINUED | OUTPATIENT
Start: 2022-04-20 | End: 2022-04-20 | Stop reason: SURG

## 2022-04-20 RX ORDER — ONDANSETRON 4 MG/1
4 TABLET, FILM COATED ORAL EVERY 6 HOURS PRN
Status: DISCONTINUED | OUTPATIENT
Start: 2022-04-20 | End: 2022-04-21 | Stop reason: HOSPADM

## 2022-04-20 RX ORDER — NEOSTIGMINE METHYLSULFATE 1 MG/ML
INJECTION, SOLUTION INTRAVENOUS AS NEEDED
Status: DISCONTINUED | OUTPATIENT
Start: 2022-04-20 | End: 2022-04-20 | Stop reason: SURG

## 2022-04-20 RX ORDER — PHENYLEPHRINE HYDROCHLORIDE 10 MG/ML
INJECTION INTRAVENOUS AS NEEDED
Status: DISCONTINUED | OUTPATIENT
Start: 2022-04-20 | End: 2022-04-20 | Stop reason: SURG

## 2022-04-20 RX ORDER — ACETAMINOPHEN 325 MG/1
650 TABLET ORAL ONCE AS NEEDED
Status: DISCONTINUED | OUTPATIENT
Start: 2022-04-20 | End: 2022-04-20 | Stop reason: HOSPADM

## 2022-04-20 RX ORDER — ROCURONIUM BROMIDE 10 MG/ML
INJECTION, SOLUTION INTRAVENOUS AS NEEDED
Status: DISCONTINUED | OUTPATIENT
Start: 2022-04-20 | End: 2022-04-20 | Stop reason: SURG

## 2022-04-20 RX ORDER — SUCCINYLCHOLINE CHLORIDE 20 MG/ML
INJECTION INTRAMUSCULAR; INTRAVENOUS AS NEEDED
Status: DISCONTINUED | OUTPATIENT
Start: 2022-04-20 | End: 2022-04-20 | Stop reason: SURG

## 2022-04-20 RX ORDER — DULOXETIN HYDROCHLORIDE 30 MG/1
30 CAPSULE, DELAYED RELEASE ORAL NIGHTLY
Status: DISCONTINUED | OUTPATIENT
Start: 2022-04-20 | End: 2022-04-21 | Stop reason: HOSPADM

## 2022-04-20 RX ORDER — FLUMAZENIL 0.1 MG/ML
INJECTION INTRAVENOUS AS NEEDED
Status: DISCONTINUED | OUTPATIENT
Start: 2022-04-20 | End: 2022-04-20 | Stop reason: SURG

## 2022-04-20 RX ORDER — ALBUTEROL SULFATE 2.5 MG/3ML
2.5 SOLUTION RESPIRATORY (INHALATION)
Status: DISCONTINUED | OUTPATIENT
Start: 2022-04-20 | End: 2022-04-21 | Stop reason: HOSPADM

## 2022-04-20 RX ORDER — ALBUTEROL SULFATE 2.5 MG/3ML
2.5 SOLUTION RESPIRATORY (INHALATION) EVERY 4 HOURS PRN
Status: DISCONTINUED | OUTPATIENT
Start: 2022-04-20 | End: 2022-04-21 | Stop reason: HOSPADM

## 2022-04-20 RX ORDER — FLUMAZENIL 0.1 MG/ML
0.2 INJECTION INTRAVENOUS AS NEEDED
Status: DISCONTINUED | OUTPATIENT
Start: 2022-04-20 | End: 2022-04-20 | Stop reason: HOSPADM

## 2022-04-20 RX ORDER — BUDESONIDE AND FORMOTEROL FUMARATE DIHYDRATE 160; 4.5 UG/1; UG/1
2 AEROSOL RESPIRATORY (INHALATION)
Status: DISCONTINUED | OUTPATIENT
Start: 2022-04-20 | End: 2022-04-21 | Stop reason: HOSPADM

## 2022-04-20 RX ORDER — FENTANYL CITRATE 50 UG/ML
INJECTION, SOLUTION INTRAMUSCULAR; INTRAVENOUS AS NEEDED
Status: DISCONTINUED | OUTPATIENT
Start: 2022-04-20 | End: 2022-04-20 | Stop reason: SURG

## 2022-04-20 RX ORDER — HEPARIN SODIUM 1000 [USP'U]/ML
INJECTION, SOLUTION INTRAVENOUS; SUBCUTANEOUS AS NEEDED
Status: DISCONTINUED | OUTPATIENT
Start: 2022-04-20 | End: 2022-04-20 | Stop reason: SURG

## 2022-04-20 RX ORDER — DIPHENHYDRAMINE HYDROCHLORIDE 50 MG/ML
12.5 INJECTION INTRAMUSCULAR; INTRAVENOUS
Status: DISCONTINUED | OUTPATIENT
Start: 2022-04-20 | End: 2022-04-20 | Stop reason: HOSPADM

## 2022-04-20 RX ORDER — CLINDAMYCIN PHOSPHATE 900 MG/50ML
900 INJECTION INTRAVENOUS ONCE
Status: COMPLETED | OUTPATIENT
Start: 2022-04-20 | End: 2022-04-20

## 2022-04-20 RX ORDER — SODIUM CHLORIDE 9 MG/ML
100 INJECTION, SOLUTION INTRAVENOUS CONTINUOUS
Status: DISCONTINUED | OUTPATIENT
Start: 2022-04-20 | End: 2022-04-20

## 2022-04-20 RX ORDER — ATORVASTATIN CALCIUM 20 MG/1
20 TABLET, FILM COATED ORAL NIGHTLY
Status: DISCONTINUED | OUTPATIENT
Start: 2022-04-21 | End: 2022-04-21 | Stop reason: HOSPADM

## 2022-04-20 RX ORDER — ACETAMINOPHEN 325 MG/1
650 TABLET ORAL EVERY 4 HOURS PRN
Status: DISCONTINUED | OUTPATIENT
Start: 2022-04-20 | End: 2022-04-21 | Stop reason: HOSPADM

## 2022-04-20 RX ORDER — EPHEDRINE SULFATE 50 MG/ML
5 INJECTION, SOLUTION INTRAVENOUS ONCE AS NEEDED
Status: DISCONTINUED | OUTPATIENT
Start: 2022-04-20 | End: 2022-04-20 | Stop reason: HOSPADM

## 2022-04-20 RX ADMIN — EPHEDRINE SULFATE 10 MG: 50 INJECTION INTRAVENOUS at 07:34

## 2022-04-20 RX ADMIN — PROPOFOL 150 MG: 10 INJECTION, EMULSION INTRAVENOUS at 07:16

## 2022-04-20 RX ADMIN — MIDAZOLAM HYDROCHLORIDE 1 MG: 1 INJECTION, SOLUTION INTRAMUSCULAR; INTRAVENOUS at 07:06

## 2022-04-20 RX ADMIN — ASPIRIN 325 MG: 325 TABLET ORAL at 20:49

## 2022-04-20 RX ADMIN — IPRATROPIUM BROMIDE 0.5 MG: 0.5 SOLUTION RESPIRATORY (INHALATION) at 22:21

## 2022-04-20 RX ADMIN — GLYCOPYRROLATE 0.2 MG: 0.2 INJECTION, SOLUTION INTRAMUSCULAR; INTRAVITREAL at 08:17

## 2022-04-20 RX ADMIN — SUCCINYLCHOLINE CHLORIDE 200 MG: 20 INJECTION, SOLUTION INTRAMUSCULAR; INTRAVENOUS at 07:16

## 2022-04-20 RX ADMIN — FLUMAZENIL 0.2 MG: 0.1 INJECTION, SOLUTION INTRAVENOUS at 09:36

## 2022-04-20 RX ADMIN — CLINDAMYCIN PHOSPHATE 900 MG: 900 INJECTION, SOLUTION INTRAVENOUS at 07:24

## 2022-04-20 RX ADMIN — EPHEDRINE SULFATE 10 MG: 50 INJECTION INTRAVENOUS at 07:46

## 2022-04-20 RX ADMIN — CLINDAMYCIN PHOSPHATE 900 MG: 900 INJECTION, SOLUTION INTRAVENOUS at 14:56

## 2022-04-20 RX ADMIN — IPRATROPIUM BROMIDE 0.5 MG: 0.5 SOLUTION RESPIRATORY (INHALATION) at 15:17

## 2022-04-20 RX ADMIN — HEPARIN SODIUM 8000 UNITS: 1000 INJECTION, SOLUTION INTRAVENOUS; SUBCUTANEOUS at 08:08

## 2022-04-20 RX ADMIN — SODIUM CHLORIDE 75 ML/HR: 9 INJECTION, SOLUTION INTRAVENOUS at 11:08

## 2022-04-20 RX ADMIN — ROCURONIUM BROMIDE 10 MG: 10 INJECTION INTRAVENOUS at 07:16

## 2022-04-20 RX ADMIN — ROCURONIUM BROMIDE 30 MG: 10 INJECTION INTRAVENOUS at 07:26

## 2022-04-20 RX ADMIN — GLYCOPYRROLATE 0.2 MG: 0.2 INJECTION, SOLUTION INTRAMUSCULAR; INTRAVITREAL at 08:04

## 2022-04-20 RX ADMIN — HUMAN INSULIN 42 UNITS: 100 INJECTION, SUSPENSION SUBCUTANEOUS at 21:18

## 2022-04-20 RX ADMIN — AMLODIPINE BESYLATE 2.5 MG: 2.5 TABLET ORAL at 20:49

## 2022-04-20 RX ADMIN — SODIUM CHLORIDE: 9 INJECTION, SOLUTION INTRAVENOUS at 09:11

## 2022-04-20 RX ADMIN — PROTAMINE SULFATE 20 MG: 10 INJECTION, SOLUTION INTRAVENOUS at 08:56

## 2022-04-20 RX ADMIN — PHENYLEPHRINE HYDROCHLORIDE 100 MCG: 10 INJECTION, SOLUTION INTRAVENOUS at 07:27

## 2022-04-20 RX ADMIN — SODIUM CHLORIDE 100 ML/HR: 9 INJECTION, SOLUTION INTRAVENOUS at 06:25

## 2022-04-20 RX ADMIN — GLYCOPYRROLATE 0.8 MG: 0.2 INJECTION, SOLUTION INTRAMUSCULAR; INTRAVITREAL at 09:10

## 2022-04-20 RX ADMIN — SODIUM CHLORIDE: 9 INJECTION, SOLUTION INTRAVENOUS at 07:20

## 2022-04-20 RX ADMIN — ALBUTEROL SULFATE 2.5 MG: 2.5 SOLUTION RESPIRATORY (INHALATION) at 15:17

## 2022-04-20 RX ADMIN — DILTIAZEM HYDROCHLORIDE 180 MG: 180 CAPSULE, COATED, EXTENDED RELEASE ORAL at 20:49

## 2022-04-20 RX ADMIN — LIDOCAINE HYDROCHLORIDE 100 MG: 20 INJECTION, SOLUTION INFILTRATION; PERINEURAL at 07:16

## 2022-04-20 RX ADMIN — ROCURONIUM BROMIDE 10 MG: 10 INJECTION INTRAVENOUS at 08:20

## 2022-04-20 RX ADMIN — FENTANYL CITRATE 50 MCG: 50 INJECTION INTRAMUSCULAR; INTRAVENOUS at 07:13

## 2022-04-20 RX ADMIN — ALBUTEROL SULFATE 2.5 MG: 2.5 SOLUTION RESPIRATORY (INHALATION) at 22:21

## 2022-04-20 RX ADMIN — GLYCOPYRROLATE 0.2 MG: 0.2 INJECTION, SOLUTION INTRAMUSCULAR; INTRAVITREAL at 07:53

## 2022-04-20 RX ADMIN — SUGAMMADEX 200 MG: 100 INJECTION, SOLUTION INTRAVENOUS at 09:21

## 2022-04-20 RX ADMIN — NEOSTIGMINE METHYLSULFATE 4 MG: 0.5 INJECTION INTRAVENOUS at 09:10

## 2022-04-20 RX ADMIN — CLINDAMYCIN PHOSPHATE 900 MG: 900 INJECTION, SOLUTION INTRAVENOUS at 23:00

## 2022-04-20 RX ADMIN — PHENYLEPHRINE HYDROCHLORIDE 0.5 MCG/KG/MIN: 10 INJECTION INTRAVENOUS at 07:46

## 2022-04-20 RX ADMIN — MIDAZOLAM HYDROCHLORIDE 1 MG: 1 INJECTION, SOLUTION INTRAMUSCULAR; INTRAVENOUS at 07:12

## 2022-04-20 RX ADMIN — SODIUM CHLORIDE 75 ML/HR: 9 INJECTION, SOLUTION INTRAVENOUS at 23:04

## 2022-04-20 RX ADMIN — ROCURONIUM BROMIDE 10 MG: 10 INJECTION INTRAVENOUS at 08:02

## 2022-04-20 RX ADMIN — CLOPIDOGREL BISULFATE 75 MG: 75 TABLET ORAL at 20:49

## 2022-04-20 RX ADMIN — HUMAN INSULIN 22 UNITS: 100 INJECTION, SOLUTION SUBCUTANEOUS at 21:18

## 2022-04-20 RX ADMIN — PROPOFOL 50 MG: 10 INJECTION, EMULSION INTRAVENOUS at 07:17

## 2022-04-20 RX ADMIN — DULOXETINE HYDROCHLORIDE 30 MG: 30 CAPSULE, DELAYED RELEASE ORAL at 20:49

## 2022-04-20 RX ADMIN — ONDANSETRON 4 MG: 2 INJECTION INTRAMUSCULAR; INTRAVENOUS at 07:16

## 2022-04-20 RX ADMIN — ROCURONIUM BROMIDE 10 MG: 10 INJECTION INTRAVENOUS at 08:46

## 2022-04-20 RX ADMIN — RANOLAZINE 500 MG: 500 TABLET, FILM COATED, EXTENDED RELEASE ORAL at 20:49

## 2022-04-20 NOTE — ANESTHESIA POSTPROCEDURE EVALUATION
Patient: Paddy Brantley    Procedure Summary     Date: 04/20/22 Room / Location: [facility] OR 05 / [facility] OR    Anesthesia Start: 0707 Anesthesia Stop: 0942    Procedure: LEFT TRANSCAROTID ARTERY REVASCULARIZATION CAROTID CEREBRAL ARTERIOGRAM POSSIBLE ENDARTERECTOMY                  (C-ARM#2 AND C-ARM TABLE) (Left Neck) Diagnosis:       Carotid stenosis, asymptomatic, bilateral      (Carotid stenosis, asymptomatic, bilateral [I65.23])    Surgeons: Homero Sutton MD Provider: Annia Washington DO    Anesthesia Type: general ASA Status: 4          Anesthesia Type: general    Vitals  No vitals data found for the desired time range.          Post Anesthesia Care and Evaluation    Patient location during evaluation: PACU  Patient participation: complete - patient participated  Level of consciousness: sleepy but conscious  Pain score: 0  Pain management: adequate  Airway patency: patent  Anesthetic complications: No anesthetic complications  PONV Status: none  Cardiovascular status: acceptable and hemodynamically stable  Respiratory status: acceptable, face mask and spontaneous ventilation  Hydration status: acceptable    Comments: ---------------------------               04/20/22 04/20/2022      ---------------------------   BP:          134/62         Pulse:         77           Resp:          12          Temp:   97.1 °F (36.2 °C)   SpO2:         99%       ---------------------------

## 2022-04-20 NOTE — ANESTHESIA PROCEDURE NOTES
Airway  Urgency: elective    Date/Time: 4/20/2022 7:18 AM  Airway not difficult    General Information and Staff    Patient location during procedure: OR  CRNA: Aliza Gould CRNA  SRNA: Shiva Obando SRNA  Indications and Patient Condition  Indications for airway management: airway protection    Preoxygenated: yes  Mask difficulty assessment: 0 - not attempted    Final Airway Details  Final airway type: endotracheal airway      Successful airway: ETT  Cuffed: yes   Successful intubation technique: video laryngoscopy  Facilitating devices/methods: intubating stylet  Endotracheal tube insertion site: oral  Blade: [place]  Blade size: 3  ETT size (mm): 7.5  Cormack-Lehane Classification: grade I - full view of glottis  Placement verified by: chest auscultation and capnometry   Cuff volume (mL): 7  Measured from: lips  ETT/EBT  to lips (cm): 22  Number of attempts at approach: 1  Assessment: lips, teeth, and gum same as pre-op and atraumatic intubation    Additional Comments  By shiva ledbetter

## 2022-04-20 NOTE — ANESTHESIA PROCEDURE NOTES
Peripheral IV    Patient location during procedure: OR  Start time: 4/20/2022 7:18 AM  End time: 4/20/2022 7:20 AM  Line placed for Fluids/Medication Admin.  Performed By   Anesthesiologist: Annia Washington DO  Preanesthetic Checklist  Completed: patient identified, IV checked, site marked, risks and benefits discussed, surgical consent, monitors and equipment checked, pre-op evaluation and timeout performed  Peripheral IV Prep   Patient position: supine   Prep: alcohol swabs and ChloraPrep  Patient monitoring: heart rate, cardiac monitor and continuous pulse ox  Peripheral IV Procedure   Laterality:left  Location:  Antecubital  Catheter size: 18 G  Guidance: ultrasound guided         Post Assessment   Dressing Type: transparent and tape.    IV Dressing/Site: clean, dry and intact

## 2022-04-20 NOTE — ANESTHESIA PREPROCEDURE EVALUATION
Anesthesia Evaluation     Patient summary reviewed and Nursing notes reviewed   no history of anesthetic complications:  NPO Solid Status: > 8 hours  NPO Liquid Status: > 2 hours           Airway   Mallampati: III  TM distance: <3 FB  Neck ROM: full  possible difficult intubation  Dental    (+) poor dentation    Pulmonary     breath sounds clear to auscultation  (+) pneumonia ( 2/28/17 left sided pleural effusion. 10/12/17 Linear atelectasis left lung base. Lungs clear on exam 12/19/17.) resolved , a smoker Former, COPD moderate, shortness of breath, decreased breath sounds,   (-) asthma, rhonchi, no home oxygen    ROS comment: Left lower lobe/resolved.  Cardiovascular - normal exam  Exercise tolerance: poor (<4 METS)    ECG reviewed  PT is on anticoagulation therapy  Rhythm: regular  Rate: normal    (+) hypertension well controlled less than 2 medications, valvular problems/murmurs MR, CAD, CABG >6 Months, cardiac stents Drug eluting stent more than 12 months ago dysrhythmias PVC, PVD, hyperlipidemia,  carotid artery disease carotid bilateral  (-) pacemaker, past MI, angina, CHF, murmur, DVT    ROS comment: EKG 4/14/2022:  Normal sinus rhythm  Nonspecific T wave abnormality  Abnormal ECG  When compared with ECG of 01-JUL-2021 15:55,  No significant change was found    TTE 6/7/2019:  · The study is technically difficult for diagnosis.  · The left ventricular cavity is borderline dilated.  · Estimated EF = 53%.  · Left ventricular systolic function is normal.        Neuro/Psych- negative ROS  (-) seizures, TIA, CVA  GI/Hepatic/Renal/Endo    (+) obesity,  GERD well controlled,  renal disease CRI and stones, diabetes mellitus type 2 poorly controlled using insulin,   (-) morbid obesity    Musculoskeletal (-) negative ROS        ROS comment: Previous lumbar discectomy.  Abdominal   (+) obese,    Substance History - negative use     OB/GYN negative ob/gyn ROS         Other - negative ROS       ROS/Med Hx Other:  BMI=33.9  Hgb=16.6    Last ZwbC3I=2.6    Plavix last taken 4/19/2022 11/2016: Carotid duplex: BICA <50%   3/2022 Carotid duplex:  RENU 50-69% (158/46cm/s, ratio 1.6), LICA >70% (345cm/s, ratio 2.1).  Antegrade verts.  3/2022 CTA Carotids:  RENU 60%, LICA 80%      11/2016 echocardiogram: EF 50-55%, mild MAC, trace MR, diastolic dysfunction  11/2016 Cardiac Catheterization: 70% LAD, 70% circumflex, 80% RCA, 70% diagonal  11/2016 CABG x4 LIMA-LAD, SVG-OM, SVG-DX, SVG-PDA, EVH  12/2017 Sternal revision and Repair with Sternal Plates.   3/2019 CXR:  Broken 4th place distal sternum  7/2019 CT Chest:  First 3 sternal plates intact.  Lower sternal separation with broken plate.  No change or migration of hardware.  10/2020 Stress Test:  EF 65%, no ischemia.  Low risk study.    Hx of COPD: uses albuterol inhaler a couple times a month. Spiriva used last night. Advair at night.       Phys Exam Other: ETT hx:  Successful airway: ETT  Cuffed: yes   Successful intubation technique: direct laryngoscopy  Facilitating devices/methods: intubating stylet  Endotracheal tube insertion site: oral  Blade: Jd  Blade size: #3  ETT size: 8.0 mm  Cormack-Lehane Classification: grade IIa - partial view of glottis  Placement verified by: chest auscultation and capnometry   Cuff volume (mL): 10  Measured from: lips  ETT to lips (cm): 21  Number of attempts at approach: 1                Anesthesia Plan    ASA 4     general   (Mathew on induction    Discussed arterial line & another IV and patient understands possible complications, risks, & agrees.Allergic to dexamethasone (hiccups)    Started on prednisone 50mg due to allergy to contrast dye. Started on Tuesday 4/19/2022. Pt took dose this AM. Pt also took benadryl this AM.)  intravenous induction     Anesthetic plan, all risks, benefits, and alternatives have been provided, discussed and informed consent has been obtained with: patient.

## 2022-04-20 NOTE — ANESTHESIA PROCEDURE NOTES
Arterial Line    Pre-sedation assessment completed: 4/20/2022 7:12 AM    Patient reassessed immediately prior to procedure    Patient location during procedure: OR  Start time: 4/20/2022 7:20 AM  Stop Time:4/20/2022 7:26 AM       Line placed for hemodynamic monitoring.  Performed By   Anesthesiologist: Annia Washington DOSRNA: Bianca Obando SRNA  Preanesthetic Checklist  Completed: patient identified, IV checked, site marked, risks and benefits discussed, surgical consent, monitors and equipment checked, pre-op evaluation and timeout performed  Arterial Line Prep   Sterile Tech: cap, gloves and mask  Prep: alcohol swabs and ChloraPrep  Patient monitoring: blood pressure monitoring, continuous pulse oximetry and EKG  Arterial Line Procedure   Laterality:right  Location:  radial artery  Catheter size: 20 G   Guidance: ultrasound guided  Number of attempts: 1  Successful placement: yes  Post Assessment   Dressing Type: secured with tape, occlusive dressing applied and wrist guard applied.   Complications no  Circ/Move/Sens Assessment: unchanged.   Patient Tolerance: patient tolerated the procedure well with no apparent complications

## 2022-04-21 VITALS
DIASTOLIC BLOOD PRESSURE: 47 MMHG | BODY MASS INDEX: 34.74 KG/M2 | TEMPERATURE: 97.7 F | SYSTOLIC BLOOD PRESSURE: 90 MMHG | RESPIRATION RATE: 16 BRPM | OXYGEN SATURATION: 98 % | WEIGHT: 229.2 LBS | HEIGHT: 68 IN | HEART RATE: 55 BPM

## 2022-04-21 LAB — GLUCOSE BLDC GLUCOMTR-MCNC: 106 MG/DL (ref 70–130)

## 2022-04-21 PROCEDURE — 94760 N-INVAS EAR/PLS OXIMETRY 1: CPT

## 2022-04-21 PROCEDURE — 63710000001 PREDNISONE PER 5 MG: Performed by: THORACIC SURGERY (CARDIOTHORACIC VASCULAR SURGERY)

## 2022-04-21 PROCEDURE — 94799 UNLISTED PULMONARY SVC/PX: CPT

## 2022-04-21 PROCEDURE — 63710000001 INSULIN REGULAR HUMAN PER 5 UNITS: Performed by: THORACIC SURGERY (CARDIOTHORACIC VASCULAR SURGERY)

## 2022-04-21 PROCEDURE — 99024 POSTOP FOLLOW-UP VISIT: CPT | Performed by: NURSE PRACTITIONER

## 2022-04-21 PROCEDURE — 82962 GLUCOSE BLOOD TEST: CPT

## 2022-04-21 PROCEDURE — 94664 DEMO&/EVAL PT USE INHALER: CPT

## 2022-04-21 PROCEDURE — 63710000001 INSULIN ISOPHANE HUMAN PER 5 UNITS: Performed by: THORACIC SURGERY (CARDIOTHORACIC VASCULAR SURGERY)

## 2022-04-21 RX ORDER — AMOXICILLIN 250 MG
2 CAPSULE ORAL 2 TIMES DAILY PRN
Start: 2022-04-21

## 2022-04-21 RX ORDER — METHYLPREDNISOLONE 4 MG/1
TABLET ORAL
Qty: 21 TABLET | Refills: 0 | Status: SHIPPED | OUTPATIENT
Start: 2022-04-21 | End: 2022-04-29

## 2022-04-21 RX ADMIN — ALBUTEROL SULFATE 2.5 MG: 2.5 SOLUTION RESPIRATORY (INHALATION) at 08:02

## 2022-04-21 RX ADMIN — PREDNISONE 10 MG: 10 TABLET ORAL at 08:17

## 2022-04-21 RX ADMIN — HUMAN INSULIN 22 UNITS: 100 INJECTION, SOLUTION SUBCUTANEOUS at 08:16

## 2022-04-21 RX ADMIN — RANOLAZINE 500 MG: 500 TABLET, FILM COATED, EXTENDED RELEASE ORAL at 08:16

## 2022-04-21 RX ADMIN — ASPIRIN 325 MG: 325 TABLET ORAL at 08:16

## 2022-04-21 RX ADMIN — HUMAN INSULIN 42 UNITS: 100 INJECTION, SUSPENSION SUBCUTANEOUS at 06:54

## 2022-04-21 RX ADMIN — IPRATROPIUM BROMIDE 0.5 MG: 0.5 SOLUTION RESPIRATORY (INHALATION) at 08:03

## 2022-04-22 ENCOUNTER — READMISSION MANAGEMENT (OUTPATIENT)
Dept: CALL CENTER | Facility: HOSPITAL | Age: 62
End: 2022-04-22

## 2022-04-22 NOTE — OUTREACH NOTE
Prep Survey    Flowsheet Row Responses   [facility] facility patient discharged from? [place]   Is LACE score < 7 ? No   Emergency Room discharge w/ pulse ox? No   Eligibility Readm Mgmt   Discharge diagnosis Carotid stenosis s/p LEFT TRANSCAROTID ARTERY REVASCULARIZATION, CAROTID CEREBRAL ARTERIOGRAM    Does the patient have one of the following disease processes/diagnoses(primary or secondary)? Other   Does the patient have Home health ordered? No   Is there a DME ordered? No   Prep survey completed? Yes          NADIRA AUGUSTINE - Registered Nurse

## 2022-04-25 RX ORDER — ATORVASTATIN CALCIUM 20 MG/1
20 TABLET, FILM COATED ORAL DAILY
Qty: 90 TABLET | Refills: 2 | Status: SHIPPED | OUTPATIENT
Start: 2022-04-25 | End: 2022-05-27

## 2022-04-26 ENCOUNTER — READMISSION MANAGEMENT (OUTPATIENT)
Dept: CALL CENTER | Facility: HOSPITAL | Age: 62
End: 2022-04-26

## 2022-04-26 NOTE — OUTREACH NOTE
Medical Week 1 Survey    Flowsheet Row Responses   [facility] facility patient discharged from? [place]   Does the patient have one of the following disease processes/diagnoses(primary or secondary)? Other   Week 1 attempt successful? No   Unsuccessful attempts Attempt 1          RONNIE BROWN - Registered Nurse

## 2022-04-29 ENCOUNTER — OFFICE VISIT (OUTPATIENT)
Dept: CARDIAC SURGERY | Facility: CLINIC | Age: 62
End: 2022-04-29

## 2022-04-29 VITALS
DIASTOLIC BLOOD PRESSURE: 69 MMHG | TEMPERATURE: 97.7 F | SYSTOLIC BLOOD PRESSURE: 130 MMHG | BODY MASS INDEX: 36.1 KG/M2 | HEART RATE: 69 BPM | HEIGHT: 67 IN | WEIGHT: 230 LBS | OXYGEN SATURATION: 98 %

## 2022-04-29 DIAGNOSIS — E78.2 MIXED HYPERLIPIDEMIA: ICD-10-CM

## 2022-04-29 DIAGNOSIS — Z48.812 SURGICAL AFTERCARE, CIRCULATORY SYSTEM: ICD-10-CM

## 2022-04-29 DIAGNOSIS — I65.23 BILATERAL CAROTID ARTERY STENOSIS: Primary | ICD-10-CM

## 2022-04-29 PROCEDURE — 99024 POSTOP FOLLOW-UP VISIT: CPT | Performed by: NURSE PRACTITIONER

## 2022-05-02 ENCOUNTER — READMISSION MANAGEMENT (OUTPATIENT)
Dept: CALL CENTER | Facility: HOSPITAL | Age: 62
End: 2022-05-02

## 2022-05-02 NOTE — OUTREACH NOTE
Medical Week 1 Survey    Flowsheet Row Responses   [facility] patient discharged from? [place]   Does the patient have one of the following disease processes/diagnoses(primary or secondary)? Other   Week 1 attempt successful? No   Unsuccessful attempts Attempt 2          LINDA AGRAWAL - Registered Nurse

## 2022-05-03 NOTE — PROGRESS NOTES
5/3/2022    Paddy Brantley  1960    Chief Complaint:    Chief Complaint   Patient presents with   • Carotid Artery Disease       HPI:      PCP:  Kwaku Iniguez,   Cardiology: Dr. Resendez     61y.o. male with HTN(uncontrolled, increased risk stroke, rupture), Diabetes Mellitus(stable, increased risk cardiovascular events), Obesity(uncontrolled, increased risk cardiovascular events), Smoker(uncontrolled, increased risk cardiovascular events), COPD(stable, increased risk pulmonary complications) and Chronic Kidney Disease(stable, increased risk renal failure) CAD(stable, increase risk cardiovascular events).  smokes 1 PPD. moderate leg pain walking 50ft x4yrs, worse past 6 months. Worse walking incline. 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 fall in which his sternum made first contact with the ground followed by his head, chest x-ray revealed fracture of sternal plating at the base of the sternum, he has significant pain, reports inability to perform job duties effectively due to his pain.  Off 6 weeks from work, feels unable to return due to pain intolerance. Pain has since improved, feels lower sternal area pop/click.  Underwent recent TCAR no issues, returns today in post operative follow up.  No other associated signs, symptoms or modifying factors.     11/2016: Carotid duplex: BICA <50%   3/2022 Carotid duplex:  RENU 50-69% (158/46cm/s, ratio 1.6), LICA >70% (345cm/s, ratio 2.1).  Antegrade verts.  3/2022 CTA Carotids:  RENU 60%, LICA 80%  4/2022 L TCAR     12/2020 DAISHA:  RIGHT .71 biphasic. , LEFT .59 biphasic.  2/2021 CTA Aorta runoff:  RIGHT SFA 80%, 3v runoff.  LEFT common iliac 90/70%, 3v runoff.  3/2021 Aortagram: Bilateral PEGGY stent (kissing), R SFA PTA  4/2021 DAISHA: Right 0.87 triphasic.  Left 0.94 triphasic.  6/2/21: DAISHA: RIGHT 0.8 Triphasic LEFT 0.93 Triphasic   9/7/21: DAISHA:  RIGHT 0.84 Triphasic LEFT 0.93 Triphasic    3/2022: DAISHA: RIGHT 0.92 Triphasic LEFT 1.0 Triphasic     11/2016 echocardiogram: EF 50-55%, mild MAC, trace MR, diastolic dysfunction  11/2016 Cardiac Catheterization: 70% LAD, 70% circumflex, 80% RCA, 70% diagonal  11/2016 CABG x4 LIMA-LAD, SVG-OM, SVG-DX, SVG-PDA, EVH  12/2017 Sternal revision and Repair with Sternal Plates.   3/2019 CXR:  Broken 4th place distal sternum  7/2019 CT Chest:  First 3 sternal plates intact.  Lower sternal separation with broken plate.  No change or migration of hardware.  10/2020 Stress Test:  EF 65%, no ischemia.  Low risk study.      The following portions of the patient's history were reviewed and updated as appropriate: allergies, current medications, past family history, past medical history, past social history, past surgical history and problem list.  Recent images independently reviewed.  Available laboratory values reviewed.    PMH:  Past Medical History:   Diagnosis Date   • Accident caused by hypodermic needle    • Guerra esophagus    • Contact with and (suspected) exposure to potentially hazardous body fluids    • Diabetes mellitus (HCC)    • GERD (gastroesophageal reflux disease)    • Kidney stones    • Meniere's disease    • MRSA infection     right ing hernia   • PAD (peripheral artery disease) (HCC)      Past Surgical History:   Procedure Laterality Date   • APPENDECTOMY     • ARTERIOGRAM N/A 4/1/2021    Procedure: lower extremity Arteriogram possible angioplasty stent atherectomy thrombolysis;  Surgeon: Homero Sutton MD;  Location: [facility]GIO INVASIVE LOCATION;  Service: Interventional Radiology;  Laterality: N/A;   • CARDIAC CATHETERIZATION     • CARDIAC SURGERY  11/22/2016    CABG   • CHEST TUBE INSERTION     • CORONARY ARTERY BYPASS GRAFT     • GALLBLADDER SURGERY     • INCISION AND DRAINAGE ABSCESS     • INGUINAL HERNIA REPAIR Bilateral    • LUMBAR LAMINECTOMY     • STERNAL REWIRING N/A 12/19/2017     "Procedure: REPAIR STERNAL DEHISCENCE WITH STERNAL DEBRIDEMENT  Need plate and screws;  Surgeon: Homero Sutton MD;  Location: [facility];  Service:    • UMBILICAL HERNIA REPAIR       History reviewed. No pertinent family history.  Social History     Tobacco Use   • Smoking status: Current Every Day Smoker     Packs/day: 0.25     Years: 40.00     Pack years: 10.00     Types: Cigarettes   • Smokeless tobacco: Never Used   Vaping Use   • Vaping Use: Never used   Substance Use Topics   • Alcohol use: No   • Drug use: No       ALLERGIES:  Allergies   Allergen Reactions   • Amiodarone Anaphylaxis   • Contrast Dye Anaphylaxis   • Iodine Anaphylaxis   • Multivitamins Anaphylaxis     Oyster calcium   • Pepcid [Famotidine] Shortness Of Breath     Pt has taken before with adverse reaction   • Proton Pump Inhibitors Shortness Of Breath   • Shellfish-Derived Products Anaphylaxis   • Dexamethasone Other (See Comments)     hiccups   • Livalo [Pitavastatin] Swelling   • Oyster Calcium Other (See Comments)     .   • Statins Swelling   • Tetracyclines & Related Other (See Comments)     \"skin falls off\"   • Penicillins Rash         MEDICATIONS:    Current Outpatient Medications:   •  albuterol sulfate  (90 Base) MCG/ACT inhaler, Inhale 2 puffs Every 4 (Four) Hours As Needed for Wheezing., Disp: , Rfl:   •  aspirin 325 MG tablet, Take 325 mg by mouth Every 12 (Twelve) Hours., Disp: , Rfl:   •  atorvastatin (LIPITOR) 20 MG tablet, TAKE 1 TABLET BY MOUTH DAILY, Disp: 90 tablet, Rfl: 2  •  Biotin 5000 MCG tablet, Take 5,000 mcg by mouth Every Night. 2 tablets, Disp: , Rfl:   •  CARTIA  MG 24 hr capsule, TAKE 1 CAPSULE BY MOUTH EVERY NIGHT, Disp: 90 capsule, Rfl: 0  •  cimetidine (Tagamet HB) 200 MG tablet, Take 2 tablets by mouth Every Night., Disp: 30 tablet, Rfl: 0  •  clopidogrel (PLAVIX) 75 MG tablet, Take 75 mg by mouth Every Night., Disp: , Rfl:   •  DULoxetine (CYMBALTA) 30 MG capsule, Take 30 mg by mouth Every " "Night., Disp: , Rfl:   •  fluticasone-salmeterol (ADVAIR) 250-50 MCG/DOSE DISKUS, Inhale 2 (Two) Times a Day., Disp: , Rfl:   •  insulin NPH (humuLIN N,novoLIN N) 100 UNIT/ML injection, Inject 42 Units under the skin 2 (Two) Times a Day., Disp: , Rfl:   •  Insulin Syringe 29G X 1/2\" 1 ML misc, See Admin Instructions., Disp: , Rfl:   •  NOVOLIN R 100 UNIT/ML injection, Inject 22 Units as directed 2 (Two) Times a Day., Disp: , Rfl:   •  ranolazine (Ranexa) 500 MG 12 hr tablet, Take 1 tablet by mouth 2 (Two) Times a Day., Disp: 180 tablet, Rfl: 3  •  sennosides-docusate (senna-docusate sodium) 8.6-50 MG per tablet, Take 2 tablets by mouth 2 (Two) Times a Day As Needed for Constipation., Disp: , Rfl:   •  tadalafil (CIALIS) 5 MG tablet, Take 5 mg by mouth Every Night., Disp: , Rfl:   •  tiotropium (SPIRIVA) 18 MCG per inhalation capsule, Place 1 capsule into inhaler and inhale As Needed., Disp: , Rfl:     Review of Systems   Review of Systems   Constitutional: Negative for malaise/fatigue and weight loss.   Cardiovascular: Positive for chest pain, claudication and dyspnea on exertion.   Respiratory: Negative for cough and shortness of breath.    Skin: Negative for color change and poor wound healing.   Musculoskeletal: Positive for back pain and muscle weakness.   Neurological: Negative for dizziness, numbness and weakness.       Physical Exam   Vitals:    04/29/22 1401   BP: 130/69   BP Location: Left arm   Patient Position: Sitting   Cuff Size: Adult   Pulse: 69   Temp: 97.7 °F (36.5 °C)   TempSrc: Temporal   SpO2: 98%   Weight: 104 kg (230 lb)   Height: 170.2 cm (67\")     Body surface area is 2.14 meters squared.  Body mass index is 36.02 kg/m².  Physical Exam  Constitutional:       General: He is not in acute distress.     Appearance: He is not ill-appearing.   HENT:      Right Ear: Hearing normal.      Left Ear: Hearing normal.      Nose: No nasal deformity.      Mouth/Throat:      Dentition: Normal dentition. Does " not have dentures.   Cardiovascular:      Rate and Rhythm: Normal rate and regular rhythm.      Pulses:           Carotid pulses are 2+ on the right side and 2+ on the left side.       Radial pulses are 2+ on the right side and 2+ on the left side.        Dorsalis pedis pulses are 1+ on the right side and 1+ on the left side.        Posterior tibial pulses are 1+ on the right side and 1+ on the left side.      Heart sounds: No murmur heard.  Pulmonary:      Effort: Pulmonary effort is normal.      Breath sounds: Normal breath sounds.   Abdominal:      General: There is no distension.      Palpations: Abdomen is soft. There is no mass.      Tenderness: There is no abdominal tenderness.   Musculoskeletal:         General: No deformity.      Comments: Gait normal.    Skin:     General: Skin is warm and dry.      Coloration: Skin is not pale.      Findings: No erythema.      Comments: No venous staining   Neurological:      Mental Status: He is alert and oriented to person, place, and time.   Psychiatric:         Speech: Speech normal.         Behavior: Behavior is cooperative.         Thought Content: Thought content normal.         Judgment: Judgment normal.         BUN   Date Value Ref Range Status   03/22/2022 19 8 - 23 mg/dL Final     Creatinine   Date Value Ref Range Status   03/22/2022 1.37 (H) 0.76 - 1.27 mg/dL Final     eGFR Non  Amer   Date Value Ref Range Status   02/03/2021 59 (L) >60 mL/min/1.73 Final       ASSESSMENT/PLAN    1. Bilateral carotid artery stenosis  Status post left trans carotid stenting  Interview of patient is negative for reperfusion symptoms    Long term ASA, PLavix, Statin recommended.  Contact office prior to holding plavix before any elective procedure    Patient has not tested positive for COVID-19 in the calendar year post TCAR    Plan to repeat duplex in 4 weeks    - Duplex Carotid - Left Ultrasound CAR; Future    2. Mixed hyperlipidemia  Lipid-lowering therapy has been  proven beneficial in patients with cardio-vascular disease. Current guidelines recommend statin treatment for all patients with PAD,CAD and carotid stenosis. Statins are beneficial in preventing cardiovascular events, increasing functional capacity and lower the risk of adverse limb loss in PAD. Statins decrease the progression of plaque formation and may improve peripheral vessel lining, and aid in reversing atherosclerosis.    3. Surgical aftercare, circulatory system  Clean operative site with antibacterial soap/water, pat dry. Keep open to air unless draining, then may apply dry dressing.  No ointments or creams unless prescribed by provider.    If you should experience any neurological symptoms including but not limited to visual or speech disturbances confusion, seizures, or weakness of limbs of one side of your body notify [facility] and Vascular center immediately for evaluation or if after hours present to the nearest Emergency Department.           This document has been electronically signed by ANALY Muñoz-BC @  On May 3, 2022 15:34 CDT

## 2022-05-04 RX ORDER — CLOPIDOGREL BISULFATE 75 MG/1
TABLET ORAL
Qty: 30 TABLET | Refills: 5 | Status: SHIPPED | OUTPATIENT
Start: 2022-05-04 | End: 2022-08-31 | Stop reason: SDUPTHER

## 2022-05-27 ENCOUNTER — OFFICE VISIT (OUTPATIENT)
Dept: CARDIAC SURGERY | Facility: CLINIC | Age: 62
End: 2022-05-27

## 2022-05-27 VITALS
SYSTOLIC BLOOD PRESSURE: 126 MMHG | HEART RATE: 82 BPM | DIASTOLIC BLOOD PRESSURE: 70 MMHG | OXYGEN SATURATION: 99 % | TEMPERATURE: 98 F | WEIGHT: 228 LBS | HEIGHT: 67 IN | BODY MASS INDEX: 35.79 KG/M2

## 2022-05-27 DIAGNOSIS — F17.218 NICOTINE DEPENDENCE, CIGARETTES, WITH OTHER NICOTINE-INDUCED DISORDERS: ICD-10-CM

## 2022-05-27 DIAGNOSIS — Z48.812 SURGICAL AFTERCARE, CIRCULATORY SYSTEM: ICD-10-CM

## 2022-05-27 DIAGNOSIS — I65.23 BILATERAL CAROTID ARTERY STENOSIS: Primary | ICD-10-CM

## 2022-05-27 DIAGNOSIS — I73.9 PVD (PERIPHERAL VASCULAR DISEASE): ICD-10-CM

## 2022-05-27 DIAGNOSIS — E78.2 MIXED HYPERLIPIDEMIA: ICD-10-CM

## 2022-05-27 PROCEDURE — 99024 POSTOP FOLLOW-UP VISIT: CPT | Performed by: NURSE PRACTITIONER

## 2022-05-27 RX ORDER — ASPIRIN 81 MG/1
81 TABLET, CHEWABLE ORAL DAILY
COMMUNITY
End: 2022-08-31 | Stop reason: SDUPTHER

## 2022-05-27 NOTE — PROGRESS NOTES
5/27/2022    Paddy Brantley  1960    Chief Complaint:    Chief Complaint   Patient presents with   • Carotid Artery Disease       HPI:      PCP:  Kwaku Iniguez,   Cardiology: Dr. Resendez     61y.o. male with HTN(uncontrolled, increased risk stroke, rupture), Diabetes Mellitus(stable, increased risk cardiovascular events), Obesity(uncontrolled, increased risk cardiovascular events), Smoker(uncontrolled, increased risk cardiovascular events), COPD(stable, increased risk pulmonary complications) and Chronic Kidney Disease(stable, increased risk renal failure) CAD(stable, increase risk cardiovascular events).  smokes 1 PPD. moderate leg pain walking 50ft x4yrs, worse past 6 months. Worse walking incline. 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 fall in which his sternum made first contact with the ground followed by his head, chest x-ray revealed fracture of sternal plating at the base of the sternum, he has significant pain, reports inability to perform job duties effectively due to his pain.  Off 6 weeks from work, feels unable to return due to pain intolerance. Pain has since improved, feels lower sternal area pop/click.  Underwent recent TCAR no issues, returns today in post operative follow up.  No other associated signs, symptoms or modifying factors.     11/2016: Carotid duplex: BICA <50%   3/2022 Carotid duplex:  RENU 50-69% (158/46cm/s, ratio 1.6), LICA >70% (345cm/s, ratio 2.1).  Antegrade verts.  3/2022 CTA Carotids:  RENU 60%, LICA 80%  4/2022 L TCAR  5/2022 Carotid Duplex:  LICA 0-49% mPSV 111c/s Ratio 1.0, Antegrade vertebrals     12/2020 DAISHA:  RIGHT .71 biphasic. , LEFT .59 biphasic.  2/2021 CTA Aorta runoff:  RIGHT SFA 80%, 3v runoff.  LEFT common iliac 90/70%, 3v runoff.  3/2021 Aortagram: Bilateral PEGGY stent (kissing), R SFA PTA  4/2021 DAISHA: Right 0.87 triphasic.  Left 0.94  triphasic.  6/2/21: DAISHA: RIGHT 0.8 Triphasic LEFT 0.93 Triphasic   9/7/21: DAISHA: RIGHT 0.84 Triphasic LEFT 0.93 Triphasic    3/2022: DAISHA: RIGHT 0.92 Triphasic LEFT 1.0 Triphasic     11/2016 echocardiogram: EF 50-55%, mild MAC, trace MR, diastolic dysfunction  11/2016 Cardiac Catheterization: 70% LAD, 70% circumflex, 80% RCA, 70% diagonal  11/2016 CABG x4 LIMA-LAD, SVG-OM, SVG-DX, SVG-PDA, EVH  12/2017 Sternal revision and Repair with Sternal Plates.   3/2019 CXR:  Broken 4th place distal sternum  7/2019 CT Chest:  First 3 sternal plates intact.  Lower sternal separation with broken plate.  No change or migration of hardware.  10/2020 Stress Test:  EF 65%, no ischemia.  Low risk study.      The following portions of the patient's history were reviewed and updated as appropriate: allergies, current medications, past family history, past medical history, past social history, past surgical history and problem list.  Recent images independently reviewed.  Available laboratory values reviewed.    PMH:  Past Medical History:   Diagnosis Date   • Accident caused by hypodermic needle    • Guerra esophagus    • Contact with and (suspected) exposure to potentially hazardous body fluids    • Diabetes mellitus (HCC)    • GERD (gastroesophageal reflux disease)    • Kidney stones    • Meniere's disease    • MRSA infection     right ing hernia   • PAD (peripheral artery disease) ([facility])      Past Surgical History:   Procedure Laterality Date   • APPENDECTOMY     • ARTERIOGRAM N/A 4/1/2021    Procedure: lower extremity Arteriogram possible angioplasty stent atherectomy thrombolysis;  Surgeon: Homero Sutton MD;  Location: [facility] ANGIO INVASIVE LOCATION;  Service: Interventional Radiology;  Laterality: N/A;   • CARDIAC CATHETERIZATION     • CARDIAC SURGERY  11/22/2016    CABG   • CHEST TUBE INSERTION     • CORONARY ARTERY BYPASS GRAFT     • GALLBLADDER SURGERY     • INCISION AND DRAINAGE ABSCESS     • INGUINAL HERNIA REPAIR  "Bilateral    • LUMBAR LAMINECTOMY     • STERNAL REWIRING N/A 12/19/2017    Procedure: REPAIR STERNAL DEHISCENCE WITH STERNAL DEBRIDEMENT  Need plate and screws;  Surgeon: Homero Sutton MD;  Location: [facility];  Service:    • UMBILICAL HERNIA REPAIR       History reviewed. No pertinent family history.  Social History     Tobacco Use   • Smoking status: Current Every Day Smoker     Packs/day: 0.25     Years: 40.00     Pack years: 10.00     Types: Cigarettes   • Smokeless tobacco: Never Used   Vaping Use   • Vaping Use: Never used   Substance Use Topics   • Alcohol use: No   • Drug use: No       ALLERGIES:  Allergies   Allergen Reactions   • Amiodarone Anaphylaxis   • Contrast Dye Anaphylaxis   • Iodine Anaphylaxis   • Multivitamins Anaphylaxis     Oyster calcium   • Pepcid [Famotidine] Shortness Of Breath     Pt has taken before with adverse reaction   • Proton Pump Inhibitors Shortness Of Breath   • Shellfish-Derived Products Anaphylaxis   • Dexamethasone Other (See Comments)     hiccups   • Livalo [Pitavastatin] Swelling   • Oyster Calcium Other (See Comments)     .   • Statins Swelling   • Tetracyclines & Related Other (See Comments)     \"skin falls off\"   • Penicillins Rash         MEDICATIONS:    Current Outpatient Medications:   •  albuterol sulfate  (90 Base) MCG/ACT inhaler, Inhale 2 puffs Every 4 (Four) Hours As Needed for Wheezing., Disp: , Rfl:   •  aspirin 325 MG tablet, Take 325 mg by mouth Every 12 (Twelve) Hours., Disp: , Rfl:   •  Biotin 5000 MCG tablet, Take 5,000 mcg by mouth Every Night. 2 tablets, Disp: , Rfl:   •  CARTIA  MG 24 hr capsule, TAKE 1 CAPSULE BY MOUTH EVERY NIGHT, Disp: 90 capsule, Rfl: 0  •  cimetidine (Tagamet HB) 200 MG tablet, Take 2 tablets by mouth Every Night., Disp: 30 tablet, Rfl: 0  •  clopidogrel (PLAVIX) 75 MG tablet, TAKE 1 TABLET BY MOUTH DAILY, Disp: 30 tablet, Rfl: 5  •  DULoxetine (CYMBALTA) 30 MG capsule, Take 30 mg by mouth Every Night., Disp: " ", Rfl:   •  fluticasone-salmeterol (ADVAIR) 250-50 MCG/DOSE DISKUS, Inhale 2 (Two) Times a Day., Disp: , Rfl:   •  insulin NPH (humuLIN N,novoLIN N) 100 UNIT/ML injection, Inject 42 Units under the skin 2 (Two) Times a Day., Disp: , Rfl:   •  Insulin Syringe 29G X 1/2\" 1 ML misc, See Admin Instructions., Disp: , Rfl:   •  NOVOLIN R 100 UNIT/ML injection, Inject 22 Units as directed 2 (Two) Times a Day., Disp: , Rfl:   •  ranolazine (Ranexa) 500 MG 12 hr tablet, Take 1 tablet by mouth 2 (Two) Times a Day., Disp: 180 tablet, Rfl: 3  •  sennosides-docusate (senna-docusate sodium) 8.6-50 MG per tablet, Take 2 tablets by mouth 2 (Two) Times a Day As Needed for Constipation., Disp: , Rfl:   •  tadalafil (CIALIS) 5 MG tablet, Take 5 mg by mouth Every Night., Disp: , Rfl:   •  tiotropium (SPIRIVA) 18 MCG per inhalation capsule, Place 1 capsule into inhaler and inhale As Needed., Disp: , Rfl:     Review of Systems   Review of Systems   Constitutional: Negative for malaise/fatigue and weight loss.   Cardiovascular: Positive for chest pain, claudication and dyspnea on exertion.   Respiratory: Negative for cough and shortness of breath.    Skin: Negative for color change and poor wound healing.   Musculoskeletal: Positive for back pain and muscle weakness.   Neurological: Negative for dizziness, numbness and weakness.       Physical Exam   Vitals:    05/27/22 1317   BP: 126/70   BP Location: Left arm   Patient Position: Sitting   Cuff Size: Adult   Pulse: 82   Temp: 98 °F (36.7 °C)   TempSrc: Temporal   SpO2: 99%   Weight: 103 kg (228 lb)   Height: 170.2 cm (67\")     Body surface area is 2.13 meters squared.  Body mass index is 35.71 kg/m².  Physical Exam  Constitutional:       General: He is not in acute distress.     Appearance: He is not ill-appearing.   HENT:      Right Ear: Hearing normal.      Left Ear: Hearing normal.      Nose: No nasal deformity.      Mouth/Throat:      Dentition: Normal dentition. Does not have " dentures.   Cardiovascular:      Rate and Rhythm: Normal rate and regular rhythm.      Pulses:           Carotid pulses are 2+ on the right side and 2+ on the left side.       Radial pulses are 2+ on the right side and 2+ on the left side.        Dorsalis pedis pulses are 1+ on the right side and 1+ on the left side.        Posterior tibial pulses are 1+ on the right side and 1+ on the left side.      Heart sounds: No murmur heard.  Pulmonary:      Effort: Pulmonary effort is normal.      Breath sounds: Normal breath sounds.   Abdominal:      General: There is no distension.      Palpations: Abdomen is soft. There is no mass.      Tenderness: There is no abdominal tenderness.   Musculoskeletal:         General: No deformity.      Comments: Gait normal.    Skin:     General: Skin is warm and dry.      Coloration: Skin is not pale.      Findings: No erythema.      Comments: No venous staining   Neurological:      Mental Status: He is alert and oriented to person, place, and time.   Psychiatric:         Speech: Speech normal.         Behavior: Behavior is cooperative.         Thought Content: Thought content normal.         Judgment: Judgment normal.         BUN   Date Value Ref Range Status   03/22/2022 19 8 - 23 mg/dL Final     Creatinine   Date Value Ref Range Status   03/22/2022 1.37 (H) 0.76 - 1.27 mg/dL Final     eGFR Non  Amer   Date Value Ref Range Status   02/03/2021 59 (L) >60 mL/min/1.73 Final       ASSESSMENT/PLAN  5/2022 Carotid Duplex:  LICA 0-49% mPSV 111c/s Ratio 1.0, Antegrade vertebrals      1. Bilateral carotid artery stenosis  Status post left trans carotid stenting  Interview of patient is negative for reperfusion symptoms    Long term ASA, PLavix, Statin recommended.  No longer taking statin s/t side effects, nor can he take repatha due to similar side effects.  Contact office prior to holding plavix before any elective procedure    Patient has not tested positive for COVID-19 in the  calendar year post TCAR    Plan to repeat duplex in 12 weeks    Ultrasound patent with stable velocities    2. Mixed hyperlipidemia  Lipid-lowering therapy has been proven beneficial in patients with cardio-vascular disease. Current guidelines recommend statin treatment for all patients with PAD,CAD and carotid stenosis. Statins are beneficial in preventing cardiovascular events, increasing functional capacity and lower the risk of adverse limb loss in PAD. Statins decrease the progression of plaque formation and may improve peripheral vessel lining, and aid in reversing atherosclerosis.    3. Surgical aftercare, circulatory system  Clean operative site with antibacterial soap/water, pat dry. Keep open to air unless draining, then may apply dry dressing.  No ointments or creams unless prescribed by provider.    If you should experience any neurological symptoms including but not limited to visual or speech disturbances confusion, seizures, or weakness of limbs of one side of your body notify Heart and Vascular center immediately for evaluation or if after hours present to the nearest Emergency Department.     4. PVD  Hx of bilateral iliac stenting  Repeat DAISHA next visit    Remains on DAPT    5. Smoker  Smoking cessation assistance options offered including behavioral counseling (Smoking Cessation Classes), Nicotine replacement therapy (patches or gum), pharmacologic therapy (Chantix, Wellbutrin). Understands tobacco increases risk of expanding AAA, MI, CVA, PAD, carcinoma. Discussion and question answer period 5-7 minutes.        This document has been electronically signed by ANALY Muñoz-BC Faye  On May 27, 2022 13:30 CDT

## 2022-05-27 NOTE — PATIENT INSTRUCTIONS
Clean operative site with antibacterial soap/water, pat dry. Keep open to air unless draining, then may apply dry dressing.  No ointments or creams unless prescribed by provider.    Left carotid ultrasound with patent stent    Repeat bilateral carotid ultrasound in 3 months

## 2022-07-12 NOTE — PATIENT INSTRUCTIONS
If symptoms not better in 3 days, notify Provider for addition of steroids.     Keep f/u with Stefanie next week with CXR.     CXR today improved aeration of lung.     WBC elevated 13   Suturegard Retention Suture: 2-0 Nylon

## 2022-08-31 ENCOUNTER — OFFICE VISIT (OUTPATIENT)
Dept: CARDIAC SURGERY | Facility: CLINIC | Age: 62
End: 2022-08-31

## 2022-08-31 VITALS
WEIGHT: 228 LBS | HEART RATE: 72 BPM | SYSTOLIC BLOOD PRESSURE: 132 MMHG | OXYGEN SATURATION: 97 % | DIASTOLIC BLOOD PRESSURE: 78 MMHG | BODY MASS INDEX: 35.79 KG/M2 | HEIGHT: 67 IN

## 2022-08-31 DIAGNOSIS — F17.200 SMOKER: ICD-10-CM

## 2022-08-31 DIAGNOSIS — I65.23 BILATERAL CAROTID ARTERY STENOSIS: Primary | ICD-10-CM

## 2022-08-31 DIAGNOSIS — E78.2 MIXED HYPERLIPIDEMIA: ICD-10-CM

## 2022-08-31 DIAGNOSIS — I73.9 PVD (PERIPHERAL VASCULAR DISEASE): ICD-10-CM

## 2022-08-31 PROCEDURE — 99214 OFFICE O/P EST MOD 30 MIN: CPT | Performed by: NURSE PRACTITIONER

## 2022-08-31 RX ORDER — CLOPIDOGREL BISULFATE 75 MG/1
75 TABLET ORAL DAILY
Qty: 90 TABLET | Refills: 3 | Status: SHIPPED | OUTPATIENT
Start: 2022-08-31

## 2022-08-31 RX ORDER — ASPIRIN 81 MG/1
81 TABLET, CHEWABLE ORAL DAILY
Qty: 90 TABLET | Refills: 3 | Status: SHIPPED | OUTPATIENT
Start: 2022-08-31

## 2022-09-01 NOTE — PROGRESS NOTES
9/1/2022    Paddy Brantley  1960    Chief Complaint:    Chief Complaint   Patient presents with   • Carotid Artery Disease       HPI:      PCP:  Kwaku Iniguez,   Cardiology: Dr. Resendez     61y.o. male with HTN(uncontrolled, increased risk stroke, rupture), Diabetes Mellitus(stable, increased risk cardiovascular events), Obesity(uncontrolled, increased risk cardiovascular events), Smoker(uncontrolled, increased risk cardiovascular events), COPD(stable, increased risk pulmonary complications) and Chronic Kidney Disease(stable, increased risk renal failure) CAD(stable, increase risk cardiovascular events).  smokes 1 PPD. moderate leg pain walking 50ft x4yrs, worse past 6 months. Worse walking incline. 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 fall in which his sternum made first contact with the ground followed by his head, chest x-ray revealed fracture of sternal plating at the base of the sternum, he has significant pain, reports inability to perform job duties effectively due to his pain.  Off 6 weeks from work, feels unable to return due to pain intolerance. Pain has since improved, feels lower sternal area pop/click.  Underwent recent TCAR no issues, returns today in post operative follow up.  DAISHA and carotid today, no recent hospitalization since procedure, reports improved mental acuity since TCAR.  No other associated signs, symptoms or modifying factors.     11/2016: Carotid duplex: BICA <50%   3/2022 Carotid duplex:  RENU 50-69% (158/46cm/s, ratio 1.6), LICA >70% (345cm/s, ratio 2.1).  Antegrade verts.  3/2022 CTA Carotids:  RENU 60%, LICA 80%  4/2022 L TCAR  5/2022 Carotid Duplex:  LICA 0-49% mPSV 111c/s Ratio 1.0, Antegrade vertebrals  8/2022 Carotid Duplex: RENU 50-69% mPSV 166c/s Ratio 2.6, LICA 0-49% mPSV 115c/s Ratio 1.5 , Antegrade vertebrals     12/2020 DAISHA:  RIGHT .71  biphasic. , LEFT .59 biphasic.  2/2021 CTA Aorta runoff:  RIGHT SFA 80%, 3v runoff.  LEFT common iliac 90/70%, 3v runoff.  3/2021 Aortagram: Bilateral PEGGY stent (kissing), R SFA PTA  4/2021 DAISHA: Right 0.87 triphasic.  Left 0.94 triphasic.  6/2/21: DAISHA: RIGHT 0.8 Triphasic LEFT 0.93 Triphasic   9/7/21: DAISHA: RIGHT 0.84 Triphasic LEFT 0.93 Triphasic    3/2022: DAISHA: RIGHT 0.92 Triphasic LEFT 1.0 Triphasic   8/2022 DAISHA: Right 0.82 triphasic.  Left 0.87 triphasic.       11/2016 echocardiogram: EF 50-55%, mild MAC, trace MR, diastolic dysfunction  11/2016 Cardiac Catheterization: 70% LAD, 70% circumflex, 80% RCA, 70% diagonal  11/2016 CABG x4 LIMA-LAD, SVG-OM, SVG-DX, SVG-PDA, EVH  12/2017 Sternal revision and Repair with Sternal Plates.   3/2019 CXR:  Broken 4th place distal sternum  7/2019 CT Chest:  First 3 sternal plates intact.  Lower sternal separation with broken plate.  No change or migration of hardware.  10/2020 Stress Test:  EF 65%, no ischemia.  Low risk study.      The following portions of the patient's history were reviewed and updated as appropriate: allergies, current medications, past family history, past medical history, past social history, past surgical history and problem list.  Recent images independently reviewed.  Available laboratory values reviewed.    PMH:  Past Medical History:   Diagnosis Date   • Accident caused by hypodermic needle    • Guerra esophagus    • Contact with and (suspected) exposure to potentially hazardous body fluids    • Diabetes mellitus (HCC)    • GERD (gastroesophageal reflux disease)    • Kidney stones    • Meniere's disease    • MRSA infection     right ing hernia   • PAD (peripheral artery disease) (HCC)      Past Surgical History:   Procedure Laterality Date   • APPENDECTOMY     • ARTERIOGRAM N/A 4/1/2021    Procedure: lower extremity Arteriogram possible angioplasty stent atherectomy thrombolysis;  Surgeon: Homero Sutton MD;  Location: [facility] ANGIO INVASIVE  "LOCATION;  Service: Interventional Radiology;  Laterality: N/A;   • CARDIAC CATHETERIZATION     • CARDIAC SURGERY  11/22/2016    CABG   • CHEST TUBE INSERTION     • CORONARY ARTERY BYPASS GRAFT     • GALLBLADDER SURGERY     • INCISION AND DRAINAGE ABSCESS     • INGUINAL HERNIA REPAIR Bilateral    • LUMBAR LAMINECTOMY     • STERNAL REWIRING N/A 12/19/2017    Procedure: REPAIR STERNAL DEHISCENCE WITH STERNAL DEBRIDEMENT  Need plate and screws;  Surgeon: Homero Sutton MD;  Location: [facility] OR;  Service:    • UMBILICAL HERNIA REPAIR       History reviewed. No pertinent family history.  Social History     Tobacco Use   • Smoking status: Current Every Day Smoker     Packs/day: 0.25     Years: 40.00     Pack years: 10.00     Types: Cigarettes   • Smokeless tobacco: Never Used   Vaping Use   • Vaping Use: Never used   Substance Use Topics   • Alcohol use: No   • Drug use: No       ALLERGIES:  Allergies   Allergen Reactions   • Amiodarone Anaphylaxis   • Contrast Dye Anaphylaxis   • Iodine Anaphylaxis   • Multivitamins Anaphylaxis     Oyster calcium   • Pepcid [Famotidine] Shortness Of Breath     Pt has taken before with adverse reaction   • Proton Pump Inhibitors Shortness Of Breath   • Shellfish-Derived Products Anaphylaxis   • Dexamethasone Other (See Comments)     hiccups   • Livalo [Pitavastatin] Swelling   • Oyster Calcium Other (See Comments)     .   • Statins Swelling   • Tetracyclines & Related Other (See Comments)     \"skin falls off\"   • Penicillins Rash         MEDICATIONS:    Current Outpatient Medications:   •  albuterol sulfate  (90 Base) MCG/ACT inhaler, Inhale 2 puffs Every 4 (Four) Hours As Needed for Wheezing., Disp: , Rfl:   •  aspirin 81 MG chewable tablet, Chew 1 tablet Daily., Disp: 90 tablet, Rfl: 3  •  Biotin 5000 MCG tablet, Take 5,000 mcg by mouth Every Night. 2 tablets, Disp: , Rfl:   •  CARTIA  MG 24 hr capsule, TAKE 1 CAPSULE BY MOUTH EVERY NIGHT, Disp: 90 capsule, Rfl: " "0  •  cimetidine (Tagamet HB) 200 MG tablet, Take 2 tablets by mouth Every Night., Disp: 30 tablet, Rfl: 0  •  clopidogrel (PLAVIX) 75 MG tablet, Take 1 tablet by mouth Daily., Disp: 90 tablet, Rfl: 3  •  DULoxetine (CYMBALTA) 30 MG capsule, Take 30 mg by mouth Every Night., Disp: , Rfl:   •  fluticasone-salmeterol (ADVAIR) 250-50 MCG/DOSE DISKUS, Inhale 2 (Two) Times a Day., Disp: , Rfl:   •  insulin NPH (humuLIN N,novoLIN N) 100 UNIT/ML injection, Inject 42 Units under the skin 2 (Two) Times a Day., Disp: , Rfl:   •  Insulin Syringe 29G X 1/2\" 1 ML misc, See Admin Instructions., Disp: , Rfl:   •  NOVOLIN R 100 UNIT/ML injection, Inject 22 Units as directed 2 (Two) Times a Day., Disp: , Rfl:   •  ranolazine (Ranexa) 500 MG 12 hr tablet, Take 1 tablet by mouth 2 (Two) Times a Day., Disp: 180 tablet, Rfl: 3  •  sennosides-docusate (senna-docusate sodium) 8.6-50 MG per tablet, Take 2 tablets by mouth 2 (Two) Times a Day As Needed for Constipation., Disp: , Rfl:   •  tadalafil (CIALIS) 5 MG tablet, Take 5 mg by mouth Every Night., Disp: , Rfl:   •  tiotropium (SPIRIVA) 18 MCG per inhalation capsule, Place 1 capsule into inhaler and inhale As Needed., Disp: , Rfl:     Review of Systems   Review of Systems   Constitutional: Negative for malaise/fatigue and weight loss.   Cardiovascular: Positive for chest pain. Negative for claudication and dyspnea on exertion.   Respiratory: Negative for cough and shortness of breath.    Skin: Negative for color change and poor wound healing.   Musculoskeletal: Positive for back pain and muscle weakness.   Neurological: Negative for dizziness, numbness and weakness.       Physical Exam   Vitals:    08/31/22 1511   BP: 132/78   BP Location: Left arm   Patient Position: Sitting   Cuff Size: Adult   Pulse: 72   SpO2: 97%   Weight: 103 kg (228 lb)   Height: 170.2 cm (67\")     Body surface area is 2.13 meters squared.  Body mass index is 35.71 kg/m².  Physical Exam  Constitutional:       " General: He is not in acute distress.     Appearance: He is not ill-appearing.   HENT:      Right Ear: Hearing normal.      Left Ear: Hearing normal.      Nose: No nasal deformity.      Mouth/Throat:      Dentition: Normal dentition. Does not have dentures.   Cardiovascular:      Rate and Rhythm: Normal rate and regular rhythm.      Pulses:           Radial pulses are 2+ on the right side and 2+ on the left side.        Dorsalis pedis pulses are 2+ on the right side and 2+ on the left side.        Posterior tibial pulses are 2+ on the right side and 1+ on the left side.      Heart sounds: No murmur heard.  Pulmonary:      Effort: Pulmonary effort is normal.      Breath sounds: Normal breath sounds.   Abdominal:      General: There is no distension.      Palpations: Abdomen is soft. There is no mass.      Tenderness: There is no abdominal tenderness.   Musculoskeletal:         General: No deformity.      Comments: Gait normal.    Skin:     General: Skin is warm and dry.      Coloration: Skin is not pale.      Findings: No erythema.      Comments: No venous staining   Neurological:      Mental Status: He is alert and oriented to person, place, and time.   Psychiatric:         Speech: Speech normal.         Behavior: Behavior is cooperative.         Thought Content: Thought content normal.         Judgment: Judgment normal.         BUN   Date Value Ref Range Status   03/22/2022 19 8 - 23 mg/dL Final     Creatinine   Date Value Ref Range Status   03/22/2022 1.37 (H) 0.76 - 1.27 mg/dL Final     eGFR Non  Amer   Date Value Ref Range Status   02/03/2021 59 (L) >60 mL/min/1.73 Final       ASSESSMENT/PLAN  8/2022 DAISHA: Right 0.82 triphasic.  Left 0.87 triphasic.   8/2022 Carotid Duplex: RENU 50-69% mPSV 166c/s Ratio 2.6, LICA 0-49% mPSV 115c/s Ratio 1.5 , Antegrade vertebrals      1. Bilateral carotid artery stenosis  Moderate R ICA disease, ultrasound consistent with CT imaging, no surgical intervention indicated at  this time.     Repeat duplex in 6 months    Sustained patency of L ICA stent.     DAPT, intolerant of statin and repatha.     - Duplex Carotid Ultrasound CAR; Future    2. PVD (peripheral vascular disease) (HCC)  Mild reduction bilateral lower extremity perfusion  No claudication    Triphasic distal signals    DAPT    Repeat DAISHA In 6 months, iliac ultrasounds in one year    - Doppler Ankle Brachial Index Single Level CAR; Future    3. Mixed hyperlipidemia  Lipid-lowering therapy has been proven beneficial in patients with cardio-vascular disease. Current guidelines recommend statin treatment for all patients with PAD,CAD and carotid stenosis. Statins are beneficial in preventing cardiovascular events, increasing functional capacity and lower the risk of adverse limb loss in PAD. Statins decrease the progression of plaque formation and may improve peripheral vessel lining, and aid in reversing atherosclerosis.    4. Smoker  Smoking cessation assistance options offered including behavioral counseling (Smoking Cessation Classes), Nicotine replacement therapy (patches or gum), pharmacologic therapy (Chantix, Wellbutrin). Understands tobacco increases risk of expanding AAA, MI, CVA, PAD, carcinoma. Discussion and question answer period 5-7 minutes.              This document has been electronically signed by ANALY Muñoz-BC @  On September 1, 2022 14:15 CDT

## 2022-10-17 RX ORDER — ATORVASTATIN CALCIUM 20 MG/1
20 TABLET, FILM COATED ORAL DAILY
Qty: 90 TABLET | Refills: 1 | Status: SHIPPED | OUTPATIENT
Start: 2022-10-17 | End: 2023-03-01

## 2023-03-01 ENCOUNTER — OFFICE VISIT (OUTPATIENT)
Dept: CARDIAC SURGERY | Facility: CLINIC | Age: 63
End: 2023-03-01
Payer: COMMERCIAL

## 2023-03-01 VITALS
DIASTOLIC BLOOD PRESSURE: 80 MMHG | HEIGHT: 67 IN | HEART RATE: 84 BPM | OXYGEN SATURATION: 98 % | WEIGHT: 226 LBS | BODY MASS INDEX: 35.47 KG/M2 | SYSTOLIC BLOOD PRESSURE: 149 MMHG

## 2023-03-01 DIAGNOSIS — I65.23 BILATERAL CAROTID ARTERY STENOSIS: ICD-10-CM

## 2023-03-01 DIAGNOSIS — F17.200 SMOKER: ICD-10-CM

## 2023-03-01 DIAGNOSIS — E78.2 MIXED HYPERLIPIDEMIA: ICD-10-CM

## 2023-03-01 DIAGNOSIS — I73.9 PVD (PERIPHERAL VASCULAR DISEASE): Primary | ICD-10-CM

## 2023-03-01 DIAGNOSIS — E66.09 CLASS 1 OBESITY DUE TO EXCESS CALORIES WITH SERIOUS COMORBIDITY AND BODY MASS INDEX (BMI) OF 34.0 TO 34.9 IN ADULT: ICD-10-CM

## 2023-03-01 PROCEDURE — 99214 OFFICE O/P EST MOD 30 MIN: CPT | Performed by: NURSE PRACTITIONER

## 2023-03-01 RX ORDER — BACLOFEN 10 MG/1
10 TABLET ORAL 3 TIMES DAILY
COMMUNITY

## 2023-03-01 NOTE — PATIENT INSTRUCTIONS
The results of your vascular studies of your right leg shows moderate disease with fair  circulation, in the left leg shows milddisease with good  circulation.      If signs and symptoms of ischemia should occur including but not limited to pale/blue discoloration of limb, increasing pain with ambulation or at rest, or a non-healing wound. Patient is to notify [facility] center for immediate evaluation.    The results of your carotid ultrasound shows severe disease of the right carotid and is worsening from previous exam, ultrasound of the left carotid shows moderate disease and is stable from previous exam.    Recommend CTA Carotid     Follow up in 6 months    If you should experience any neurological symptoms including but not limited to visual or speech disturbances confusion, seizures, or weakness of limbs of one side of your body notify [facility] V[facility] center immediately for evaluation or if after hours present to the nearest Emergency Department.

## 2023-03-02 NOTE — PROGRESS NOTES
3/2/2023    Paddy Brantley  1960    Chief Complaint:    Chief Complaint   Patient presents with   • Carotid Artery Disease   • Peripheral Vascular Disease       HPI:      PCP:  Kwaku Iniguez,   Cardiology: Dr. Resendez     61y.o. male with HTN(uncontrolled, increased risk stroke, rupture), Diabetes Mellitus(stable, increased risk cardiovascular events), Obesity(uncontrolled, increased risk cardiovascular events), Smoker(uncontrolled, increased risk cardiovascular events), COPD(stable, increased risk pulmonary complications) and Chronic Kidney Disease(stable, increased risk renal failure) CAD(stable, increase risk cardiovascular events).  smokes 1 PPD. moderate leg pain walking 50ft x4yrs, worse past 6 months. Worse walking incline. 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 fall in which his sternum made first contact with the ground followed by his head, chest x-ray revealed fracture of sternal plating at the base of the sternum.  Underwent recent TCAR no issues, returns today in follow-up.  He is intolerant of statins, PCSK9 inhibitors, he has declined trying Leqvio.  Has developed new onset right leg claudication, complains of neck pain, has quit taking his Plavix secondary to gastritis undergoing GI work-up.     11/2016: Carotid duplex: BICA <50%   3/2022 Carotid duplex:  RENU 50-69% (158/46cm/s, ratio 1.6), LICA >70% (345cm/s, ratio 2.1).  Antegrade verts.  3/2022 CTA Carotids:  RENU 60%, LICA 80%  4/2022 L TCAR  5/2022 Carotid Duplex:  LICA 0-49% mPSV 111c/s Ratio 1.0, Antegrade vertebrals  8/2022 Carotid Duplex: RENU 50-69% mPSV 166c/s Ratio 2.6, LICA 0-49% mPSV 115c/s Ratio 1.5 , Antegrade vertebrals  3/2023 Carotid Duplex: RENU >70% mPSV 267c/s mEDV 40c/s Ratio 1.8, LICA 50-69% mPSV 156c/s mEDV 31c/s Ratio 0.8, Antegrade vertebrals       12/2020 DAISHA:  RIGHT .71 biphasic. , LEFT .59  biphasic.  2/2021 CTA Aorta runoff:  RIGHT SFA 80%, 3v runoff.  LEFT common iliac 90/70%, 3v runoff.  3/2021 Aortagram: Bilateral PEGGY stent (kissing), R SFA PTA  4/2021 DAISHA: Right 0.87 triphasic.  Left 0.94 triphasic.  6/2/21: DAISHA: RIGHT 0.8 Triphasic LEFT 0.93 Triphasic   9/7/21: DAISHA: RIGHT 0.84 Triphasic LEFT 0.93 Triphasic    3/2022: DAISHA: RIGHT 0.92 Triphasic LEFT 1.0 Triphasic   8/2022 DAISHA: Right 0.82 triphasic.  Left 0.87 triphasic.   3/2023 DAISHA: Right 0.57 triphasic popliteal biphasic distally.  Left 0.83 triphasic biphasic    11/2016 echocardiogram: EF 50-55%, mild MAC, trace MR, diastolic dysfunction  11/2016 Cardiac Catheterization: 70% LAD, 70% circumflex, 80% RCA, 70% diagonal  11/2016 CABG x4 LIMA-LAD, SVG-OM, SVG-DX, SVG-PDA, EVH  12/2017 Sternal revision and Repair with Sternal Plates.   3/2019 CXR:  Broken 4th place distal sternum  7/2019 CT Chest:  First 3 sternal plates intact.  Lower sternal separation with broken plate.  No change or migration of hardware.  10/2020 Stress Test:  EF 65%, no ischemia.  Low risk study.      The following portions of the patient's history were reviewed and updated as appropriate: allergies, current medications, past family history, past medical history, past social history, past surgical history and problem list.  Recent images independently reviewed.  Available laboratory values reviewed.    PMH:  Past Medical History:   Diagnosis Date   • Accident caused by hypodermic needle    • Guerra esophagus    • Contact with and (suspected) exposure to potentially hazardous body fluids    • Diabetes mellitus (HCC)    • GERD (gastroesophageal reflux disease)    • Kidney stones    • Meniere's disease    • MRSA infection     right ing hernia   • PAD (peripheral artery disease) (HCC)      Past Surgical History:   Procedure Laterality Date   • APPENDECTOMY     • ARTERIOGRAM N/A 4/1/2021    Procedure: lower extremity Arteriogram possible angioplasty stent atherectomy thrombolysis;   "Surgeon: Homero Sutton MD;  Location: [facility] LOCATION;  Service: Interventional Radiology;  Laterality: N/A;   • CARDIAC CATHETERIZATION     • CARDIAC SURGERY  11/22/2016    CABG   • CHEST TUBE INSERTION     • CORONARY ARTERY BYPASS GRAFT     • GALLBLADDER SURGERY     • INCISION AND DRAINAGE ABSCESS     • INGUINAL HERNIA REPAIR Bilateral    • LUMBAR LAMINECTOMY     • STERNAL REWIRING N/A 12/19/2017    Procedure: REPAIR STERNAL DEHISCENCE WITH STERNAL DEBRIDEMENT  Need plate and screws;  Surgeon: Homero Sutton MD;  Location: [facility] OR;  Service:    • UMBILICAL HERNIA REPAIR       History reviewed. No pertinent family history.  Social History     Tobacco Use   • Smoking status: Every Day     Packs/day: 0.25     Years: 40.00     Pack years: 10.00     Types: Cigarettes     Passive exposure: Current   • Smokeless tobacco: Never   Vaping Use   • Vaping Use: Never used   Substance Use Topics   • Alcohol use: No   • Drug use: No       ALLERGIES:  Allergies   Allergen Reactions   • Amiodarone Anaphylaxis   • Contrast Dye (Echo Or Unknown Ct/Mr) Anaphylaxis   • Iodine Anaphylaxis   • Multivitamins Anaphylaxis     Oyster calcium   • Pepcid [Famotidine] Shortness Of Breath     Pt has taken before with adverse reaction   • Proton Pump Inhibitors Shortness Of Breath   • Shellfish-Derived Products Anaphylaxis   • Dexamethasone Other (See Comments)     hiccups   • Livalo [Pitavastatin] Swelling   • Oyster Calcium Other (See Comments)     .   • Statins Swelling   • Tetracyclines & Related Other (See Comments)     \"skin falls off\"   • Penicillins Rash         MEDICATIONS:    Current Outpatient Medications:   •  albuterol sulfate  (90 Base) MCG/ACT inhaler, Inhale 2 puffs Every 4 (Four) Hours As Needed for Wheezing., Disp: , Rfl:   •  aspirin 81 MG chewable tablet, Chew 1 tablet Daily., Disp: 90 tablet, Rfl: 3  •  baclofen (LIORESAL) 10 MG tablet, Take 1 tablet by mouth 3 (Three) Times a Day., " "Disp: , Rfl:   •  Biotin 5000 MCG tablet, Take 5,000 mcg by mouth Every Night. 2 tablets, Disp: , Rfl:   •  CARTIA  MG 24 hr capsule, TAKE 1 CAPSULE BY MOUTH EVERY NIGHT, Disp: 90 capsule, Rfl: 0  •  cimetidine (Tagamet HB) 200 MG tablet, Take 2 tablets by mouth Every Night., Disp: 30 tablet, Rfl: 0  •  clopidogrel (PLAVIX) 75 MG tablet, Take 1 tablet by mouth Daily., Disp: 90 tablet, Rfl: 3  •  DULoxetine (CYMBALTA) 30 MG capsule, Take 1 capsule by mouth Every Night., Disp: , Rfl:   •  fluticasone-salmeterol (ADVAIR) 250-50 MCG/DOSE DISKUS, Inhale 2 (Two) Times a Day., Disp: , Rfl:   •  insulin NPH (humuLIN N,novoLIN N) 100 UNIT/ML injection, Inject 42 Units under the skin into the appropriate area as directed 2 (Two) Times a Day., Disp: , Rfl:   •  Insulin Syringe 29G X 1/2\" 1 ML misc, See Admin Instructions., Disp: , Rfl:   •  NOVOLIN R 100 UNIT/ML injection, Inject 22 Units as directed 2 (Two) Times a Day., Disp: , Rfl:   •  ranolazine (Ranexa) 500 MG 12 hr tablet, Take 1 tablet by mouth 2 (Two) Times a Day., Disp: 180 tablet, Rfl: 3  •  sennosides-docusate (senna-docusate sodium) 8.6-50 MG per tablet, Take 2 tablets by mouth 2 (Two) Times a Day As Needed for Constipation., Disp: , Rfl:   •  tadalafil (CIALIS) 5 MG tablet, Take 1 tablet by mouth Every Night., Disp: , Rfl:   •  tiotropium (SPIRIVA) 18 MCG per inhalation capsule, Place 1 capsule into inhaler and inhale As Needed., Disp: , Rfl:     Review of Systems   Review of Systems   Constitutional: Negative for malaise/fatigue and weight loss.   Cardiovascular: Positive for chest pain and claudication. Negative for dyspnea on exertion.   Respiratory: Negative for cough and shortness of breath.    Skin: Negative for color change and poor wound healing.   Musculoskeletal: Positive for back pain and muscle weakness.   Neurological: Negative for dizziness, numbness and weakness.       Physical Exam   Vitals:    03/01/23 1512   BP: 149/80   BP Location: Left " "arm   Pulse: 84   SpO2: 98%   Weight: 103 kg (226 lb)   Height: 170.2 cm (67\")     Body surface area is 2.13 meters squared.  Body mass index is 35.4 kg/m².  Physical Exam  Constitutional:       General: He is not in acute distress.     Appearance: He is not ill-appearing.   HENT:      Right Ear: Hearing normal.      Left Ear: Hearing normal.      Nose: No nasal deformity.      Mouth/Throat:      Dentition: Normal dentition. Does not have dentures.   Cardiovascular:      Rate and Rhythm: Normal rate and regular rhythm.      Pulses:           Radial pulses are 2+ on the right side and 2+ on the left side.        Dorsalis pedis pulses are 1+ on the right side and 2+ on the left side.        Posterior tibial pulses are 1+ on the right side and 1+ on the left side.      Heart sounds: No murmur heard.  Pulmonary:      Effort: Pulmonary effort is normal.      Breath sounds: Normal breath sounds.   Abdominal:      General: There is no distension.      Palpations: Abdomen is soft. There is no mass.      Tenderness: There is no abdominal tenderness.   Musculoskeletal:         General: No deformity.      Comments: Gait normal.    Skin:     General: Skin is warm and dry.      Coloration: Skin is not pale.      Findings: No erythema.      Comments: No venous staining   Neurological:      Mental Status: He is alert and oriented to person, place, and time.   Psychiatric:         Speech: Speech normal.         Behavior: Behavior is cooperative.         Thought Content: Thought content normal.         Judgment: Judgment normal.         BUN   Date Value Ref Range Status   03/22/2022 19 8 - 23 mg/dL Final     Creatinine   Date Value Ref Range Status   03/22/2022 1.37 (H) 0.76 - 1.27 mg/dL Final     eGFR Non  Amer   Date Value Ref Range Status   02/03/2021 59 (L) >60 mL/min/1.73 Final       ASSESSMENT/PLAN  8/2022 DAISHA: Right 0.82 triphasic.  Left 0.87 triphasic.   8/2022 Carotid Duplex: RENU 50-69% mPSV 166c/s Ratio 2.6, LICA " 0-49% mPSV 115c/s Ratio 1.5 , Antegrade vertebrals      1. Bilateral carotid artery stenosis  Worsening right internal carotid disease would recommend CTA carotid for further evaluation which she has declined at this time due to undergoing GI work-up.    Repeat duplex in 6 months    Sustained patency of L ICA stent.     He is currently off his Plavix secondary to GI issues would recommend that he restart as soon as possible, remains on aspirin, intolerant of statin and repatha.       2. PVD (peripheral vascular disease) (HCC)  Moderate reduction in right lower extremity perfusion, new onset lifestyle limiting claudication    Would consider arteriogram, has declined at this time secondary to GI issues undergoing extensive work-up he is welcome to contact us should he wish to have this scheduled sooner    The meantime we will survey with interval imaging repeat DAISHA in 6 months    - Doppler Ankle Brachial Index Single Level CAR; Future    3. Mixed hyperlipidemia  Lipid-lowering therapy has been proven beneficial in patients with cardio-vascular disease. Current guidelines recommend statin treatment for all patients with PAD,CAD and carotid stenosis. Statins are beneficial in preventing cardiovascular events, increasing functional capacity and lower the risk of adverse limb loss in PAD. Statins decrease the progression of plaque formation and may improve peripheral vessel lining, and aid in reversing atherosclerosis.    4. Smoker  Smoking cessation assistance options offered including behavioral counseling (Smoking Cessation Classes), Nicotine replacement therapy (patches or gum), pharmacologic therapy (Chantix, Wellbutrin). Understands tobacco increases risk of expanding AAA, MI, CVA, PAD, carcinoma. Discussion and question answer period 5-7 minutes.    5. Class 2 obesity due to excess calories with serious comorbidity and body mass index (BMI) of 34.0 to 34.9 in adult  Smoking cessation assistance options offered  including behavioral counseling (Smoking Cessation Classes), Nicotine replacement therapy (patches or gum), pharmacologic therapy (Chantix, Wellbutrin). Understands tobacco increases risk of expanding AAA, MI, CVA, PAD, carcinoma. Discussion and question answer period 5-7 minutes.                  This document has been electronically signed by MARCIO MuñozCNP-BC @  On March 2, 2023 16:06 CST

## 2023-05-11 ENCOUNTER — OFFICE VISIT (OUTPATIENT)
Dept: CARDIOLOGY | Facility: CLINIC | Age: 63
End: 2023-05-11
Payer: COMMERCIAL

## 2023-05-11 VITALS
OXYGEN SATURATION: 97 % | SYSTOLIC BLOOD PRESSURE: 128 MMHG | BODY MASS INDEX: 35.94 KG/M2 | HEIGHT: 67 IN | TEMPERATURE: 97.3 F | DIASTOLIC BLOOD PRESSURE: 76 MMHG | WEIGHT: 229 LBS

## 2023-05-11 DIAGNOSIS — I73.9 PVD (PERIPHERAL VASCULAR DISEASE): ICD-10-CM

## 2023-05-11 DIAGNOSIS — I10 ESSENTIAL HYPERTENSION: ICD-10-CM

## 2023-05-11 DIAGNOSIS — E78.2 MIXED HYPERLIPIDEMIA: ICD-10-CM

## 2023-05-11 DIAGNOSIS — F17.218 NICOTINE DEPENDENCE, CIGARETTES, WITH OTHER NICOTINE-INDUCED DISORDERS: ICD-10-CM

## 2023-05-11 DIAGNOSIS — I25.118 CORONARY ARTERY DISEASE OF NATIVE ARTERY OF NATIVE HEART WITH STABLE ANGINA PECTORIS: Primary | ICD-10-CM

## 2023-05-11 DIAGNOSIS — Z95.1 S/P CABG (CORONARY ARTERY BYPASS GRAFT): ICD-10-CM

## 2023-05-11 LAB
QT INTERVAL: 372 MS
QTC INTERVAL: 437 MS

## 2023-05-11 PROCEDURE — 99214 OFFICE O/P EST MOD 30 MIN: CPT | Performed by: INTERNAL MEDICINE

## 2023-05-11 PROCEDURE — 93000 ELECTROCARDIOGRAM COMPLETE: CPT | Performed by: INTERNAL MEDICINE

## 2023-05-11 RX ORDER — LOSARTAN POTASSIUM 50 MG/1
50 TABLET ORAL DAILY
COMMUNITY

## 2023-05-11 RX ORDER — ASPIRIN 325 MG
TABLET ORAL
COMMUNITY
End: 2023-05-11

## 2023-05-11 RX ORDER — AMLODIPINE BESYLATE 2.5 MG/1
1 TABLET ORAL EVERY 12 HOURS SCHEDULED
COMMUNITY
Start: 2023-05-08

## 2023-05-11 RX ORDER — CLOPIDOGREL BISULFATE 75 MG/1
TABLET ORAL
COMMUNITY
End: 2023-05-11

## 2023-05-11 NOTE — PROGRESS NOTES
Paddy Brantley  62 y.o. male    1. Coronary artery disease of native artery of native heart with stable angina pectoris    2. Mixed hyperlipidemia    3. Essential hypertension    4. S/P CABG (coronary artery bypass graft)    5. PVD (peripheral vascular disease)    6. Nicotine dependence, cigarettes, with other nicotine-induced disorders        History of Present Illness  Mr. Brantley is a 62-year-old male with multiple medical issues including coronary artery disease status post CABG, multiple PCI prior to CABG, GERD, diabetes mellitus, peripheral vascular disease, chronic pain syndrome, nicotine dependence, BPH, multiple drug allergies.    The patient underwent coronary artery bypass surgery in November 2016 when he received LIMA to LAD, SVG to OM, SVG to diagonal and SVG to PDA.  He developed multiple issues postoperatively including left hemothorax which had to be drained.  In October 2017 he had a fracture/nonunion mid sternal body and underwent sternal revision and repair with sternal plates in December 2017.    He was admitted for evaluation of chest discomfort in October 2020 and cardiac enzymes were negative for myocardial injury.  He underwent the following tests:  Lexiscan Cardiolite stress test in October 2020 showed:  · Findings consistent with an equivocal ECG stress test.  · Left ventricular ejection fraction is normal. (Calculated EF = 65%).  · Myocardial perfusion imaging indicates a normal myocardial perfusion study with no evidence of ischemia.  · Impressions are consistent with a low risk study.    He follows up with vascular surgery on a regular basis:  4/2022 L TCAR  3/2023 Carotid Duplex: RENU >70% mPSV 267c/s mEDV 40c/s Ratio 1.8, LICA 50-69% mPSV 156c/s mEDV 31c/s Ratio 0.8, Antegrade vertebrals   3/2021 Aortagram: Bilateral PEGGY stent (kissing), R SFA PTA  3/2023 DAISHA: Right 0.57 triphasic popliteal biphasic distally.  Left 0.83 triphasic biphasic     The patient did report intermittent  "epigastric/retrosternal discomfort which he does not believe is related to his heart.  He has had esophagitis and has responded to H2 antagonists.    EKG today showed sinus rhythm with heart rate of 83 bpm.  Old inferior wall myocardial infarction.       Allergies   Allergen Reactions   • Amiodarone Anaphylaxis and Other (See Comments)     contains iodine     • Contrast Dye (Echo Or Unknown Ct/Mr) Anaphylaxis   • Iodine Anaphylaxis   • Lansoprazole Shortness Of Breath   • Multivitamins Anaphylaxis     Oyster calcium   • Pepcid [Famotidine] Shortness Of Breath     Pt has taken before with adverse reaction   • Proton Pump Inhibitors Shortness Of Breath   • Shellfish-Derived Products Anaphylaxis   • Clopidogrel Unknown - High Severity and Unknown - Low Severity   • Dexamethasone Other (See Comments)     hiccups   • Livalo [Pitavastatin] Swelling   • Oyster Calcium Other (See Comments)     .   • Statins Swelling   • Tetracyclines & Related Other (See Comments)     \"skin falls off\"   • Penicillins Rash   • Tetracycline Rash         Past Medical History:   Diagnosis Date   • Accident caused by hypodermic needle    • Guerra esophagus    • Contact with and (suspected) exposure to potentially hazardous body fluids    • Diabetes mellitus    • GERD (gastroesophageal reflux disease)    • Kidney stones    • Meniere's disease    • MRSA infection     right ing hernia   • PAD (peripheral artery disease)          Past Surgical History:   Procedure Laterality Date   • APPENDECTOMY     • ARTERIOGRAM N/A 4/1/2021    Procedure: lower extremity Arteriogram possible angioplasty stent atherectomy thrombolysis;  Surgeon: Homero Sutton MD;  Location: [facility];  Service: Interventional Radiology;  Laterality: N/A;   • CARDIAC CATHETERIZATION     • CARDIAC SURGERY  11/22/2016    CABG   • CHEST TUBE INSERTION     • CORONARY ARTERY BYPASS GRAFT     • GALLBLADDER SURGERY     • INCISION AND DRAINAGE ABSCESS     • " "INGUINAL HERNIA REPAIR Bilateral    • LUMBAR LAMINECTOMY     • STERNAL REWIRING N/A 12/19/2017    Procedure: REPAIR STERNAL DEHISCENCE WITH STERNAL DEBRIDEMENT  Need plate and screws;  Surgeon: Homero Sutton MD;  Location: [facility];  Service:    • UMBILICAL HERNIA REPAIR           History reviewed. No pertinent family history.      Social History     Socioeconomic History   • Marital status: Single   Tobacco Use   • Smoking status: Every Day     Packs/day: 0.25     Years: 40.00     Pack years: 10.00     Types: Cigarettes     Passive exposure: Current   • Smokeless tobacco: Never   Vaping Use   • Vaping Use: Never used   Substance and Sexual Activity   • Alcohol use: No   • Drug use: No   • Sexual activity: Defer         Current Outpatient Medications   Medication Sig Dispense Refill   • albuterol sulfate  (90 Base) MCG/ACT inhaler Inhale 2 puffs Every 4 (Four) Hours As Needed for Wheezing.     • amLODIPine (NORVASC) 2.5 MG tablet Take 1 tablet by mouth Every 12 (Twelve) Hours.     • aspirin 81 MG chewable tablet Chew 1 tablet Daily. 90 tablet 3   • baclofen (LIORESAL) 10 MG tablet Take 1 tablet by mouth 3 (Three) Times a Day.     • Biotin 5000 MCG tablet Take 5,000 mcg by mouth Every Night. 2 tablets     • CARTIA  MG 24 hr capsule TAKE 1 CAPSULE BY MOUTH EVERY NIGHT 90 capsule 0   • cimetidine (Tagamet HB) 200 MG tablet Take 2 tablets by mouth Every Night. 30 tablet 0   • DULoxetine (CYMBALTA) 30 MG capsule Take 1 capsule by mouth Every Night.     • fluticasone-salmeterol (ADVAIR) 250-50 MCG/DOSE DISKUS Inhale 2 (Two) Times a Day.     • insulin NPH (humuLIN N,novoLIN N) 100 UNIT/ML injection Inject 42 Units under the skin into the appropriate area as directed 2 (Two) Times a Day.     • Insulin Syringe 29G X 1/2\" 1 ML misc See Admin Instructions.     • losartan (COZAAR) 50 MG tablet Take 1 tablet by mouth Daily.     • NOVOLIN R 100 UNIT/ML injection Inject 22 Units as directed 2 (Two) Times a " "Day.     • ranolazine (Ranexa) 500 MG 12 hr tablet Take 1 tablet by mouth 2 (Two) Times a Day. 180 tablet 3   • tadalafil (CIALIS) 5 MG tablet Take 1 tablet by mouth Every Night.     • tiotropium (SPIRIVA) 18 MCG per inhalation capsule Place 1 capsule into inhaler and inhale As Needed.       No current facility-administered medications for this visit.         OBJECTIVE    /76 (BP Location: Left arm, Patient Position: Sitting, Cuff Size: Adult)   Temp 97.3 °F (36.3 °C)   Ht 170.2 cm (67\")   Wt 104 kg (229 lb)   SpO2 97%   BMI 35.87 kg/m²         Review of Systems: The following systems are reviewed and changes noted as indicated in the history of present illness    Constitutional:  Denies recent weight loss, weight gain, fever or chills     HENT:  Denies any hearing loss, epistaxis, hoarseness, or difficulty speaking.     Eyes: Wears eyeglasses or contact lenses     Respiratory:  Denies dyspnea with exertion,no cough, wheezing, or hemoptysis.     Cardiovascular: Intermittent chest pressure/epigastric pain.  No palpitation    Gastrointestinal:  Denies change in bowel habits, dyspepsia, ulcer disease, hematochezia, or melena.     Endocrine: Negative for cold intolerance, heat intolerance, polydipsia, polyphagia and polyuria.     Genitourinary: Negative.      Musculoskeletal: Denies any history of arthritic symptoms or back problems     Neurological:  Denies any history of recurrent headaches, strokes, TIA, or seizure disorder.       Physical Exam: The following systems are reviewed and no changes noted     Constitutional: Cooperative, alert and oriented, in no acute distress.      HENT:   Head: Normocephalic, normal hair patterns, no masses or tenderness.  Ears, Nose, and Throat: No gross abnormalities. No pallor or cyanosis. Dentition good.   Eyes: EOMS intact, PERRL, conjunctivae and lids unremarkable. Fundoscopic exam and visual fields not performed.   Neck: No palpable masses or adenopathy, no " thyromegaly, no JVD, carotid pulses are full and equal bilaterally and without bruit.      Cardiovascular: Regular rhythm, S1 and S2 normal, no S3 or S4.  No murmurs, gallops, or rubs detected.      Pulmonary/Chest: Chest: normal symmetry, Sternal  tenderness to palpation, normal respiratory excursion, no intercostal retraction, no use of accessory muscles. Pulmonary: Normal breath sounds - no rales or rhonchi.     Abdominal: Abdomen soft, bowel sounds normoactive, no masses, no hepatosplenomegaly, non-tender, no bruits.     Musculoskeletal: No deformities, clubbing, cyanosis, erythema, or edema observed.     Neurological: No gross motor or sensory deficits noted, Cranial nerves 2-12 normal. affect appropriate, oriented to time, person, place.      Skin: Warm and dry to the touch, no apparent skin lesions or masses noted.     Psychiatric: Normal mood and affect. Behavior is normal. Judgment and thought content normal.   Procedures      Lab Results   Component Value Date    WBC 7.81 04/14/2022    HGB 16.6 04/14/2022    HCT 47.1 04/14/2022    MCV 86.7 04/14/2022     04/14/2022     Lab Results   Component Value Date    GLUCOSE 251 (H) 03/22/2022    BUN 19 03/22/2022    CREATININE 1.37 (H) 03/22/2022    EGFRIFNONA 59 (L) 02/03/2021    BCR 13.9 03/22/2022    CO2 21.0 (L) 03/22/2022    CALCIUM 9.8 03/22/2022    ALBUMIN 4.30 09/30/2020    AST 12 09/30/2020    ALT 11 09/30/2020     Lab Results   Component Value Date    CHOL 211 (H) 09/30/2020    CHOL 213 (H) 09/30/2020    CHOL 224 (H) 06/07/2019     Lab Results   Component Value Date    TRIG 172 (H) 09/30/2020    TRIG 130 09/30/2020    TRIG 120 06/07/2019     Lab Results   Component Value Date    HDL 44 09/30/2020    HDL 43 09/30/2020    HDL 48 06/07/2019     No components found for: LDLCALC  Lab Results   Component Value Date     (H) 09/30/2020     (H) 09/30/2020     (H) 06/07/2019     No results found for: HDLLDLRATIO  No components found for:  CHOLHDL  Lab Results   Component Value Date    HGBA1C 5.60 09/30/2020     Lab Results   Component Value Date    TSH 0.21 (L) 11/18/2016    C2WXVDM 96 (L) 11/18/2016    V5YIBES 6.3 11/18/2016           ASSESSMENT AND PLAN  Paddy Brantley has multiple medical issues as discussed in detail under history of present illness.  He is clinically compensated with no definite evidence of angina, arrhythmia or congestive heart failure.  Keeping up with appointments as scheduled has been an issue in the past.  He has been compliant with his medications but does not wish to take statins or PCSK9 inhibitors.  His LDL is known to be elevated.  He follows up with vascular surgery on a regular basis.    I have continued antiplatelet therapy with aspirin, antihypertensive therapy with Cartia XT, losartan and amlodipine.  Ranexa 500 mg twice a day has been continued.  An echocardiogram to assess left ventricular and valvular function has been arranged.    Tobacco cessation was stressed but he does not wish to quit.    Diagnoses and all orders for this visit:    1. Coronary artery disease of native artery of native heart with stable angina pectoris (Primary)  -     ECG 12 Lead  -     Adult Transthoracic Echo Complete w/ Color, Spectral and Contrast if Necessary Per Protocol; Future    2. Mixed hyperlipidemia  -     Adult Transthoracic Echo Complete w/ Color, Spectral and Contrast if Necessary Per Protocol; Future    3. Essential hypertension  -     Adult Transthoracic Echo Complete w/ Color, Spectral and Contrast if Necessary Per Protocol; Future    4. S/P CABG (coronary artery bypass graft)  -     Adult Transthoracic Echo Complete w/ Color, Spectral and Contrast if Necessary Per Protocol; Future    5. PVD (peripheral vascular disease)  -     Adult Transthoracic Echo Complete w/ Color, Spectral and Contrast if Necessary Per Protocol; Future    6. Nicotine dependence, cigarettes, with other nicotine-induced disorders  -     Adult  Transthoracic Echo Complete w/ Color, Spectral and Contrast if Necessary Per Protocol; Future        Patient's Body mass index is 35.87 kg/m². BMI is above normal parameters. Recommendations include: exercise counseling and nutrition counseling.      Paddy Brantley  reports that he has been smoking cigarettes. He has a 10.00 pack-year smoking history. He has been exposed to tobacco smoke. He has never used smokeless tobacco.. I have educated him on the risk of diseases from using tobacco products such as cancer, COPD and heart disease.     I advised him to quit and he is not willing to quit.    I spent 3  minutes counseling the patient.       Gordo Resendez MD  5/11/2023  12:10 CDT

## 2023-08-14 NOTE — PROGRESS NOTES
7/8/2019    Paddy Brantley  1960    Chief Complaint:    Chief Complaint   Patient presents with   • Coronary Artery Disease       HPI:      PCP:  Nick Modi MD   Cardiology: Dr. Resendez    58 y.o. male with HTN(uncontrolled, increased risk stroke, rupture), Diabetes Mellitus(stable, increased risk cardiovascular events), Obesity(uncontrolled, increased risk cardiovascular events), Smoker(uncontrolled, increased risk cardiovascular events), COPD(stable, increased risk pulmonary complications) and Chronic Kidney Disease(stable, increased risk renal failure) CAD(stable, increase risk cardiovascular events).  smokes 1 PPD.  developed sternal instability after fall following CABG 2016 underwent sternal revision with plating in December 2017. fall in which his sternum made first contact with the ground followed by his head, chest x-ray revealed fracture of sternal plating at the base of the sternum, he has significant pain, reports inability to perform job duties effectively due to his pain.  Off 6 weeks from work, feels unable to return due to pain intolerance. Pain has since improved, feels lower sternal area pop/click.  No other associated signs, symptoms or modifying factors.    11/2016: Carotid duplex: BICA <50%     11/2016 echocardiogram: EF 50-55%, mild MAC, trace MR, diastolic dysfunction  11/2016 Cardiac Catheterization: 70% LAD, 70% circumflex, 80% RCA, 70% diagonal  11/2016 CABG x4 LIMA-LAD, SVG-OM, SVG-DX, SVG-PDA, EVH  12/2017 Sternal revision and Repair with Sternal Plates.   3/2019 CXR:  Broken 4th place distal sternum  7/2019 CT Chest:  First 3 sternal plates intact.  Lower sternal separation with broken plate.  No change or migration of hardware.    The following portions of the patient's history were reviewed and updated as appropriate: allergies, current medications, past family history, past medical history, past social history, past surgical history and problem list.  Recent images  "independently reviewed.  Available laboratory values reviewed.    PMH:  Past Medical History:   Diagnosis Date   • Accident caused by hypodermic needle    • Guerra esophagus    • Contact with and (suspected) exposure to potentially hazardous body fluids    • Diabetes mellitus (CMS/HCC)    • GERD (gastroesophageal reflux disease)    • Kidney stones    • Meniere's disease    • MRSA infection     right ing hernia   • PAD (peripheral artery disease) (CMS/HCC)      Past Surgical History:   Procedure Laterality Date   • APPENDECTOMY     • CARDIAC CATHETERIZATION     • CARDIAC SURGERY  2016    CABG   • CHEST TUBE INSERTION     • CORONARY ARTERY BYPASS GRAFT     • GALLBLADDER SURGERY     • INCISION AND DRAINAGE ABSCESS     • INGUINAL HERNIA REPAIR Bilateral    • LUMBAR LAMINECTOMY     • STERNAL REWIRING N/A 2017    Procedure: REPAIR STERNAL DEHISCENCE WITH STERNAL DEBRIDEMENT  Need plate and screws;  Surgeon: Homero Sutton MD;  Location: [facility];  Service:    • UMBILICAL HERNIA REPAIR       History reviewed. No pertinent family history.  Social History     Tobacco Use   • Smoking status: Current Every Day Smoker     Packs/day: 0.50     Years: 40.00     Pack years: 20.00     Types: Cigarettes     Last attempt to quit: 1/3/2017     Years since quittin.5   • Smokeless tobacco: Never Used   Substance Use Topics   • Alcohol use: No   • Drug use: No       ALLERGIES:  Allergies   Allergen Reactions   • Amiodarone Anaphylaxis   • Contrast Dye Anaphylaxis   • Iodine Anaphylaxis   • Multivitamins Anaphylaxis     Oyster calcium   • Proton Pump Inhibitors Shortness Of Breath   • Shellfish-Derived Products Anaphylaxis   • Dexamethasone Other (See Comments)     hiccups   • Livalo [Pitavastatin] Swelling   • Plavix [Clopidogrel Bisulfate] Other (See Comments)     Excessive bleeding   • Statins Swelling   • Tetracyclines & Related Other (See Comments)     \"skin falls off\"   • Penicillins Rash "         MEDICATIONS:    Current Outpatient Medications:   •  acetaminophen (TYLENOL) 325 MG tablet, Take 2 tablets by mouth Every 6 (Six) Hours As Needed for Mild Pain ., Disp: , Rfl:   •  ascorbic acid (VITAMIN C) 1000 MG tablet, Take 1,000 mg by mouth 2 (Two) Times a Day., Disp: , Rfl:   •  aspirin 325 MG tablet, Take 325 mg by mouth Every 12 (Twelve) Hours., Disp: , Rfl:   •  Biotin 5000 MCG tablet, Take 5,000 mcg by mouth Daily. 2 tablets, Disp: , Rfl:   •  CARTIA  MG 24 hr capsule, TAKE 1 CAPSULE BY MOUTH EVERY NIGHT, Disp: 90 capsule, Rfl: 0  •  Coenzyme Q10 (COQ10) 100 MG capsule, Take 1 tablet by mouth Daily., Disp: , Rfl:   •  diphenhydrAMINE (BENADRYL) 25 mg capsule, Take 25 mg by mouth Every 6 (Six) Hours As Needed for Itching., Disp: , Rfl:   •  insulin NPH (HUMULIN N) 100 UNIT/ML injection, Inject 42 Units under the skin 2 (Two) Times a Day., Disp: , Rfl:   •  NOVOLIN R 100 UNIT/ML injection, Inject 22 Units as directed 2 (Two) Times a Day., Disp: , Rfl:   •  oxyCODONE-acetaminophen (PERCOCET)  MG per tablet, Take 1 tablet by mouth Every 6 (Six) Hours As Needed for Moderate Pain ., Disp: 15 tablet, Rfl: 0  •  raNITIdine (ZANTAC) 300 MG tablet, Take 300 mg by mouth 2 (Two) Times a Day., Disp: , Rfl:   •  losartan (COZAAR) 50 MG tablet, Take 1 tablet by mouth Daily., Disp: 30 tablet, Rfl: 6    Review of Systems   Review of Systems   Constitution: Negative for weakness, malaise/fatigue and weight loss.   Cardiovascular: Positive for chest pain. Negative for claudication and dyspnea on exertion.   Respiratory: Negative for cough and shortness of breath.    Skin: Negative for color change and poor wound healing.   Musculoskeletal: Positive for arthritis, back pain and joint pain.   Neurological: Negative for dizziness and numbness.       Physical Exam   Vitals:    07/08/19 0909   BP: 155/75   BP Location: Left arm   Pulse: 100   Temp: 97.9 °F (36.6 °C)   TempSrc: Temporal   SpO2: 98%   Weight:  "96.6 kg (213 lb)   Height: 172.7 cm (68\")     Physical Exam   Constitutional: He is oriented to person, place, and time. He is active and cooperative. He does not appear ill. No distress.   HENT:   Right Ear: Hearing normal.   Left Ear: Hearing normal.   Nose: No nasal deformity. No epistaxis.   Mouth/Throat: He does not have dentures. Normal dentition.   Cardiovascular: Normal rate and regular rhythm.   No murmur heard.  Pulses:       Carotid pulses are 2+ on the right side, and 2+ on the left side.       Radial pulses are 2+ on the right side, and 2+ on the left side.        Dorsalis pedis pulses are 2+ on the right side, and 2+ on the left side.        Posterior tibial pulses are 2+ on the right side, and 2+ on the left side.   Lower sternum has tenderness, movement with cough, palpation.     Upper sternum stable.   Pulmonary/Chest: Effort normal and breath sounds normal.   Abdominal: Soft. He exhibits no distension and no mass. There is no tenderness.   Musculoskeletal: He exhibits no deformity.   Gait normal.    Neurological: He is alert and oriented to person, place, and time. He has normal strength.   Skin: Skin is warm and dry. No cyanosis or erythema. No pallor.   No venous staining   Psychiatric: He has a normal mood and affect. His speech is normal. Judgment and thought content normal.     Results for PALOMO SPEARS (MRN [id number]) as of 7/8/2019 11:03  GFR 64 Ref. Range 6/8/2019 05:17   Creatinine Latest Ref Range: 0.76 - 1.27 mg/dL 1.16   BUN Latest Ref Range: 8 - 23 mg/dL 17     ASSESSMENT:  Palomo was seen today for coronary artery disease.    Diagnoses and all orders for this visit:    Closed fracture of body of sternum, initial encounter    Essential hypertension    Coronary artery disease involving native coronary artery of native heart with unstable angina pectoris (CMS/[facility])    PAD (peripheral artery disease) (CMS/[facility])    Type 2 diabetes mellitus with complication, with long-term current use " of insulin (CMS/[facility])    Chronic kidney disease, stage 2 (mild)    Mixed hyperlipidemia    PLAN:  Detailed discussion with Paddy Brantley regarding situation and options.  Stable sternal fracture non-union broken hardware following fall. Upper 3 plates intact.  Multiple risk factors with severe comorbidities. Options include reoperation with removal of broken hardware, possible replating lower sternum although this may not be possible. He does not want any further surgery at this time. No intervention indicated at this time.  Will follow with interval imaging.  Risks, benefits discussed.  Understands and wishes to proceed with plan.    Continue restrictions no lifting >10lb, pushing, pulling, repetitive motion or strain involving anterior chest wall.    Return as needed.  Smoking cessation advised and assistance options offered including behavioral counseling ([facility] Cessation [facility]), Nicotine replacement therapy (patches or gum), pharmacologic therapy (Chantix, Wellbutrin). patient understands that continued use of tobacco products increases risk of heart disease, stroke, cancer; counseling for 3-5min.  Obesity Class  1. Increased risk cardiovascular events, sleep and breathing disorders, joint issues, type 2 diabetes mellitus. Options for weight management, heart healthy diet, exercise programs, and associated health risks of obesity discussed.    Recommended regular physical activity, progressive walking program.  Continue current medications as directed.    Thank you for the opportunity to participate in this patient's care.    Copy to primary care provider.    EMR Dragon/Transcription disclaimer:   Much of this encounter note is an electronic transcription/translation of spoken language to printed text. The electronic translation of spoken language may permit erroneous, or at times, nonsensical words or phrases to be inadvertently transcribed; Although I have reviewed the note for such  errors, some may still exist.       Tetracycline Counseling: Patient counseled regarding possible photosensitivity and increased risk for sunburn.  Patient instructed to avoid sunlight, if possible.  When exposed to sunlight, patients should wear protective clothing, sunglasses, and sunscreen.  The patient was instructed to call the office immediately if the following severe adverse effects occur:  hearing changes, easy bruising/bleeding, severe headache, or vision changes.  The patient verbalized understanding of the proper use and possible adverse effects of tetracycline.  All of the patient's questions and concerns were addressed. Patient understands to avoid pregnancy while on therapy due to potential birth defects.
